# Patient Record
Sex: FEMALE | Race: BLACK OR AFRICAN AMERICAN | NOT HISPANIC OR LATINO | Employment: FULL TIME | URBAN - METROPOLITAN AREA
[De-identification: names, ages, dates, MRNs, and addresses within clinical notes are randomized per-mention and may not be internally consistent; named-entity substitution may affect disease eponyms.]

---

## 2019-05-13 ENCOUNTER — OFFICE VISIT (OUTPATIENT)
Dept: URGENT CARE | Facility: CLINIC | Age: 29
End: 2019-05-13
Payer: COMMERCIAL

## 2019-05-13 VITALS
TEMPERATURE: 98 F | OXYGEN SATURATION: 97 % | RESPIRATION RATE: 19 BRPM | HEART RATE: 96 BPM | HEIGHT: 62 IN | BODY MASS INDEX: 42.33 KG/M2 | WEIGHT: 230 LBS | SYSTOLIC BLOOD PRESSURE: 114 MMHG | DIASTOLIC BLOOD PRESSURE: 79 MMHG

## 2019-05-13 DIAGNOSIS — J03.90 EXUDATIVE TONSILLITIS: Primary | ICD-10-CM

## 2019-05-13 PROCEDURE — 99214 PR OFFICE/OUTPT VISIT, EST, LEVL IV, 30-39 MIN: ICD-10-PCS | Mod: S$GLB,,, | Performed by: INTERNAL MEDICINE

## 2019-05-13 PROCEDURE — 99214 OFFICE O/P EST MOD 30 MIN: CPT | Mod: S$GLB,,, | Performed by: INTERNAL MEDICINE

## 2019-05-13 RX ORDER — AMOXICILLIN AND CLAVULANATE POTASSIUM 875; 125 MG/1; MG/1
1 TABLET, FILM COATED ORAL EVERY 12 HOURS
Qty: 20 TABLET | Refills: 0 | Status: SHIPPED | OUTPATIENT
Start: 2019-05-13 | End: 2019-05-23

## 2019-05-13 RX ORDER — LIDOCAINE HYDROCHLORIDE 20 MG/ML
SOLUTION OROPHARYNGEAL
Qty: 100 ML | Refills: 0 | Status: SHIPPED | OUTPATIENT
Start: 2019-05-13 | End: 2019-06-14

## 2019-05-13 NOTE — PROGRESS NOTES
"Subjective:       Patient ID: Jose Hernandez is a 28 y.o. female.    Vitals:  height is 5' 2" (1.575 m) and weight is 104.3 kg (230 lb). Her oral temperature is 98.4 °F (36.9 °C). Her blood pressure is 114/79 and her pulse is 96. Her respiration is 19 and oxygen saturation is 97%.     Chief Complaint: Sore Throat    Sore Throat    This is a new problem. The current episode started in the past 7 days (a week). The problem has been gradually improving. The pain is worse on the left side. There has been no fever. The pain is at a severity of 7/10. The pain is moderate. Associated symptoms include trouble swallowing. Pertinent negatives include no abdominal pain, congestion, coughing, diarrhea, drooling, ear discharge, ear pain, headaches, hoarse voice, plugged ear sensation, neck pain, shortness of breath, stridor, swollen glands or vomiting. She has had no exposure to strep or mono. Treatments tried: steroid shot (saturday) The treatment provided mild relief.       Constitution: Negative for chills, sweating, fatigue and fever.   HENT: Positive for postnasal drip, sore throat and trouble swallowing. Negative for ear pain, ear discharge, drooling, congestion, sinus pain, sinus pressure and voice change.    Neck: Negative for neck pain and painful lymph nodes.   Eyes: Negative for eye redness.   Respiratory: Negative for chest tightness, cough, sputum production, bloody sputum, COPD, shortness of breath, stridor, wheezing and asthma.    Gastrointestinal: Negative for abdominal pain, nausea, vomiting and diarrhea.   Musculoskeletal: Negative for muscle ache.   Skin: Negative for rash.   Allergic/Immunologic: Negative for seasonal allergies and asthma.   Neurological: Negative for headaches.   Hematologic/Lymphatic: Negative for swollen lymph nodes.       Objective:      Physical Exam   Constitutional: She appears well-developed and well-nourished.   HENT:   Head: Normocephalic and atraumatic.   Mouth/Throat: " Oropharyngeal exudate, posterior oropharyngeal edema and posterior oropharyngeal erythema present. Tonsils are 0 on the right. Tonsils are 2+ on the left. Tonsillar exudate.   Eyes: Pupils are equal, round, and reactive to light. Conjunctivae and EOM are normal.   Lymphadenopathy:     She has cervical adenopathy.   Nursing note and vitals reviewed.      Assessment:       1. Exudative tonsillitis        Plan:         Exudative tonsillitis  -     amoxicillin-clavulanate 875-125mg (AUGMENTIN) 875-125 mg per tablet; Take 1 tablet by mouth every 12 (twelve) hours. for 10 days  Dispense: 20 tablet; Refill: 0  -     lidocaine HCl 2% (LIDOCAINE VISCOUS) 2 % Soln; by Mucous Membrane route every 3 (three) hours. Use 1 tsp 4qhours prn throat pain  Dispense: 100 mL; Refill: 0

## 2019-05-29 ENCOUNTER — HOSPITAL ENCOUNTER (EMERGENCY)
Facility: HOSPITAL | Age: 29
Discharge: HOME | End: 2019-05-30
Attending: EMERGENCY MEDICINE
Payer: COMMERCIAL

## 2019-05-29 DIAGNOSIS — B37.31 VULVOVAGINITIS DUE TO YEAST: ICD-10-CM

## 2019-05-29 DIAGNOSIS — R05.9 COUGH: ICD-10-CM

## 2019-05-29 DIAGNOSIS — R19.7 DIARRHEA, UNSPECIFIED TYPE: Primary | ICD-10-CM

## 2019-05-29 DIAGNOSIS — J40 BRONCHITIS: ICD-10-CM

## 2019-05-29 LAB
AMPHET+METHAMPHET UR QL: NEGATIVE
B-HCG UR QL: NEGATIVE
BARBITURATES UR QL SCN>200 NG/ML: NEGATIVE
BENZODIAZ UR QL SCN>200 NG/ML: NEGATIVE
BILIRUB UR QL STRIP: NEGATIVE
BZE UR QL SCN: NEGATIVE
CANNABINOIDS UR QL SCN: NEGATIVE
CLARITY UR: CLEAR
COLOR UR: YELLOW
CREAT UR-MCNC: 37.4 MG/DL (ref 15–325)
CTP QC/QA: YES
GLUCOSE UR QL STRIP: NEGATIVE
HGB UR QL STRIP: NEGATIVE
KETONES UR QL STRIP: NEGATIVE
LEUKOCYTE ESTERASE UR QL STRIP: NEGATIVE
METHADONE UR QL SCN>300 NG/ML: NEGATIVE
NITRITE UR QL STRIP: NEGATIVE
OPIATES UR QL SCN: NEGATIVE
PCP UR QL SCN>25 NG/ML: NORMAL
PH UR STRIP: 6 [PH] (ref 5–8)
PROT UR QL STRIP: NEGATIVE
SP GR UR STRIP: <=1.005 (ref 1–1.03)
TOXICOLOGY INFORMATION: NORMAL
URN SPEC COLLECT METH UR: ABNORMAL
UROBILINOGEN UR STRIP-ACNC: NEGATIVE EU/DL

## 2019-05-29 PROCEDURE — 99284 EMERGENCY DEPT VISIT MOD MDM: CPT | Mod: 25

## 2019-05-29 PROCEDURE — 25000242 PHARM REV CODE 250 ALT 637 W/ HCPCS: Performed by: PHYSICIAN ASSISTANT

## 2019-05-29 PROCEDURE — 96360 HYDRATION IV INFUSION INIT: CPT

## 2019-05-29 PROCEDURE — 81025 URINE PREGNANCY TEST: CPT | Performed by: EMERGENCY MEDICINE

## 2019-05-29 PROCEDURE — 80307 DRUG TEST PRSMV CHEM ANLYZR: CPT

## 2019-05-29 PROCEDURE — 25000003 PHARM REV CODE 250: Performed by: PHYSICIAN ASSISTANT

## 2019-05-29 PROCEDURE — 94640 AIRWAY INHALATION TREATMENT: CPT

## 2019-05-29 PROCEDURE — 81003 URINALYSIS AUTO W/O SCOPE: CPT | Mod: 59

## 2019-05-29 RX ORDER — IPRATROPIUM BROMIDE AND ALBUTEROL SULFATE 2.5; .5 MG/3ML; MG/3ML
3 SOLUTION RESPIRATORY (INHALATION)
Status: COMPLETED | OUTPATIENT
Start: 2019-05-29 | End: 2019-05-29

## 2019-05-29 RX ORDER — GUAIFENESIN/DEXTROMETHORPHAN 100-10MG/5
10 SYRUP ORAL
Status: COMPLETED | OUTPATIENT
Start: 2019-05-29 | End: 2019-05-29

## 2019-05-29 RX ORDER — IPRATROPIUM BROMIDE AND ALBUTEROL SULFATE 2.5; .5 MG/3ML; MG/3ML
3 SOLUTION RESPIRATORY (INHALATION)
Status: DISCONTINUED | OUTPATIENT
Start: 2019-05-29 | End: 2019-05-29

## 2019-05-29 RX ADMIN — GUAIFENESIN AND DEXTROMETHORPHAN 10 ML: 100; 10 SYRUP ORAL at 11:05

## 2019-05-29 RX ADMIN — SODIUM CHLORIDE 1000 ML: 0.9 INJECTION, SOLUTION INTRAVENOUS at 11:05

## 2019-05-29 RX ADMIN — IPRATROPIUM BROMIDE AND ALBUTEROL SULFATE 3 ML: .5; 3 SOLUTION RESPIRATORY (INHALATION) at 11:05

## 2019-05-30 VITALS
WEIGHT: 230 LBS | SYSTOLIC BLOOD PRESSURE: 147 MMHG | HEIGHT: 62 IN | RESPIRATION RATE: 18 BRPM | BODY MASS INDEX: 42.33 KG/M2 | HEART RATE: 83 BPM | DIASTOLIC BLOOD PRESSURE: 98 MMHG | TEMPERATURE: 98 F | OXYGEN SATURATION: 98 %

## 2019-05-30 PROCEDURE — 25000003 PHARM REV CODE 250: Performed by: PHYSICIAN ASSISTANT

## 2019-05-30 RX ORDER — ALBUTEROL SULFATE 2.5 MG/.5ML
2.5 SOLUTION RESPIRATORY (INHALATION) EVERY 6 HOURS PRN
Qty: 20 EACH | Refills: 0 | Status: SHIPPED | OUTPATIENT
Start: 2019-05-30 | End: 2019-06-14

## 2019-05-30 RX ORDER — BENZONATATE 100 MG/1
100 CAPSULE ORAL 3 TIMES DAILY PRN
Qty: 20 CAPSULE | Refills: 0 | Status: SHIPPED | OUTPATIENT
Start: 2019-05-30 | End: 2019-06-09

## 2019-05-30 RX ORDER — FLUCONAZOLE 200 MG/1
200 TABLET ORAL
Status: COMPLETED | OUTPATIENT
Start: 2019-05-30 | End: 2019-05-30

## 2019-05-30 RX ADMIN — FLUCONAZOLE 200 MG: 100 TABLET ORAL at 12:05

## 2019-05-30 NOTE — ED TRIAGE NOTES
"Pt presents to ED with complaints of cough x 3 weeks, nausea & vomiting x,1 week accompanied with "excessive coughing", sore throat, diarrhea x 3 days about 3-4 times a day; painful urination and "soreness and swelling in vaginal area". Pt has taken imodium with no relief. Pt states she has been on antibiotics for the past couple of moths due to UTI and for sore throat/ "swollen adenoids". Pt denies vaginal discharge. Pt has been using cough drops, but no cough medicine.   "

## 2019-05-30 NOTE — DISCHARGE INSTRUCTIONS
If tessalon perles do not control cough, take Delsym. Use nebulizer treatments twice a day or every 6 hours as needed. Hydrate well with water and pedialyte. Begin taking probiotics. Follow up with PCP as soon as possible. Return to the ER with any new or worsening symptoms including abdominal pain or fever.

## 2019-05-30 NOTE — ED PROVIDER NOTES
Encounter Date: 5/29/2019       History     Chief Complaint   Patient presents with    Vomiting     Pt. c/o cough with vomiting and diarrhea. Pt. reports cough for 2 months and has been on antibiotics on and off for an URI and for a tooth removal. Reports dysuria, denies vaginal complaints.     29 yo female with no PMH presents to the ED with complaints of cough x 3 weeks with post tussive vomiting. Glenn complains of diarrhea x 3 days and vaginal irritation and itching. Patient states that she had a cold about 1 month ago and the cough has persisted since that time. She stopped taking cough medication after the cold resolved and has only been using throat lozenges. She states she has cough spells so intense she vomits. She states that diarrhea began about 3 days ago, occurs 3-4 times daily, and is watery with some solid stool in consistency. Patient has been on and off antibiotics for about 1 month for varies reasons including pre and post dental procedure and exudative tonsillitis. She also began having vaginal itching and irritation about 3 days ago. She admits to increased water intake due to cough and increased urination. Denies fever, congestion, sputum production, wheezing, chest pain, abdominal pain, vaginal discharge.     The history is provided by the patient. No  was used.     Review of patient's allergies indicates:  No Known Allergies  History reviewed. No pertinent past medical history.  History reviewed. No pertinent surgical history.  History reviewed. No pertinent family history.  Social History     Tobacco Use    Smoking status: Current Every Day Smoker    Smokeless tobacco: Never Used   Substance Use Topics    Alcohol use: Not on file    Drug use: Not on file     Review of Systems   Constitutional: Negative for chills and fever.   HENT: Positive for sore throat. Negative for congestion and ear pain.    Respiratory: Positive for cough. Negative for wheezing.     Cardiovascular: Negative for chest pain.   Gastrointestinal: Positive for diarrhea and vomiting (post tussive). Negative for abdominal pain and nausea.   Genitourinary: Positive for dysuria, frequency (increased water intake) and vaginal pain (itching and irritation). Negative for flank pain, genital sores, hematuria and vaginal discharge.   All other systems reviewed and are negative.      Physical Exam     Initial Vitals [05/29/19 2126]   BP Pulse Resp Temp SpO2   (!) 148/106 101 18 98.5 °F (36.9 °C) 97 %      MAP       --         Physical Exam    Nursing note and vitals reviewed.  Constitutional: Vital signs are normal. She appears well-developed and well-nourished. No distress.   HENT:   Head: Normocephalic and atraumatic.   Nose: Nose normal.   Mouth/Throat: Uvula is midline, oropharynx is clear and moist and mucous membranes are normal.   Eyes: Conjunctivae and lids are normal.   Neck: Normal range of motion and phonation normal. Neck supple.   Cardiovascular: Normal rate, regular rhythm and normal heart sounds.   Pulmonary/Chest: Effort normal and breath sounds normal. She has no decreased breath sounds. She has no wheezes. She has no rales.   Dry cough noted.   Abdominal: Soft. Normal appearance and bowel sounds are normal. There is no tenderness. There is no rigidity, no rebound and no guarding.   Genitourinary: Pelvic exam was performed with patient supine. There is no rash, lesion or injury on the right labia. There is no rash, lesion or injury on the left labia. Vaginal discharge (scant white) found.   Musculoskeletal: Normal range of motion.   Neurological: She is alert and oriented to person, place, and time.   Skin: Skin is warm and dry.   Psychiatric: She has a normal mood and affect. Her speech is normal and behavior is normal. Judgment and thought content normal. Cognition and memory are normal.         ED Course   Procedures  Labs Reviewed   URINALYSIS, REFLEX TO URINE CULTURE - Abnormal;  Notable for the following components:       Result Value    Specific Gravity, UA <=1.005 (*)     All other components within normal limits    Narrative:     Preferred Collection Type->Urine, Clean Catch   DRUG SCREEN PANEL, URINE EMERGENCY    Narrative:     Preferred Collection Type->Urine, Clean Catch   POCT URINE PREGNANCY          Imaging Results          X-Ray Chest PA And Lateral (Final result)  Result time 05/29/19 23:46:13    Final result by Aditya Hernandez MD (05/29/19 23:46:13)                 Impression:      No acute or active disease.      Electronically signed by: Aditya Hernandez  Date:    05/29/2019  Time:    23:46             Narrative:    EXAMINATION:  XR CHEST PA AND LATERAL    CLINICAL HISTORY:  Cough    TECHNIQUE:  PA and lateral views of the chest were performed.    COMPARISON:  None    FINDINGS:  Frontal lateral views presented.  Heart and lungs are normal.  No pneumothorax or pleural effusion or nodule or abdominal free air or fracture.  The hilar shadows and trachea appear normal.  Visualized spine is intact.  There is transverse film artifact superiorly.                                 Medical Decision Making:   Initial Assessment:   29 yo female with cough x 3 weeks with post tussive vomiting and sore throat. 3 days of diarrhea and vaginal itching and irritation   Differential Diagnosis:   Bronchitis, vaginal yeast infection, diarrhea secondary to antibiotic use, gastroenteritis, urethritis, UTI, pneumonia  Clinical Tests:   Lab Tests: Ordered and Reviewed  Radiological Study: Ordered and Reviewed  ED Management:  X-ray negative. UA negative. IVF, Duoneb and robitussin given and patient state some relief of symptoms. Diflucan given in Ed for probable vaginal yeast infection. Patient did not have a bowel movement while in ED. No vomiting while in ED. She will be discharged with rx for nebulized albuterol and tessalon perles and instructions to also take probiotics and hydrate well with water  and Pedialyte.  Patient is to follow up with PCP in 2-3 days.  Return precautions given.  Patient states understanding and agreement with treatment plan.  Other:   I have discussed this case with another health care provider.              Attending Attestation:     Physician Attestation Statement for NP/PA:   I discussed this assessment and plan of this patient with the NP/PA, but I did not personally examine the patient. The face to face encounter was performed by the NP/PA.                     Clinical Impression:       ICD-10-CM ICD-9-CM   1. Diarrhea, unspecified type R19.7 787.91   2. Cough R05 786.2   3. Bronchitis J40 490   4. Vulvovaginitis due to yeast B37.3 112.1                                Jennifer Lott PA-C  05/30/19 0102       Farrukh Aguilar MD  05/30/19 0228

## 2019-06-14 ENCOUNTER — OFFICE VISIT (OUTPATIENT)
Dept: OBSTETRICS AND GYNECOLOGY | Facility: CLINIC | Age: 29
End: 2019-06-14
Payer: COMMERCIAL

## 2019-06-14 ENCOUNTER — LAB VISIT (OUTPATIENT)
Dept: LAB | Facility: HOSPITAL | Age: 29
End: 2019-06-14
Attending: OBSTETRICS & GYNECOLOGY
Payer: COMMERCIAL

## 2019-06-14 VITALS
DIASTOLIC BLOOD PRESSURE: 80 MMHG | WEIGHT: 227.31 LBS | HEIGHT: 62 IN | BODY MASS INDEX: 41.83 KG/M2 | SYSTOLIC BLOOD PRESSURE: 110 MMHG

## 2019-06-14 DIAGNOSIS — Z11.3 SCREENING EXAMINATION FOR STD (SEXUALLY TRANSMITTED DISEASE): ICD-10-CM

## 2019-06-14 DIAGNOSIS — Z01.419 ENCOUNTER FOR ANNUAL ROUTINE GYNECOLOGICAL EXAMINATION: Primary | ICD-10-CM

## 2019-06-14 DIAGNOSIS — Z01.419 ENCOUNTER FOR ANNUAL ROUTINE GYNECOLOGICAL EXAMINATION: ICD-10-CM

## 2019-06-14 DIAGNOSIS — Z12.4 PAP SMEAR FOR CERVICAL CANCER SCREENING: ICD-10-CM

## 2019-06-14 PROCEDURE — 99385 PR PREVENTIVE VISIT,NEW,18-39: ICD-10-PCS | Mod: S$GLB,,, | Performed by: OBSTETRICS & GYNECOLOGY

## 2019-06-14 PROCEDURE — 88141 CYTOPATH C/V INTERPRET: CPT | Mod: ,,, | Performed by: PATHOLOGY

## 2019-06-14 PROCEDURE — 87510 GARDNER VAG DNA DIR PROBE: CPT

## 2019-06-14 PROCEDURE — 86592 SYPHILIS TEST NON-TREP QUAL: CPT

## 2019-06-14 PROCEDURE — 99999 PR PBB SHADOW E&M-EST. PATIENT-LVL II: ICD-10-PCS | Mod: PBBFAC,,, | Performed by: OBSTETRICS & GYNECOLOGY

## 2019-06-14 PROCEDURE — 88141 LIQUID-BASED PAP SMEAR, SCREENING: ICD-10-PCS | Mod: ,,, | Performed by: PATHOLOGY

## 2019-06-14 PROCEDURE — 86703 HIV-1/HIV-2 1 RESULT ANTBDY: CPT

## 2019-06-14 PROCEDURE — 80074 ACUTE HEPATITIS PANEL: CPT

## 2019-06-14 PROCEDURE — 87480 CANDIDA DNA DIR PROBE: CPT

## 2019-06-14 PROCEDURE — 36415 COLL VENOUS BLD VENIPUNCTURE: CPT

## 2019-06-14 PROCEDURE — 87491 CHLMYD TRACH DNA AMP PROBE: CPT

## 2019-06-14 PROCEDURE — 87624 HPV HI-RISK TYP POOLED RSLT: CPT

## 2019-06-14 PROCEDURE — 99385 PREV VISIT NEW AGE 18-39: CPT | Mod: S$GLB,,, | Performed by: OBSTETRICS & GYNECOLOGY

## 2019-06-14 PROCEDURE — 88175 CYTOPATH C/V AUTO FLUID REDO: CPT | Performed by: PATHOLOGY

## 2019-06-14 PROCEDURE — 99999 PR PBB SHADOW E&M-EST. PATIENT-LVL II: CPT | Mod: PBBFAC,,, | Performed by: OBSTETRICS & GYNECOLOGY

## 2019-06-14 NOTE — PROGRESS NOTES
Chief Complaint   Patient presents with    Well Woman       HISTORY OF PRESENT ILLNESS:   Jose Hernandez is a 28 y.o. female  who presents for well woman exam.  Patient's last menstrual period was 06/10/2019..  She has no complaints.  Cycles are regular. Desires STD testing. Desires declines for birth control.      History reviewed. No pertinent past medical history.     History reviewed. No pertinent surgical history.      Social History     Socioeconomic History    Marital status: Single     Spouse name: Not on file    Number of children: Not on file    Years of education: Not on file    Highest education level: Not on file   Occupational History    Not on file   Social Needs    Financial resource strain: Not on file    Food insecurity:     Worry: Not on file     Inability: Not on file    Transportation needs:     Medical: Not on file     Non-medical: Not on file   Tobacco Use    Smoking status: Current Every Day Smoker    Smokeless tobacco: Never Used   Substance and Sexual Activity    Alcohol use: Yes    Drug use: Never    Sexual activity: Yes     Partners: Male   Lifestyle    Physical activity:     Days per week: Not on file     Minutes per session: Not on file    Stress: Not on file   Relationships    Social connections:     Talks on phone: Not on file     Gets together: Not on file     Attends Pentecostalism service: Not on file     Active member of club or organization: Not on file     Attends meetings of clubs or organizations: Not on file     Relationship status: Not on file   Other Topics Concern    Not on file   Social History Narrative    Not on file       History reviewed. No pertinent family history.      OB History    Para Term  AB Living   2 2 1 1   2   SAB TAB Ectopic Multiple Live Births                  # Outcome Date GA Lbr Kev/2nd Weight Sex Delivery Anes PTL Lv   2       CS-Unspec      1 Term      CS-Unspec            COMPREHENSIVE GYN HISTORY:  PAP  "History: reports has abnormal pap smear  Infection History: Denies STDs. Denies PID.  Benign History:Denies uterine fibroids. Denies ovarian cysts. Denies endometriosis Denies other conditions. Was told she has polycystic appearing ovaries on US   Cancer History: Denies cervical cancer. Denies uterine cancer or hyperplasia. Denies ovarian cancer. Denies vulvar cancer or pre-cancer. Denies vaginal cancer or pre-cancer. Denies breast cancer. Denies colon cancer.  Cycle: 11/mon/3d  No contraception    ROS:  GENERAL: Denies weight gain or weight loss. Feeling well overall.   SKIN: Denies rash or lesions.   HEAD: Denies headache.   NODES: Denies enlarged lymph nodes.   CHEST: Denies shortness of breath.   ABDOMEN: No abdominal pain, constipation, diarrhea, nausea, vomiting or rectal bleeding.   URINARY: No frequency, dysuria, hematuria, or burning on urination.  REPRODUCTIVE: See HPI.   BREASTS: The patient denies pain, lumps, or nipple discharge.       /80   Ht 5' 2" (1.575 m)   Wt 103.1 kg (227 lb 4.7 oz)   LMP 06/10/2019   BMI 41.57 kg/m²     APPEARANCE: Well nourished, well developed, in no acute distress.  NECK: Neck symmetric without  thyromegaly.  NODES: No inguinal, cervical lymph node enlargement.  CHEST: Lungs clear to auscultation.  HEART: Regular rate and rhythm, no murmurs, rubs or gallops.  ABDOMEN: Soft. No tenderness or masses. No hernias. No hepatosplenomegaly.  BREASTS: Symmetrical, no skin changes or visible lesions. No palpable masses, nipple discharge or adenopathy bilaterally.  PELVIC:   VULVA: No lesions. Normal female genitalia.  URETHRAL MEATUS: Normal size and location, no lesions, no prolapse.  URETHRA: No masses, tenderness, prolapse or scarring.  VAGINA: Moist and well rugated, no discharge, no significant cystocele or rectocele.  CERVIX: No lesions and discharge.  UTERUS: Normal size, regular shape, mobile, non-tender, bladder base nontender.  ADNEXA: No masses or " tenderness.      1. Encounter for annual routine gynecological examination    2. Pap smear for cervical cancer screening    3. Screening examination for STD (sexually transmitted disease)        Plan:  Routine gyn s/p normal breast exam. Pap With HPV cotesting ordered . STD testing: GC/CT/trich, syphilis, HBV/HCV and HIV ordered. Counseled on contraception and declines    F/u in 1 yr or PRN

## 2019-06-15 LAB
C TRACH DNA SPEC QL NAA+PROBE: NOT DETECTED
N GONORRHOEA DNA SPEC QL NAA+PROBE: NOT DETECTED
RPR SER QL: NORMAL

## 2019-06-17 ENCOUNTER — TELEPHONE (OUTPATIENT)
Dept: OBSTETRICS AND GYNECOLOGY | Facility: CLINIC | Age: 29
End: 2019-06-17

## 2019-06-17 LAB
BACTERIAL VAGINOSIS DNA: POSITIVE
CANDIDA GLABRATA DNA: NEGATIVE
CANDIDA KRUSEI DNA: NEGATIVE
CANDIDA RRNA VAG QL PROBE: NEGATIVE
HAV IGM SERPL QL IA: NEGATIVE
HBV CORE IGM SERPL QL IA: NEGATIVE
HBV SURFACE AG SERPL QL IA: NEGATIVE
HBV SURFACE AG SERPL QL IA: NEGATIVE
HCV AB SERPL QL IA: NEGATIVE
HIV 1+2 AB+HIV1 P24 AG SERPL QL IA: NEGATIVE
T VAGINALIS RRNA GENITAL QL PROBE: NEGATIVE

## 2019-06-17 RX ORDER — METRONIDAZOLE 500 MG/1
500 TABLET ORAL EVERY 12 HOURS
Qty: 14 TABLET | Refills: 0 | Status: SHIPPED | OUTPATIENT
Start: 2019-06-17 | End: 2019-06-24

## 2019-06-17 NOTE — TELEPHONE ENCOUNTER
Please let patient know that her STD screening including her HIV, syphilis, Hepatitis, GC/CT/trich was negative. But she tested + for a BV infection. This is not an STD but is a change in the normal bacterial sugar in the vagina that can cause a discharge and an odor. I sent flagyl in to the pharmacy for her to take twice a day for 7 days. Please don't drink while taking the medication. It may give you a bad taste in your mouth or nausea, this is not an allergic reaction just a side effect of the medication. If it is intolerable we can call in a vaginal gel which can treat it but it can be more expensive depending on your insurance. Just let us know.  We are still waiting on the results of her pap smear.  Thanks.

## 2019-06-17 NOTE — TELEPHONE ENCOUNTER
Patient informed the hiv, syphilis, hepatitis, gc/ct and trich was negative  Vaginal swab positive for bv  Explained bv  Patient verbalized understanding  rx sent to the pharmacy

## 2019-06-20 LAB
HPV HR 12 DNA CVX QL NAA+PROBE: NEGATIVE
HPV16 AG SPEC QL: NEGATIVE
HPV18 DNA SPEC QL NAA+PROBE: NEGATIVE

## 2019-12-06 ENCOUNTER — OFFICE VISIT (OUTPATIENT)
Dept: OBSTETRICS AND GYNECOLOGY | Facility: CLINIC | Age: 29
End: 2019-12-06
Payer: MEDICAID

## 2019-12-06 VITALS
HEIGHT: 62 IN | BODY MASS INDEX: 42.96 KG/M2 | DIASTOLIC BLOOD PRESSURE: 80 MMHG | WEIGHT: 233.44 LBS | SYSTOLIC BLOOD PRESSURE: 110 MMHG

## 2019-12-06 DIAGNOSIS — N91.5 OLIGOMENORRHEA, UNSPECIFIED TYPE: Primary | ICD-10-CM

## 2019-12-06 DIAGNOSIS — E28.2 PCOS (POLYCYSTIC OVARIAN SYNDROME): ICD-10-CM

## 2019-12-06 PROCEDURE — 99999 PR PBB SHADOW E&M-EST. PATIENT-LVL III: ICD-10-PCS | Mod: PBBFAC,,, | Performed by: OBSTETRICS & GYNECOLOGY

## 2019-12-06 PROCEDURE — 99213 OFFICE O/P EST LOW 20 MIN: CPT | Mod: S$PBB,,, | Performed by: OBSTETRICS & GYNECOLOGY

## 2019-12-06 PROCEDURE — 99213 OFFICE O/P EST LOW 20 MIN: CPT | Mod: PBBFAC,PO | Performed by: OBSTETRICS & GYNECOLOGY

## 2019-12-06 PROCEDURE — 99213 PR OFFICE/OUTPT VISIT, EST, LEVL III, 20-29 MIN: ICD-10-PCS | Mod: S$PBB,,, | Performed by: OBSTETRICS & GYNECOLOGY

## 2019-12-06 PROCEDURE — 99999 PR PBB SHADOW E&M-EST. PATIENT-LVL III: CPT | Mod: PBBFAC,,, | Performed by: OBSTETRICS & GYNECOLOGY

## 2019-12-06 RX ORDER — ERGOCALCIFEROL 1.25 MG/1
CAPSULE ORAL
COMMUNITY
Start: 2019-12-04 | End: 2022-05-30

## 2019-12-06 RX ORDER — MEDROXYPROGESTERONE ACETATE 10 MG/1
TABLET ORAL
Qty: 30 TABLET | Refills: 3 | Status: SHIPPED | OUTPATIENT
Start: 2019-12-06 | End: 2020-01-05 | Stop reason: CLARIF

## 2019-12-06 NOTE — PROGRESS NOTES
Chief Complaint   Patient presents with    Consult       HISTORY OF PRESENT ILLNESS:   Jose Hernandez is a 28 y.o. female  who presents for AUB-O.  No LMP recorded..  She reports she was having monthly cycles cycles until September and she hasn't seen one since. Her PCP ordered a bunch of random hormones level and her testosterone was mildly elevated at 98 so she was told to come here to f/u. She was diagnosed with PCOS as a teen but got pregnant and had regular cycles after that. Reports not sexually active and took a UPT that was negative. Denies galactorrhea. Has gained weight over the past year.      History reviewed. No pertinent past medical history.     History reviewed. No pertinent surgical history.      Social History     Socioeconomic History    Marital status: Single     Spouse name: Not on file    Number of children: Not on file    Years of education: Not on file    Highest education level: Not on file   Occupational History    Not on file   Social Needs    Financial resource strain: Not on file    Food insecurity:     Worry: Not on file     Inability: Not on file    Transportation needs:     Medical: Not on file     Non-medical: Not on file   Tobacco Use    Smoking status: Current Every Day Smoker    Smokeless tobacco: Never Used   Substance and Sexual Activity    Alcohol use: Yes    Drug use: Never    Sexual activity: Yes     Partners: Male   Lifestyle    Physical activity:     Days per week: Not on file     Minutes per session: Not on file    Stress: Not on file   Relationships    Social connections:     Talks on phone: Not on file     Gets together: Not on file     Attends Hinduism service: Not on file     Active member of club or organization: Not on file     Attends meetings of clubs or organizations: Not on file     Relationship status: Not on file   Other Topics Concern    Not on file   Social History Narrative    Not on file       History reviewed. No pertinent  "family history.      OB History    Para Term  AB Living   2 2 1 1   2   SAB TAB Ectopic Multiple Live Births                  # Outcome Date GA Lbr Kev/2nd Weight Sex Delivery Anes PTL Lv   2       CS-Unspec      1 Term      CS-Unspec            COMPREHENSIVE GYN HISTORY:  PAP History: reports has abnormal pap smear  Infection History: Denies STDs. Denies PID.  Benign History:Denies uterine fibroids. Denies ovarian cysts. Denies endometriosis Denies other conditions. Was told she has polycystic appearing ovaries on US   Cancer History: Denies cervical cancer. Denies uterine cancer or hyperplasia. Denies ovarian cancer. Denies vulvar cancer or pre-cancer. Denies vaginal cancer or pre-cancer. Denies breast cancer. Denies colon cancer.  Cycle:   No contraception    ROS:  GENERAL: Denies weight gain or weight loss. Feeling well overall.   SKIN: Denies rash or lesions.   HEAD: Denies headache.   NODES: Denies enlarged lymph nodes.   CHEST: Denies shortness of breath.   ABDOMEN: No abdominal pain, constipation, diarrhea, nausea, vomiting or rectal bleeding.   URINARY: No frequency, dysuria, hematuria, or burning on urination.  REPRODUCTIVE: See HPI.   BREASTS: The patient denies pain, lumps, or nipple discharge.       /80   Ht 5' 2" (1.575 m)   Wt 105.9 kg (233 lb 7.5 oz)   BMI 42.70 kg/m²     APPEARANCE: Well nourished, well developed, in no acute distress.    PELVIC:   VULVA: No lesions. Normal female genitalia.  URETHRAL MEATUS: Normal size and location, no lesions, no prolapse.  URETHRA: No masses, tenderness, prolapse or scarring.  VAGINA: Moist and well rugated, no discharge, no significant cystocele or rectocele.  CERVIX: No lesions and discharge.  UTERUS: Normal size, regular shape, mobile, non-tender, bladder base nontender.  ADNEXA: No masses or tenderness.    TSH was normal     1. Oligomenorrhea, unspecified type    2. PCOS (polycystic ovarian syndrome)        Plan:  1. " Discussed anovulation and that it is not normal to go months without a cycle if you are not on birth control. Discussed the risk of endometrial hyperplasia/cancer with long term anovulatory bleeding and oligomenorrhea and recommend provera or birth control to help with cycle regulation and to thin the lining of the uterus. Will get prolactin and US. Likely she does have PCOS. PCP tested cholesterol and BS which were elevated so she is starting diet and exercise and we discussed that weight loss will also help this where she may ovulate on her own and her cycles may start again but if not then we should do cyclic provera if she doesn't want to do birth control.       F/u in 1 yr or PRN

## 2019-12-18 ENCOUNTER — HOSPITAL ENCOUNTER (OUTPATIENT)
Dept: RADIOLOGY | Facility: HOSPITAL | Age: 29
Discharge: HOME | End: 2019-12-18
Attending: OBSTETRICS & GYNECOLOGY
Payer: MEDICAID

## 2019-12-18 DIAGNOSIS — E28.2 PCOS (POLYCYSTIC OVARIAN SYNDROME): ICD-10-CM

## 2019-12-18 DIAGNOSIS — N91.5 OLIGOMENORRHEA, UNSPECIFIED TYPE: ICD-10-CM

## 2019-12-18 PROCEDURE — 76830 TRANSVAGINAL US NON-OB: CPT | Mod: 26,,, | Performed by: RADIOLOGY

## 2019-12-18 PROCEDURE — 76856 US PELVIS COMP WITH TRANSVAG NON-OB (XPD): ICD-10-PCS | Mod: 26,,, | Performed by: RADIOLOGY

## 2019-12-18 PROCEDURE — 76856 US EXAM PELVIC COMPLETE: CPT | Mod: 26,,, | Performed by: RADIOLOGY

## 2019-12-18 PROCEDURE — 76830 US PELVIS COMP WITH TRANSVAG NON-OB (XPD): ICD-10-PCS | Mod: 26,,, | Performed by: RADIOLOGY

## 2019-12-18 PROCEDURE — 76830 TRANSVAGINAL US NON-OB: CPT | Mod: TC

## 2019-12-19 ENCOUNTER — TELEPHONE (OUTPATIENT)
Dept: OBSTETRICS AND GYNECOLOGY | Facility: HOSPITAL | Age: 29
End: 2019-12-19

## 2019-12-19 RX ORDER — MEDROXYPROGESTERONE ACETATE 10 MG/1
TABLET ORAL
Qty: 30 TABLET | Refills: 3 | Status: SHIPPED | OUTPATIENT
Start: 2019-12-19 | End: 2020-01-05 | Stop reason: CLARIF

## 2019-12-19 NOTE — TELEPHONE ENCOUNTER
Please let her know that her prolactin looks normal and her ultrasound looks normal. The ovaries are not obviously polycystic though that doesn't mean that she doesn't have PCOS. I would recommend doing provera 10mg for 10 days out the month until her cycles start to come monthly. I sent the medications to her pharmacy. The lining of the uterus is thickened so her first cycle will likely be heavy but that is ok because we want the lining to come out and not stay there.     Thanks

## 2019-12-19 NOTE — TELEPHONE ENCOUNTER
----- Message from Tammy Martinez MD sent at 12/19/2019  7:02 AM CST -----  Can you please see my message that I accidentally signed about the US and prolactin and how to take the provera and relay the messaget to her. Thanks

## 2019-12-19 NOTE — TELEPHONE ENCOUNTER
Called to inform pt of results. Pt verbalized understanding.       Please let her know that her prolactin looks normal and her ultrasound looks normal. The ovaries are not obviously polycystic though that doesn't mean that she doesn't have PCOS. I would recommend doing provera 10mg for 10 days out the month until her cycles start to come monthly. I sent the medications to her pharmacy. The lining of the uterus is thickened so her first cycle will likely be heavy but that is ok because we want the lining to come out and not stay there.

## 2019-12-19 NOTE — PROGRESS NOTES
Can you please see my message that I accidentally signed about the US and prolactin and how to take the provera and relay the messaget to her. Thanks

## 2020-01-03 ENCOUNTER — TELEPHONE (OUTPATIENT)
Dept: OBSTETRICS AND GYNECOLOGY | Facility: CLINIC | Age: 30
End: 2020-01-03

## 2020-01-05 ENCOUNTER — HOSPITAL ENCOUNTER (EMERGENCY)
Facility: HOSPITAL | Age: 30
Discharge: HOME | End: 2020-01-05
Attending: EMERGENCY MEDICINE
Payer: MEDICAID

## 2020-01-05 VITALS
WEIGHT: 293 LBS | HEIGHT: 63 IN | SYSTOLIC BLOOD PRESSURE: 127 MMHG | BODY MASS INDEX: 51.91 KG/M2 | RESPIRATION RATE: 18 BRPM | DIASTOLIC BLOOD PRESSURE: 83 MMHG | TEMPERATURE: 98 F | HEART RATE: 106 BPM | OXYGEN SATURATION: 98 %

## 2020-01-05 DIAGNOSIS — N93.9 VAGINAL BLEEDING: Primary | ICD-10-CM

## 2020-01-05 LAB
B-HCG UR QL: NEGATIVE
CTP QC/QA: YES

## 2020-01-05 PROCEDURE — 63600175 PHARM REV CODE 636 W HCPCS: Performed by: EMERGENCY MEDICINE

## 2020-01-05 PROCEDURE — 96372 THER/PROPH/DIAG INJ SC/IM: CPT

## 2020-01-05 PROCEDURE — 81025 URINE PREGNANCY TEST: CPT | Performed by: EMERGENCY MEDICINE

## 2020-01-05 PROCEDURE — 99284 EMERGENCY DEPT VISIT MOD MDM: CPT | Mod: 25

## 2020-01-05 RX ORDER — KETOROLAC TROMETHAMINE 30 MG/ML
60 INJECTION, SOLUTION INTRAMUSCULAR; INTRAVENOUS
Status: COMPLETED | OUTPATIENT
Start: 2020-01-05 | End: 2020-01-05

## 2020-01-05 RX ADMIN — KETOROLAC TROMETHAMINE 60 MG: 30 INJECTION, SOLUTION INTRAMUSCULAR at 01:01

## 2020-01-05 NOTE — ED PROVIDER NOTES
Encounter Date: 1/5/2020       History     Chief Complaint   Patient presents with    Female  Problem     Patient states she had no menstrual cycle for 3 months and started having heavy period today.  Patient states she was not pregnant.     Patient is a 29-year-old female who says she began with heavy vaginal bleeding this morning.  Patient had not had a menstrual cycle for the past 3 months, but was regular prior to that.  She also experienced pelvic cramping this morning.  No lightheadedness or weakness. Patient says she had a lot of lab work done last month which was normal. She has no dysuria.  No lower back pain. No nausea or vomiting.    The history is provided by the patient.     Review of patient's allergies indicates:  No Known Allergies  History reviewed. No pertinent past medical history.  History reviewed. No pertinent surgical history.  History reviewed. No pertinent family history.  Social History     Tobacco Use    Smoking status: Current Every Day Smoker    Smokeless tobacco: Never Used   Substance Use Topics    Alcohol use: Yes    Drug use: Never     Review of Systems   Constitutional: Negative for fever.   Gastrointestinal: Negative for nausea and vomiting.   Genitourinary: Positive for vaginal bleeding and vaginal pain. Negative for dysuria.   Neurological: Negative for dizziness, weakness and light-headedness.   All other systems reviewed and are negative.      Physical Exam     Initial Vitals [01/05/20 1302]   BP Pulse Resp Temp SpO2   128/85 104 16 98.8 °F (37.1 °C) 97 %      MAP       --         Physical Exam    Nursing note and vitals reviewed.  Constitutional: No distress.   HENT:   Head: Atraumatic.   Eyes: Conjunctivae and EOM are normal.   Neck: Neck supple.   Cardiovascular: Normal rate, regular rhythm and normal heart sounds.   Pulmonary/Chest: Breath sounds normal.   Abdominal: Soft. There is no tenderness.   Genitourinary:   Genitourinary Comments: No vulvar or vaginal  lesions.  Vaginal vault with scant blood.  No discharge.   Musculoskeletal: Normal range of motion.   Neurological: She is alert and oriented to person, place, and time.   Skin: Skin is warm and dry.         ED Course   Procedures  Labs Reviewed   POCT URINE PREGNANCY          Imaging Results    None          Medical Decision Making:   ED Management:  29-year-old female who began with vaginal bleeding this morning after going 3 months with no menses.  UPT is negative. Patient had blood work done last month Clinic a hormone panel which I have reviewed.  She was given a shot of Toradol here in the ED for pelvic cramping which totally relieved her symptoms.  I have suggested she take ibuprofen for cramping and follow up with her physician as needed.                                 Clinical Impression:       ICD-10-CM ICD-9-CM   1. Vaginal bleeding N93.9 623.8                           Miguel Ferguson MD  01/05/20 1602

## 2020-01-06 ENCOUNTER — HOSPITAL ENCOUNTER (EMERGENCY)
Facility: HOSPITAL | Age: 30
Discharge: HOME | End: 2020-01-06
Attending: EMERGENCY MEDICINE
Payer: MEDICAID

## 2020-01-06 ENCOUNTER — NURSE TRIAGE (OUTPATIENT)
Dept: ADMINISTRATIVE | Facility: CLINIC | Age: 30
End: 2020-01-06

## 2020-01-06 VITALS
OXYGEN SATURATION: 100 % | HEART RATE: 78 BPM | RESPIRATION RATE: 25 BRPM | BODY MASS INDEX: 42.33 KG/M2 | WEIGHT: 230 LBS | HEIGHT: 62 IN | DIASTOLIC BLOOD PRESSURE: 84 MMHG | SYSTOLIC BLOOD PRESSURE: 131 MMHG | TEMPERATURE: 99 F

## 2020-01-06 DIAGNOSIS — N93.9 VAGINAL BLEEDING: Primary | ICD-10-CM

## 2020-01-06 LAB
ALBUMIN SERPL BCP-MCNC: 3.7 G/DL (ref 3.5–5.2)
ALP SERPL-CCNC: 70 U/L (ref 55–135)
ALT SERPL W/O P-5'-P-CCNC: 51 U/L (ref 10–44)
ANION GAP SERPL CALC-SCNC: 9 MMOL/L (ref 8–16)
AST SERPL-CCNC: 39 U/L (ref 10–40)
BASOPHILS # BLD AUTO: 0.01 K/UL (ref 0–0.2)
BASOPHILS NFR BLD: 0.1 % (ref 0–1.9)
BILIRUB SERPL-MCNC: 0.3 MG/DL (ref 0.1–1)
BUN SERPL-MCNC: 5 MG/DL (ref 6–20)
CALCIUM SERPL-MCNC: 8.9 MG/DL (ref 8.7–10.5)
CHLORIDE SERPL-SCNC: 106 MMOL/L (ref 95–110)
CO2 SERPL-SCNC: 23 MMOL/L (ref 23–29)
CREAT SERPL-MCNC: 0.8 MG/DL (ref 0.5–1.4)
DIFFERENTIAL METHOD: ABNORMAL
EOSINOPHIL # BLD AUTO: 0.3 K/UL (ref 0–0.5)
EOSINOPHIL NFR BLD: 3.7 % (ref 0–8)
ERYTHROCYTE [DISTWIDTH] IN BLOOD BY AUTOMATED COUNT: 15.2 % (ref 11.5–14.5)
EST. GFR  (AFRICAN AMERICAN): >60 ML/MIN/1.73 M^2
EST. GFR  (NON AFRICAN AMERICAN): >60 ML/MIN/1.73 M^2
GLUCOSE SERPL-MCNC: 101 MG/DL (ref 70–110)
HCT VFR BLD AUTO: 42.2 % (ref 37–48.5)
HGB BLD-MCNC: 13.6 G/DL (ref 12–16)
INR PPP: 0.9 (ref 0.8–1.2)
LYMPHOCYTES # BLD AUTO: 3.1 K/UL (ref 1–4.8)
LYMPHOCYTES NFR BLD: 44.4 % (ref 18–48)
MCH RBC QN AUTO: 26.6 PG (ref 27–31)
MCHC RBC AUTO-ENTMCNC: 32.2 G/DL (ref 32–36)
MCV RBC AUTO: 83 FL (ref 82–98)
MONOCYTES # BLD AUTO: 0.5 K/UL (ref 0.3–1)
MONOCYTES NFR BLD: 7 % (ref 4–15)
NEUTROPHILS # BLD AUTO: 3.1 K/UL (ref 1.8–7.7)
NEUTROPHILS NFR BLD: 44.8 % (ref 38–73)
PLATELET # BLD AUTO: 317 K/UL (ref 150–350)
PMV BLD AUTO: 10.3 FL (ref 9.2–12.9)
POTASSIUM SERPL-SCNC: 4.6 MMOL/L (ref 3.5–5.1)
PROT SERPL-MCNC: 7.3 G/DL (ref 6–8.4)
PROTHROMBIN TIME: 10.2 SEC (ref 9–12.5)
RBC # BLD AUTO: 5.11 M/UL (ref 4–5.4)
SODIUM SERPL-SCNC: 138 MMOL/L (ref 136–145)
WBC # BLD AUTO: 6.98 K/UL (ref 3.9–12.7)

## 2020-01-06 PROCEDURE — 80053 COMPREHEN METABOLIC PANEL: CPT

## 2020-01-06 PROCEDURE — 85025 COMPLETE CBC W/AUTO DIFF WBC: CPT

## 2020-01-06 PROCEDURE — 85610 PROTHROMBIN TIME: CPT

## 2020-01-06 PROCEDURE — 96372 THER/PROPH/DIAG INJ SC/IM: CPT

## 2020-01-06 PROCEDURE — 25000003 PHARM REV CODE 250: Performed by: EMERGENCY MEDICINE

## 2020-01-06 PROCEDURE — 63600175 PHARM REV CODE 636 W HCPCS: Performed by: EMERGENCY MEDICINE

## 2020-01-06 PROCEDURE — 99284 EMERGENCY DEPT VISIT MOD MDM: CPT | Mod: 25

## 2020-01-06 RX ORDER — ACETAMINOPHEN 500 MG
500 TABLET ORAL EVERY 6 HOURS PRN
Qty: 28 TABLET | Refills: 0 | Status: SHIPPED | OUTPATIENT
Start: 2020-01-06 | End: 2022-05-30

## 2020-01-06 RX ORDER — KETOROLAC TROMETHAMINE 30 MG/ML
30 INJECTION, SOLUTION INTRAMUSCULAR; INTRAVENOUS
Status: COMPLETED | OUTPATIENT
Start: 2020-01-06 | End: 2020-01-06

## 2020-01-06 RX ORDER — ONDANSETRON 2 MG/ML
4 INJECTION INTRAMUSCULAR; INTRAVENOUS
Status: DISCONTINUED | OUTPATIENT
Start: 2020-01-06 | End: 2020-01-06

## 2020-01-06 RX ORDER — IBUPROFEN 600 MG/1
600 TABLET ORAL EVERY 6 HOURS PRN
Qty: 28 TABLET | Refills: 0 | OUTPATIENT
Start: 2020-01-06 | End: 2022-05-24

## 2020-01-06 RX ORDER — ONDANSETRON 4 MG/1
4 TABLET, ORALLY DISINTEGRATING ORAL
Status: COMPLETED | OUTPATIENT
Start: 2020-01-06 | End: 2020-01-06

## 2020-01-06 RX ADMIN — KETOROLAC TROMETHAMINE 30 MG: 30 INJECTION, SOLUTION INTRAMUSCULAR at 09:01

## 2020-01-06 RX ADMIN — ONDANSETRON 4 MG: 4 TABLET, ORALLY DISINTEGRATING ORAL at 09:01

## 2020-01-06 NOTE — ED TRIAGE NOTES
Pt presents to the ER with c/o excessive vaginal bleeding.  Pt states she was here yesterday morning with the same complaint but she states she feels the problem was resolved.  C/o lower pelvic pain rated 8/10.

## 2020-01-06 NOTE — ED PROVIDER NOTES
Encounter Date: 1/6/2020    SCRIBE #1 NOTE: I, Tiki Tomas, am scribing for, and in the presence of,  Luisito Shah Jr, MD. I have scribed the entire note.       History     Chief Complaint   Patient presents with    Vaginal Bleeding     Seen here earlier this morning with c/o heavy vaginal bleeding. States bleeding has continued to be heavy and now feels dizzy. Presents awake, alert, oriented. No distress.      Jose Hernandez is a 29 y.o. female who  has no past medical history on file.    The patient presents to the ED due to vaginal bleeding since yesterday and has not kept count as to how many tampons/pads she has used. Her associated symptoms include cough, chills, weakness, and intermittent cramping abdominal pain (not present at exam). The patient denies blood clots, fever, or any other concerning symptoms. She never had any surgeries and has no drug allergies. Her OB/GYN is Dr. Martinez. She has had irregular periods for prior to this and has been prescribed provera but has not taken it. She reports dizziness intermittently bu denies numbness or shortness of breath.     The history is provided by the patient.     Review of patient's allergies indicates:  No Known Allergies  History reviewed. No pertinent past medical history.  History reviewed. No pertinent surgical history.  History reviewed. No pertinent family history.  Social History     Tobacco Use    Smoking status: Current Every Day Smoker    Smokeless tobacco: Never Used   Substance Use Topics    Alcohol use: Yes    Drug use: Never     Review of Systems   Constitutional: Positive for chills. Negative for fever.   HENT: Negative for congestion and sore throat.    Eyes: Negative for visual disturbance.   Respiratory: Positive for cough. Negative for shortness of breath.    Cardiovascular: Negative for chest pain.   Gastrointestinal: Positive for abdominal pain (intermittent cramping). Negative for diarrhea, nausea and vomiting.    Genitourinary: Positive for vaginal bleeding. Negative for hematuria.   Musculoskeletal: Negative for back pain.   Skin: Negative for rash and wound.   Neurological: Positive for dizziness and weakness. Negative for numbness.   Psychiatric/Behavioral: Negative for agitation, behavioral problems and confusion.       Physical Exam     Initial Vitals [01/06/20 0707]   BP Pulse Resp Temp SpO2   (!) 147/83 98 16 98.7 °F (37.1 °C) 99 %      MAP       --         Physical Exam    Nursing note and vitals reviewed.  Constitutional: She appears well-developed and well-nourished. She is not diaphoretic. No distress.   Anxious appearing   HENT:   Head: Normocephalic and atraumatic.   Right Ear: Tympanic membrane normal.   Left Ear: Tympanic membrane normal.   Mouth/Throat: Oropharynx is clear and moist.   Eyes: Conjunctivae and EOM are normal. Pupils are equal, round, and reactive to light.   Neck: Normal range of motion. Neck supple.   Cardiovascular: Normal rate, regular rhythm and normal heart sounds. Exam reveals no gallop and no friction rub.    No murmur heard.  Pulmonary/Chest: Breath sounds normal. She has no wheezes. She has no rhonchi. She has no rales.   Abdominal: Soft. Bowel sounds are normal. There is no tenderness. There is no rebound and no guarding.   Genitourinary:   Genitourinary Comments: Pelvic: Mild bleeding noted from cervix. No apparent clots.    Musculoskeletal: Normal range of motion. She exhibits no edema or tenderness.   Lymphadenopathy:     She has no cervical adenopathy.   Neurological: She is alert and oriented to person, place, and time. She has normal strength. No cranial nerve deficit or sensory deficit.   Skin: Skin is warm and dry. Capillary refill takes less than 2 seconds. No rash noted.         ED Course   Procedures  Labs Reviewed   CBC W/ AUTO DIFFERENTIAL - Abnormal; Notable for the following components:       Result Value    Mean Corpuscular Hemoglobin 26.6 (*)     RDW 15.2 (*)      All other components within normal limits   PROTIME-INR   COMPREHENSIVE METABOLIC PANEL          Imaging Results    None          Medical Decision Making:   History:   Old Medical Records: I decided to obtain old medical records.  Initial Assessment:   Patient with heavy menses after history of irregular periods. Has associated weakness, dizziness and abdominal cramping. Abdomen is benign today. Will obtain labs and evaluate for symptomatic anemia, give IVF and reassess.   Differential Diagnosis:   Differential Diagnosis includes, but is not limited to:  Pregnancy complication (threatened AB, inevitable AB, incomplete Ab, missed AB, ectopic pregnancy, placenta previa) normal menses, STD, foreign body, trauma, uterine fibroids, uterine ca, dysfunctional uterine bleeding    Clinical Tests:   Lab Tests: Reviewed and Ordered  ED Management:  After taking into careful account the historical factors and physical exam findings of the patient's presentation today, in conjunction with the empirical and objective data obtained on ED workup, no acute emergent medical condition has been identified. The patient appears to be low risk for an emergent medical condition and I feel it is safe and appropriate at this time for the patient to be discharged to follow-up as detailed in their discharge instructions for reevaluation and possible continued outpatient workup and management. I have discussed the specifics of the workup with the patient and the patient has verbalized understanding of the details of the workup, the diagnosis, the treatment plan, and the need for outpatient follow-up.  Although the patient has no emergent etiology today this does not preclude the development of an emergent condition so in addition, I have advised the patient that they can return to the ED and/or activate EMS at any time with worsening of their symptoms, change of their symptoms, or with any other medical complaint.  The patient remained  comfortable and stable during their visit in the ED.  Discharge and follow-up instructions discussed with the patient who expressed understanding and willingness to comply with my recommendations.                     ED Course as of Jan 08 1051   Mon Jan 06, 2020   0926 Patient is refusing IVF and requesting discharge. Labs are without significant abnormalities. Doubt symptomatic anemia at this time.  Return precautions for worsening symptoms or any other concerns.      [RN]      ED Course User Index  [RN] Luisito Shah Jr., MD                Clinical Impression:       ICD-10-CM ICD-9-CM   1. Vaginal bleeding N93.9 623.8                        Scribe Attestation I, Luisito Shah,  personally performed the services described in this documentation. All medical record entries made by the scribe were at my direction and in my presence.  I have reviewed the chart and agree that the record reflects my personal performance and is accurate and complete. Luisito Shah M.D. 10:48 AM01/08/2020        Portions of this note were dictated using voice recognition software and may contain dictation related errors in spelling/grammar/syntax not found on text review         Luisito Shah Jr., MD  01/08/20 1048       Luisito Shah Jr., MD  01/08/20 1052

## 2020-01-06 NOTE — TELEPHONE ENCOUNTER
Reason for Disposition   Patient sounds very sick or weak to the triager    Additional Information   Negative: Shock suspected (e.g., cold/pale/clammy skin, too weak to stand, low BP, rapid pulse)   Negative: Difficult to awaken or acting confused (e.g., disoriented, slurred speech)   Negative: Sounds like a life-threatening emergency to the triager   Negative: Recent (within last 24 hours) medical visit for an injury   Negative: Recent surgery or surgical procedure   Negative: Recent discharge from the hospital   Negative: Asthma attack diagnosed recently   Negative: Flu (influenza) diagnosed recently   Negative: Ear infection (otitis media, middle ear infection) diagnosed recently   Negative: Ear infection (otitis externa, swimmer's ear) diagnosed recently   Negative: [1] Sinus infection AND [2] taking an antibiotic   Negative: Skin infection (cellulitis) diagnosed recently   Negative: Strep throat (strep pharyngitis) diagnosed recently   Negative: Threatened miscarriage (threatened ) recently diagnosed   Negative: Urine infection (FEMALE; cystitis, pyelonephritis, urethritis) ) diagnosed recently   Negative: Urine infection (MALE; cystitis, pyelonephritis, prostatitis, epidydimitis, orchitis, urethritis) diagnosed recently   Negative: Taking antibiotic for other infection   Negative: More than 24 hours since medical visit   Negative: [1] Drinking very little AND [2] dehydration suspected (e.g., no urine > 12 hours, very dry mouth, very lightheaded)    Protocols used: RECENT [facility]

## 2020-01-07 ENCOUNTER — TELEPHONE (OUTPATIENT)
Dept: OBSTETRICS AND GYNECOLOGY | Facility: CLINIC | Age: 30
End: 2020-01-07

## 2020-01-07 NOTE — TELEPHONE ENCOUNTER
----- Message from Estela Buck sent at 1/7/2020  3:06 PM CST -----  Contact: [phone]/self   Type:  Same Day Appointment Request    Caller is requesting a same day appointment.  Caller declined first available appointment listed below.    Name of Caller:Pt   When is the first available appointment?01-16-20  Symptoms:Heavy cycle and ED f/u   Best Call Back Number:[phone]   Additional Information:

## 2020-01-07 NOTE — TELEPHONE ENCOUNTER
Spoke with patient.  Informed patient of how Dr. Martinez wants her to take the Provera 10mg (10 days out of the month until her cycles come monthly.  Also informed patient that her 1st cycle will most likely be heavy because the lining of her uterus is thickened and we want her to shed that lining.  Patient verbalized understanding.  Also instructed patient to contact us to let us know about how she's doing incase we need to see her back sooner than an annual visit.  Verbalized understanding.

## 2020-01-08 ENCOUNTER — CLINICAL SUPPORT (OUTPATIENT)
Dept: OBSTETRICS AND GYNECOLOGY | Facility: CLINIC | Age: 30
End: 2020-01-08
Payer: MEDICAID

## 2020-01-08 ENCOUNTER — OFFICE VISIT (OUTPATIENT)
Dept: OBSTETRICS AND GYNECOLOGY | Facility: CLINIC | Age: 30
End: 2020-01-08
Payer: MEDICAID

## 2020-01-08 VITALS
HEIGHT: 62 IN | SYSTOLIC BLOOD PRESSURE: 108 MMHG | DIASTOLIC BLOOD PRESSURE: 74 MMHG | WEIGHT: 102.13 LBS | BODY MASS INDEX: 18.8 KG/M2

## 2020-01-08 DIAGNOSIS — N93.9 ABNORMAL UTERINE BLEEDING (AUB): Primary | ICD-10-CM

## 2020-01-08 DIAGNOSIS — R10.2 PELVIC PAIN: Primary | ICD-10-CM

## 2020-01-08 DIAGNOSIS — N94.6 DYSMENORRHEA: ICD-10-CM

## 2020-01-08 PROCEDURE — 99213 OFFICE O/P EST LOW 20 MIN: CPT | Mod: PBBFAC,PO | Performed by: OBSTETRICS & GYNECOLOGY

## 2020-01-08 PROCEDURE — 99213 PR OFFICE/OUTPT VISIT, EST, LEVL III, 20-29 MIN: ICD-10-PCS | Mod: S$PBB,,, | Performed by: OBSTETRICS & GYNECOLOGY

## 2020-01-08 PROCEDURE — 99999 PR PBB SHADOW E&M-EST. PATIENT-LVL III: CPT | Mod: PBBFAC,,, | Performed by: OBSTETRICS & GYNECOLOGY

## 2020-01-08 PROCEDURE — 99999 PR PBB SHADOW E&M-EST. PATIENT-LVL III: ICD-10-PCS | Mod: PBBFAC,,, | Performed by: OBSTETRICS & GYNECOLOGY

## 2020-01-08 PROCEDURE — 99213 OFFICE O/P EST LOW 20 MIN: CPT | Mod: S$PBB,,, | Performed by: OBSTETRICS & GYNECOLOGY

## 2020-01-08 RX ORDER — FLUCONAZOLE 150 MG/1
TABLET ORAL
COMMUNITY
Start: 2019-12-05 | End: 2022-05-30

## 2020-01-08 RX ORDER — TRAMADOL HYDROCHLORIDE 50 MG/1
50 TABLET ORAL EVERY 6 HOURS PRN
Qty: 20 TABLET | Refills: 0 | Status: SHIPPED | OUTPATIENT
Start: 2020-01-08 | End: 2021-01-07

## 2020-01-08 RX ORDER — DOXYCYCLINE HYCLATE 100 MG
TABLET ORAL
COMMUNITY
Start: 2019-12-05 | End: 2022-05-30

## 2020-01-08 RX ORDER — KETOROLAC TROMETHAMINE 30 MG/ML
30 INJECTION, SOLUTION INTRAMUSCULAR; INTRAVENOUS ONCE
Status: SHIPPED | OUTPATIENT
Start: 2020-01-08 | End: 2020-01-11

## 2020-01-08 NOTE — PROGRESS NOTES
Chief Complaint   Patient presents with    Abdominal Pain       HISTORY OF PRESENT ILLNESS:   Jose Hernandez is a 29 y.o. female  With PCOS who presents for AUB-O.  No LMP recorded (lmp unknown)..  She started having bleeding 2 days ago and cramps that felt like contractions. Was bleeding and changing a pad every 20 minutes but today reports very light bleeding but still cramping. She reports she is starting a new job and has to go to work and wants to know if we can do an epidural or block. She started taking the provera yesterday. She reports the toradol helped her in the ER but the ibuprofen 800 isn't helping. She was upset and wants something to make all the pain stop.      History reviewed. No pertinent past medical history.     History reviewed. No pertinent surgical history.      Social History     Socioeconomic History    Marital status: Single     Spouse name: Not on file    Number of children: Not on file    Years of education: Not on file    Highest education level: Not on file   Occupational History    Not on file   Social Needs    Financial resource strain: Not on file    Food insecurity:     Worry: Not on file     Inability: Not on file    Transportation needs:     Medical: Not on file     Non-medical: Not on file   Tobacco Use    Smoking status: Current Every Day Smoker    Smokeless tobacco: Never Used   Substance and Sexual Activity    Alcohol use: Yes    Drug use: Never    Sexual activity: Yes     Partners: Male   Lifestyle    Physical activity:     Days per week: Not on file     Minutes per session: Not on file    Stress: Not on file   Relationships    Social connections:     Talks on phone: Not on file     Gets together: Not on file     Attends Zoroastrianism service: Not on file     Active member of club or organization: Not on file     Attends meetings of clubs or organizations: Not on file     Relationship status: Not on file   Other Topics Concern    Not on file   Social  "History Narrative    Not on file       History reviewed. No pertinent family history.      OB History    Para Term  AB Living   2 2 1 1   2   SAB TAB Ectopic Multiple Live Births                  # Outcome Date GA Lbr Kev/2nd Weight Sex Delivery Anes PTL Lv   2       CS-Unspec      1 Term      CS-Unspec            COMPREHENSIVE GYN HISTORY:  PAP History: reports has abnormal pap smear  Infection History: Denies STDs. Denies PID.  Benign History:Denies uterine fibroids. Denies ovarian cysts. Denies endometriosis Denies other conditions. Was told she has polycystic appearing ovaries on US   Cancer History: Denies cervical cancer. Denies uterine cancer or hyperplasia. Denies ovarian cancer. Denies vulvar cancer or pre-cancer. Denies vaginal cancer or pre-cancer. Denies breast cancer. Denies colon cancer.  Cycle:   No contraception    ROS:  GENERAL: Denies weight gain or weight loss. Feeling well overall.   SKIN: Denies rash or lesions.   HEAD: Denies headache.   NODES: Denies enlarged lymph nodes.   CHEST: Denies shortness of breath.   ABDOMEN: No abdominal pain, constipation, diarrhea, nausea, vomiting or rectal bleeding.   URINARY: No frequency, dysuria, hematuria, or burning on urination.  REPRODUCTIVE: See HPI.   BREASTS: The patient denies pain, lumps, or nipple discharge.       /74   Ht 5' 2" (1.575 m)   Wt 46.3 kg (102 lb 1.6 oz)   LMP  (LMP Unknown)   BMI 18.67 kg/m²     APPEARANCE: Well nourished, well developed, in no acute distress.  Abdomen: diffusely tender, no rebound or guarding  PELVIC:   VULVA: No lesions. Normal female genitalia.  URETHRAL MEATUS: Normal size and location, no lesions, no prolapse.  URETHRA: No masses, tenderness, prolapse or scarring.  VAGINA: Moist and well rugated, no discharge, no significant cystocele or rectocele.  CERVIX: No lesions and discharge.  UTERUS: Normal size, regular shape, mobile, non-tender, bladder base nontender.  ADNEXA: No " masses or tenderness.    TSH was normal   12/18/19: Uterus: Size: 7.2 x 5.1 x 4.4 cm Masses: None  Endometrium: Normal in this pre menopausal patient, measuring 10 mm. \  Right ovary: Size: 4.2 x 3.4 x 2.4 cm Appearance: Normal Vascular flow: Normal.  Left ovary: Size: 3.2 x 3 x 1.6 cm Appearance: Normal Vascular Flow: Normal.  Free Fluid: None.    WBC 3.90 - 12.70 K/uL 6.98    RBC 4.00 - 5.40 M/uL 5.11    Hemoglobin 12.0 - 16.0 g/dL 13.6    Hematocrit 37.0 - 48.5 % 42.2    Mean Corpuscular Volume 82 - 98 fL 83    Mean Corpuscular Hemoglobin 27.0 - 31.0 pg 26.6Low     Mean Corpuscular Hemoglobin Conc 32.0 - 36.0 g/dL 32.2    RDW 11.5 - 14.5 % 15.2High     Platelets 150 - 350 K/uL 317          1. Abnormal uterine bleeding (AUB)    2. Dysmenorrhea        Plan:  1. Exam normal. CBC yesterday was normal. Discussed with her that since she hasn't had a cycle in 4 months that the first cycle is typically heavy and can have a lot of cramping but we need that to flush the thickened lining out. Discussed with her that we can do a toradol shot here but that there is not the option of an epidural. She wants to get the toradol 30 mg IM for the next 2 days so she can go to work. She started the provera and discussed with her that will likely make the cycle stop but once she stops the provera the bleeding will start again and she understands.  Will do ultram for break through pain. We discussed how to take the provera so she has monthly cycles so this doesn't happen where she goes monthly without it again      F/u in 1 yr or PRN

## 2020-01-08 NOTE — PROGRESS NOTES
1/8/2020 @ 11:10AM: Contraindication list reviewed with patient prior to administering injection.  No contraindications noted. Ketorolac Tromethamine 30mg/1ml administered to right gluteal area per 's instructions. Patient tolerated well and instructed to wait 15 minutes prior to leaving office for any adverse reactions.  Patient verbalized understanding.      Lot #: [id number]  Exp. Date: 5/2021

## 2020-01-09 ENCOUNTER — CLINICAL SUPPORT (OUTPATIENT)
Dept: OBSTETRICS AND GYNECOLOGY | Facility: CLINIC | Age: 30
End: 2020-01-09
Payer: MEDICAID

## 2020-01-09 DIAGNOSIS — R10.2 PELVIC PAIN: Primary | ICD-10-CM

## 2020-01-09 RX ORDER — KETOROLAC TROMETHAMINE 30 MG/ML
30 INJECTION, SOLUTION INTRAMUSCULAR; INTRAVENOUS
Status: SHIPPED | OUTPATIENT
Start: 2020-01-09 | End: 2020-01-12

## 2020-01-09 NOTE — PROGRESS NOTES
1/9/2020 @ 8:55AM: Contraindication list reviewed with patient prior to administering injection.  No contraindications noted. Ketorolac Tromethamine 30mg/1ml administered to left gluteal area per 's instructions. Patient tolerated well and instructed to wait 15 minutes prior to leaving office for any adverse reactions.  Patient verbalized understanding.      Lot #: [id number]  Exp. Date: 5/2021

## 2020-07-22 PROBLEM — Z74.09 IMPAIRED MOBILITY: Status: ACTIVE | Noted: 2020-07-22

## 2020-07-22 PROBLEM — Z74.09 IMPAIRED FUNCTIONAL MOBILITY AND ACTIVITY TOLERANCE: Status: ACTIVE | Noted: 2020-07-22

## 2020-08-19 ENCOUNTER — LAB VISIT (OUTPATIENT)
Dept: LAB | Facility: HOSPITAL | Age: 30
End: 2020-08-19
Attending: OBSTETRICS & GYNECOLOGY
Payer: MEDICAID

## 2020-08-19 ENCOUNTER — OFFICE VISIT (OUTPATIENT)
Dept: OBSTETRICS AND GYNECOLOGY | Facility: CLINIC | Age: 30
End: 2020-08-19
Payer: MEDICAID

## 2020-08-19 VITALS — WEIGHT: 234.56 LBS | SYSTOLIC BLOOD PRESSURE: 110 MMHG | BODY MASS INDEX: 42.9 KG/M2 | DIASTOLIC BLOOD PRESSURE: 70 MMHG

## 2020-08-19 DIAGNOSIS — Z11.3 SCREENING EXAMINATION FOR STD (SEXUALLY TRANSMITTED DISEASE): ICD-10-CM

## 2020-08-19 DIAGNOSIS — Z01.419 ENCOUNTER FOR ANNUAL ROUTINE GYNECOLOGICAL EXAMINATION: ICD-10-CM

## 2020-08-19 DIAGNOSIS — Z01.419 ENCOUNTER FOR ANNUAL ROUTINE GYNECOLOGICAL EXAMINATION: Primary | ICD-10-CM

## 2020-08-19 DIAGNOSIS — Z87.42 HISTORY OF ABNORMAL CERVICAL PAP SMEAR: ICD-10-CM

## 2020-08-19 DIAGNOSIS — Z12.4 PAP SMEAR FOR CERVICAL CANCER SCREENING: ICD-10-CM

## 2020-08-19 PROCEDURE — 99213 OFFICE O/P EST LOW 20 MIN: CPT | Mod: PBBFAC,PO | Performed by: OBSTETRICS & GYNECOLOGY

## 2020-08-19 PROCEDURE — 88175 CYTOPATH C/V AUTO FLUID REDO: CPT | Performed by: PATHOLOGY

## 2020-08-19 PROCEDURE — 80074 ACUTE HEPATITIS PANEL: CPT

## 2020-08-19 PROCEDURE — 99999 PR PBB SHADOW E&M-EST. PATIENT-LVL III: ICD-10-PCS | Mod: PBBFAC,,, | Performed by: OBSTETRICS & GYNECOLOGY

## 2020-08-19 PROCEDURE — 86703 HIV-1/HIV-2 1 RESULT ANTBDY: CPT

## 2020-08-19 PROCEDURE — 88141 PR  CYTOPATH CERV/VAG INTERPRET: ICD-10-PCS | Mod: ,,, | Performed by: PATHOLOGY

## 2020-08-19 PROCEDURE — 99999 PR PBB SHADOW E&M-EST. PATIENT-LVL III: CPT | Mod: PBBFAC,,, | Performed by: OBSTETRICS & GYNECOLOGY

## 2020-08-19 PROCEDURE — 87491 CHLMYD TRACH DNA AMP PROBE: CPT

## 2020-08-19 PROCEDURE — 88141 CYTOPATH C/V INTERPRET: CPT | Mod: ,,, | Performed by: PATHOLOGY

## 2020-08-19 PROCEDURE — 87624 HPV HI-RISK TYP POOLED RSLT: CPT

## 2020-08-19 PROCEDURE — 99395 PR PREVENTIVE VISIT,EST,18-39: ICD-10-PCS | Mod: S$PBB,,, | Performed by: OBSTETRICS & GYNECOLOGY

## 2020-08-19 PROCEDURE — 99395 PREV VISIT EST AGE 18-39: CPT | Mod: S$PBB,,, | Performed by: OBSTETRICS & GYNECOLOGY

## 2020-08-19 PROCEDURE — 86592 SYPHILIS TEST NON-TREP QUAL: CPT

## 2020-08-19 PROCEDURE — 36415 COLL VENOUS BLD VENIPUNCTURE: CPT

## 2020-08-19 RX ORDER — MELOXICAM 15 MG/1
15 TABLET ORAL
COMMUNITY
End: 2022-05-30

## 2020-08-19 NOTE — PROGRESS NOTES
Chief Complaint   Patient presents with    Annual Exam       HISTORY OF PRESENT ILLNESS:   Jose Hernandez is a 29 y.o. female with PCOS who presents for annual exam.  No LMP recorded..   Cycles have been normal since I saw her last without intervention. No complaints, desires STD testing.      History reviewed. No pertinent past medical history.     History reviewed. No pertinent surgical history.      Social History     Socioeconomic History    Marital status: Single     Spouse name: Not on file    Number of children: Not on file    Years of education: Not on file    Highest education level: Not on file   Occupational History    Not on file   Social Needs    Financial resource strain: Not on file    Food insecurity     Worry: Not on file     Inability: Not on file    Transportation needs     Medical: Not on file     Non-medical: Not on file   Tobacco Use    Smoking status: Current Every Day Smoker    Smokeless tobacco: Never Used   Substance and Sexual Activity    Alcohol use: Yes    Drug use: Never    Sexual activity: Yes     Partners: Male   Lifestyle    Physical activity     Days per week: Not on file     Minutes per session: Not on file    Stress: Not on file   Relationships    Social connections     Talks on phone: Not on file     Gets together: Not on file     Attends Quaker service: Not on file     Active member of club or organization: Not on file     Attends meetings of clubs or organizations: Not on file     Relationship status: Not on file   Other Topics Concern    Not on file   Social History Narrative    Not on file       History reviewed. No pertinent family history.      OB History    Para Term  AB Living   2 2 1 1   2   SAB TAB Ectopic Multiple Live Births                  # Outcome Date GA Lbr Kev/2nd Weight Sex Delivery Anes PTL Lv   2       CS-Unspec      1 Term      CS-Unspec            COMPREHENSIVE GYN HISTORY:  PAP History: reports has  abnormal pap smear, about 9534-0187 therese, 2019 NILM/HPV-  Infection History: Denies STDs. Denies PID.  Benign History:Denies uterine fibroids. Denies ovarian cysts. Denies endometriosis Denies other conditions. Was told she has polycystic appearing ovaries on US   Cancer History: Denies cervical cancer. Denies uterine cancer or hyperplasia. Denies ovarian cancer. Denies vulvar cancer or pre-cancer. Denies vaginal cancer or pre-cancer. Denies breast cancer. Denies colon cancer.  Cycle: 11/mon/3d  No contraception    ROS:  GENERAL: Denies weight gain or weight loss. Feeling well overall.   SKIN: Denies rash or lesions.   HEAD: Denies headache.   NODES: Denies enlarged lymph nodes.   CHEST: Denies shortness of breath.   ABDOMEN: No abdominal pain, constipation, diarrhea, nausea, vomiting or rectal bleeding.   URINARY: No frequency, dysuria, hematuria, or burning on urination.  REPRODUCTIVE: See HPI.   BREASTS: The patient denies pain, lumps, or nipple discharge.       /70   Wt 106.4 kg (234 lb 9.1 oz)   BMI 42.90 kg/m²     APPEARANCE: Well nourished, well developed, in no acute distress.  Breasts: normal in appearance, with well healed tract lesions in axilla, no nipple discharge, no masses or lymphadenopathy   PELVIC:   VULVA: No lesions. Normal female genitalia.  URETHRAL MEATUS: Normal size and location, no lesions, no prolapse.  URETHRA: No masses, tenderness, prolapse or scarring.  VAGINA: Moist and well rugated, no discharge, no significant cystocele or rectocele.  CERVIX: No lesions and discharge.  UTERUS: Normal size, regular shape, mobile, non-tender, bladder base nontender.  ADNEXA: No masses or tenderness.    TSH was normal     1. Encounter for annual routine gynecological examination    2. Pap smear for cervical cancer screening    3. Screening examination for STD (sexually transmitted disease)    4. History of abnormal cervical Pap smear        Plan:  1. Pap smear repeated today and if still normal  than can space to Q5 years  2. Std testing ordered   3. Diagnosed with DM so working on weight loss and diet     F/u in 1 yr or PRN

## 2020-08-20 ENCOUNTER — PATIENT MESSAGE (OUTPATIENT)
Dept: OBSTETRICS AND GYNECOLOGY | Facility: CLINIC | Age: 30
End: 2020-08-20

## 2020-08-20 LAB
HAV IGM SERPL QL IA: NEGATIVE
HBV CORE IGM SERPL QL IA: NEGATIVE
HBV SURFACE AG SERPL QL IA: NEGATIVE
HBV SURFACE AG SERPL QL IA: NEGATIVE
HCV AB SERPL QL IA: NEGATIVE
HIV 1+2 AB+HIV1 P24 AG SERPL QL IA: NEGATIVE
RPR SER QL: NORMAL

## 2020-08-25 ENCOUNTER — PATIENT MESSAGE (OUTPATIENT)
Dept: OBSTETRICS AND GYNECOLOGY | Facility: CLINIC | Age: 30
End: 2020-08-25

## 2020-08-28 ENCOUNTER — PATIENT MESSAGE (OUTPATIENT)
Dept: OBSTETRICS AND GYNECOLOGY | Facility: CLINIC | Age: 30
End: 2020-08-28

## 2020-08-28 LAB
HPV HR 12 DNA SPEC QL NAA+PROBE: POSITIVE
HPV16 AG SPEC QL: NEGATIVE
HPV18 DNA SPEC QL NAA+PROBE: NEGATIVE

## 2020-09-02 PROBLEM — Z74.09 IMPAIRED FUNCTIONAL MOBILITY AND ACTIVITY TOLERANCE: Status: RESOLVED | Noted: 2020-07-22 | Resolved: 2020-09-02

## 2020-09-02 PROBLEM — Z74.09 IMPAIRED MOBILITY: Status: RESOLVED | Noted: 2020-07-22 | Resolved: 2020-09-02

## 2020-09-02 LAB
FINAL PATHOLOGIC DIAGNOSIS: NORMAL
Lab: NORMAL

## 2020-09-09 ENCOUNTER — TELEPHONE (OUTPATIENT)
Dept: OBSTETRICS AND GYNECOLOGY | Facility: CLINIC | Age: 30
End: 2020-09-09

## 2020-09-09 NOTE — TELEPHONE ENCOUNTER
Called patient to review HPV +, NILM pap smear and recommend repeat pap smear next year. All questions answered and she expressed understanding for recommendations.

## 2020-09-12 LAB
C TRACH DNA SPEC QL NAA+PROBE: NOT DETECTED
N GONORRHOEA DNA SPEC QL NAA+PROBE: NOT DETECTED

## 2020-09-16 ENCOUNTER — TELEPHONE (OUTPATIENT)
Dept: OBSTETRICS AND GYNECOLOGY | Facility: HOSPITAL | Age: 30
End: 2020-09-16

## 2020-12-21 ENCOUNTER — CLINICAL SUPPORT (OUTPATIENT)
Dept: SMOKING CESSATION | Facility: CLINIC | Age: 30
End: 2020-12-21

## 2020-12-21 DIAGNOSIS — F17.200 NICOTINE DEPENDENCE: Primary | ICD-10-CM

## 2020-12-21 PROCEDURE — 99404 PREV MED CNSL INDIV APPRX 60: CPT | Mod: S$GLB,,,

## 2020-12-21 PROCEDURE — 99999 PR PBB SHADOW E&M-EST. PATIENT-LVL I: ICD-10-PCS | Mod: PBBFAC,,,

## 2020-12-21 PROCEDURE — 99999 PR PBB SHADOW E&M-EST. PATIENT-LVL I: CPT | Mod: PBBFAC,,,

## 2020-12-21 PROCEDURE — 99404 PR PREVENT COUNSEL,INDIV,60 MIN: ICD-10-PCS | Mod: S$GLB,,,

## 2020-12-21 RX ORDER — VARENICLINE TARTRATE 0.5 (11)-1
KIT ORAL
Qty: 53 TABLET | Refills: 0 | Status: SHIPPED | OUTPATIENT
Start: 2020-12-21 | End: 2021-01-19 | Stop reason: DRUGHIGH

## 2020-12-21 NOTE — PROGRESS NOTES
First time in program. The patient is smoking 20 cigarettes/day and quit for 6 months cold turkey. She is ready to quit for her diabetes, health and cost. Discussed the options of patches, Wellbutrin, which she declined and Chantix, and she elected to try the Chantix. She did state she has vivid dreams, but she felt she would be ok. Discussed in detail the side effects and outcome of Chantix. The patient will continue individual  and/or group sessions and medication monitoring by CTT[facility]. Prescribed medication management will be by [facility] Medical Practitioner. The patient denies any abnormal behavioral or mental changes at this time.

## 2021-01-04 ENCOUNTER — CLINICAL SUPPORT (OUTPATIENT)
Dept: SMOKING CESSATION | Facility: CLINIC | Age: 31
End: 2021-01-04

## 2021-01-04 VITALS — TEMPERATURE: 97 F

## 2021-01-04 DIAGNOSIS — F17.200 NICOTINE DEPENDENCE: Primary | ICD-10-CM

## 2021-01-04 PROCEDURE — 99999 PR PBB SHADOW E&M-EST. PATIENT-LVL I: CPT | Mod: PBBFAC,,,

## 2021-01-04 PROCEDURE — 99403 PREV MED CNSL INDIV APPRX 45: CPT | Mod: S$GLB,,,

## 2021-01-04 PROCEDURE — 99999 PR PBB SHADOW E&M-EST. PATIENT-LVL I: ICD-10-PCS | Mod: PBBFAC,,,

## 2021-01-04 PROCEDURE — 99403 PR PREVENT COUNSEL,INDIV,45 MIN: ICD-10-PCS | Mod: S$GLB,,,

## 2021-01-12 ENCOUNTER — TELEPHONE (OUTPATIENT)
Dept: SMOKING CESSATION | Facility: CLINIC | Age: 31
End: 2021-01-12

## 2021-01-13 ENCOUNTER — CLINICAL SUPPORT (OUTPATIENT)
Dept: SMOKING CESSATION | Facility: CLINIC | Age: 31
End: 2021-01-13

## 2021-01-13 DIAGNOSIS — F17.200 NICOTINE DEPENDENCE: Primary | ICD-10-CM

## 2021-01-13 PROCEDURE — 99402 PREV MED CNSL INDIV APPRX 30: CPT | Mod: S$GLB,,,

## 2021-01-13 PROCEDURE — 99402 PR PREVENT COUNSEL,INDIV,30 MIN: ICD-10-PCS | Mod: S$GLB,,,

## 2021-01-19 ENCOUNTER — CLINICAL SUPPORT (OUTPATIENT)
Dept: SMOKING CESSATION | Facility: CLINIC | Age: 31
End: 2021-01-19

## 2021-01-19 DIAGNOSIS — F17.200 NICOTINE DEPENDENCE: Primary | ICD-10-CM

## 2021-01-19 PROCEDURE — 99999 PR PBB SHADOW E&M-EST. PATIENT-LVL I: ICD-10-PCS | Mod: PBBFAC,,,

## 2021-01-19 PROCEDURE — 99402 PREV MED CNSL INDIV APPRX 30: CPT | Mod: S$GLB,,,

## 2021-01-19 PROCEDURE — 99999 PR PBB SHADOW E&M-EST. PATIENT-LVL I: CPT | Mod: PBBFAC,,,

## 2021-01-19 PROCEDURE — 99402 PR PREVENT COUNSEL,INDIV,30 MIN: ICD-10-PCS | Mod: S$GLB,,,

## 2021-01-19 RX ORDER — VARENICLINE TARTRATE 1 MG/1
1 TABLET, FILM COATED ORAL 2 TIMES DAILY
Qty: 56 TABLET | Refills: 0 | Status: SHIPPED | OUTPATIENT
Start: 2021-01-19 | End: 2021-02-02 | Stop reason: DRUGHIGH

## 2021-02-02 ENCOUNTER — CLINICAL SUPPORT (OUTPATIENT)
Dept: SMOKING CESSATION | Facility: CLINIC | Age: 31
End: 2021-02-02

## 2021-02-02 DIAGNOSIS — F17.200 NICOTINE DEPENDENCE: Primary | ICD-10-CM

## 2021-02-02 PROCEDURE — 99402 PR PREVENT COUNSEL,INDIV,30 MIN: ICD-10-PCS | Mod: S$GLB,,,

## 2021-02-02 PROCEDURE — 99402 PREV MED CNSL INDIV APPRX 30: CPT | Mod: S$GLB,,,

## 2021-02-02 RX ORDER — VARENICLINE TARTRATE 1 MG/1
1 TABLET, FILM COATED ORAL 2 TIMES DAILY
Qty: 60 TABLET | Refills: 0 | Status: SHIPPED | OUTPATIENT
Start: 2021-02-02 | End: 2022-05-30

## 2021-02-22 ENCOUNTER — OFFICE VISIT (OUTPATIENT)
Dept: SPORTS MEDICINE | Facility: CLINIC | Age: 31
End: 2021-02-22
Payer: MEDICAID

## 2021-02-22 VITALS
DIASTOLIC BLOOD PRESSURE: 76 MMHG | WEIGHT: 234 LBS | SYSTOLIC BLOOD PRESSURE: 110 MMHG | BODY MASS INDEX: 43.06 KG/M2 | HEIGHT: 62 IN

## 2021-02-22 DIAGNOSIS — M21.42 BILATERAL PES PLANUS: ICD-10-CM

## 2021-02-22 DIAGNOSIS — M22.8X2 PATELLAR TRACKING DISORDER, LEFT: ICD-10-CM

## 2021-02-22 DIAGNOSIS — M25.562 CHRONIC PAIN OF LEFT KNEE: Primary | ICD-10-CM

## 2021-02-22 DIAGNOSIS — M21.41 BILATERAL PES PLANUS: ICD-10-CM

## 2021-02-22 DIAGNOSIS — G89.29 CHRONIC PAIN OF LEFT KNEE: Primary | ICD-10-CM

## 2021-02-22 PROCEDURE — 99213 OFFICE O/P EST LOW 20 MIN: CPT | Mod: PBBFAC,PN | Performed by: ORTHOPAEDIC SURGERY

## 2021-02-22 PROCEDURE — 99204 OFFICE O/P NEW MOD 45 MIN: CPT | Mod: S$PBB,,, | Performed by: ORTHOPAEDIC SURGERY

## 2021-02-22 PROCEDURE — 99999 PR PBB SHADOW E&M-EST. PATIENT-LVL III: ICD-10-PCS | Mod: PBBFAC,,, | Performed by: ORTHOPAEDIC SURGERY

## 2021-02-22 PROCEDURE — 99204 PR OFFICE/OUTPT VISIT, NEW, LEVL IV, 45-59 MIN: ICD-10-PCS | Mod: S$PBB,,, | Performed by: ORTHOPAEDIC SURGERY

## 2021-02-22 PROCEDURE — 97110 THERAPEUTIC EXERCISES: CPT | Mod: ,,, | Performed by: ORTHOPAEDIC SURGERY

## 2021-02-22 PROCEDURE — 97110 PR THERAPEUTIC EXERCISES: ICD-10-PCS | Mod: ,,, | Performed by: ORTHOPAEDIC SURGERY

## 2021-02-22 PROCEDURE — 99999 PR PBB SHADOW E&M-EST. PATIENT-LVL III: CPT | Mod: PBBFAC,,, | Performed by: ORTHOPAEDIC SURGERY

## 2021-02-25 ENCOUNTER — TELEPHONE (OUTPATIENT)
Dept: SMOKING CESSATION | Facility: CLINIC | Age: 31
End: 2021-02-25

## 2021-03-01 ENCOUNTER — CLINICAL SUPPORT (OUTPATIENT)
Dept: SMOKING CESSATION | Facility: CLINIC | Age: 31
End: 2021-03-01

## 2021-03-01 DIAGNOSIS — F17.200 NICOTINE DEPENDENCE: Primary | ICD-10-CM

## 2021-03-01 PROCEDURE — 99999 PR PBB SHADOW E&M-EST. PATIENT-LVL I: ICD-10-PCS | Mod: PBBFAC,,,

## 2021-03-01 PROCEDURE — 99999 PR PBB SHADOW E&M-EST. PATIENT-LVL I: CPT | Mod: PBBFAC,,,

## 2021-03-01 PROCEDURE — 90853 PR GROUP PSYCHOTHERAPY: ICD-10-PCS | Mod: S$GLB,,,

## 2021-03-01 PROCEDURE — 90853 GROUP PSYCHOTHERAPY: CPT | Mod: S$GLB,,,

## 2021-03-15 ENCOUNTER — TELEPHONE (OUTPATIENT)
Dept: SMOKING CESSATION | Facility: CLINIC | Age: 31
End: 2021-03-15

## 2021-03-16 ENCOUNTER — CLINICAL SUPPORT (OUTPATIENT)
Dept: SMOKING CESSATION | Facility: CLINIC | Age: 31
End: 2021-03-16

## 2021-03-16 DIAGNOSIS — F17.200 NICOTINE DEPENDENCE: Primary | ICD-10-CM

## 2021-03-16 PROCEDURE — 99999 PR PBB SHADOW E&M-EST. PATIENT-LVL I: ICD-10-PCS | Mod: PBBFAC,,,

## 2021-03-16 PROCEDURE — 99402 PREV MED CNSL INDIV APPRX 30: CPT | Mod: S$GLB,,,

## 2021-03-16 PROCEDURE — 99999 PR PBB SHADOW E&M-EST. PATIENT-LVL I: CPT | Mod: PBBFAC,,,

## 2021-03-16 PROCEDURE — 99402 PR PREVENT COUNSEL,INDIV,30 MIN: ICD-10-PCS | Mod: S$GLB,,,

## 2021-03-29 ENCOUNTER — TELEPHONE (OUTPATIENT)
Dept: SMOKING CESSATION | Facility: CLINIC | Age: 31
End: 2021-03-29

## 2021-04-05 ENCOUNTER — TELEPHONE (OUTPATIENT)
Dept: SMOKING CESSATION | Facility: CLINIC | Age: 31
End: 2021-04-05

## 2021-04-12 ENCOUNTER — TELEPHONE (OUTPATIENT)
Dept: SMOKING CESSATION | Facility: CLINIC | Age: 31
End: 2021-04-12

## 2021-05-03 ENCOUNTER — OFFICE VISIT (OUTPATIENT)
Dept: GASTROENTEROLOGY | Facility: CLINIC | Age: 31
End: 2021-05-03
Payer: MEDICAID

## 2021-05-03 VITALS
HEART RATE: 79 BPM | RESPIRATION RATE: 16 BRPM | SYSTOLIC BLOOD PRESSURE: 118 MMHG | WEIGHT: 221.81 LBS | OXYGEN SATURATION: 98 % | DIASTOLIC BLOOD PRESSURE: 60 MMHG | BODY MASS INDEX: 40.82 KG/M2 | HEIGHT: 62 IN

## 2021-05-03 DIAGNOSIS — K62.89 RECTAL PAIN: ICD-10-CM

## 2021-05-03 DIAGNOSIS — R10.84 GENERALIZED ABDOMINAL PAIN: Primary | ICD-10-CM

## 2021-05-03 PROBLEM — Z74.09 IMPAIRED FUNCTIONAL MOBILITY, BALANCE, GAIT, AND ENDURANCE: Status: RESOLVED | Noted: 2020-07-22 | Resolved: 2021-05-03

## 2021-05-03 PROBLEM — F31.60 BIPOLAR 1 DISORDER, MIXED: Status: ACTIVE | Noted: 2018-03-23

## 2021-05-03 PROBLEM — L73.2 AXILLARY HIDRADENITIS SUPPURATIVA: Status: ACTIVE | Noted: 2019-12-10

## 2021-05-03 PROBLEM — E28.2 PCOS (POLYCYSTIC OVARIAN SYNDROME): Status: ACTIVE | Noted: 2018-05-24

## 2021-05-03 PROBLEM — E11.9 TYPE 2 DIABETES MELLITUS: Status: ACTIVE | Noted: 2020-07-21

## 2021-05-03 PROBLEM — F41.1 GENERALIZED ANXIETY DISORDER: Status: ACTIVE | Noted: 2019-12-18

## 2021-05-03 PROCEDURE — 99999 PR PBB SHADOW E&M-EST. PATIENT-LVL V: CPT | Mod: PBBFAC,,, | Performed by: NURSE PRACTITIONER

## 2021-05-03 PROCEDURE — 99203 PR OFFICE/OUTPT VISIT, NEW, LEVL III, 30-44 MIN: ICD-10-PCS | Mod: S$PBB,,, | Performed by: NURSE PRACTITIONER

## 2021-05-03 PROCEDURE — 99203 OFFICE O/P NEW LOW 30 MIN: CPT | Mod: S$PBB,,, | Performed by: NURSE PRACTITIONER

## 2021-05-03 PROCEDURE — 99999 PR PBB SHADOW E&M-EST. PATIENT-LVL V: ICD-10-PCS | Mod: PBBFAC,,, | Performed by: NURSE PRACTITIONER

## 2021-05-03 PROCEDURE — 99215 OFFICE O/P EST HI 40 MIN: CPT | Mod: PBBFAC,PO | Performed by: NURSE PRACTITIONER

## 2021-05-03 RX ORDER — ADALIMUMAB 40MG/0.4ML
40 KIT SUBCUTANEOUS
COMMUNITY
Start: 2021-04-12

## 2021-05-03 RX ORDER — DOXYCYCLINE 100 MG/1
100 TABLET ORAL DAILY
COMMUNITY
Start: 2021-04-22 | End: 2022-05-30

## 2021-05-03 RX ORDER — HYDROCORTISONE 25 MG/G
CREAM TOPICAL 2 TIMES DAILY
Qty: 20 G | Refills: 2 | Status: SHIPPED | OUTPATIENT
Start: 2021-05-03 | End: 2022-05-30

## 2021-05-03 RX ORDER — AMOXICILLIN 500 MG/1
500 CAPSULE ORAL 2 TIMES DAILY
COMMUNITY
Start: 2021-04-28 | End: 2022-05-30

## 2021-05-03 RX ORDER — METFORMIN HYDROCHLORIDE 500 MG/1
500 TABLET ORAL DAILY
COMMUNITY
Start: 2020-12-14

## 2021-05-03 RX ORDER — ATORVASTATIN CALCIUM 20 MG/1
20 TABLET, FILM COATED ORAL DAILY
COMMUNITY
Start: 2021-04-23 | End: 2023-10-04

## 2021-05-11 ENCOUNTER — TELEPHONE (OUTPATIENT)
Dept: OBSTETRICS AND GYNECOLOGY | Facility: CLINIC | Age: 31
End: 2021-05-11

## 2021-05-12 ENCOUNTER — OFFICE VISIT (OUTPATIENT)
Dept: OBSTETRICS AND GYNECOLOGY | Facility: CLINIC | Age: 31
End: 2021-05-12
Payer: MEDICAID

## 2021-05-12 ENCOUNTER — TELEPHONE (OUTPATIENT)
Dept: OBSTETRICS AND GYNECOLOGY | Facility: CLINIC | Age: 31
End: 2021-05-12

## 2021-05-12 VITALS
HEIGHT: 62 IN | WEIGHT: 224.13 LBS | SYSTOLIC BLOOD PRESSURE: 110 MMHG | BODY MASS INDEX: 41.24 KG/M2 | DIASTOLIC BLOOD PRESSURE: 72 MMHG

## 2021-05-12 DIAGNOSIS — Z11.3 SCREENING EXAMINATION FOR STD (SEXUALLY TRANSMITTED DISEASE): ICD-10-CM

## 2021-05-12 DIAGNOSIS — Z86.19 HISTORY OF HPV INFECTION: ICD-10-CM

## 2021-05-12 DIAGNOSIS — Z12.4 PAP SMEAR FOR CERVICAL CANCER SCREENING: ICD-10-CM

## 2021-05-12 DIAGNOSIS — Z01.419 ENCOUNTER FOR ANNUAL ROUTINE GYNECOLOGICAL EXAMINATION: Primary | ICD-10-CM

## 2021-05-12 DIAGNOSIS — R30.0 DYSURIA: ICD-10-CM

## 2021-05-12 PROCEDURE — 99395 PREV VISIT EST AGE 18-39: CPT | Mod: S$PBB,,, | Performed by: OBSTETRICS & GYNECOLOGY

## 2021-05-12 PROCEDURE — 88175 CYTOPATH C/V AUTO FLUID REDO: CPT | Performed by: OBSTETRICS & GYNECOLOGY

## 2021-05-12 PROCEDURE — 99999 PR PBB SHADOW E&M-EST. PATIENT-LVL III: CPT | Mod: PBBFAC,,, | Performed by: OBSTETRICS & GYNECOLOGY

## 2021-05-12 PROCEDURE — 99213 OFFICE O/P EST LOW 20 MIN: CPT | Mod: PBBFAC,PO | Performed by: OBSTETRICS & GYNECOLOGY

## 2021-05-12 PROCEDURE — 99999 PR PBB SHADOW E&M-EST. PATIENT-LVL III: ICD-10-PCS | Mod: PBBFAC,,, | Performed by: OBSTETRICS & GYNECOLOGY

## 2021-05-12 PROCEDURE — 87624 HPV HI-RISK TYP POOLED RSLT: CPT | Performed by: OBSTETRICS & GYNECOLOGY

## 2021-05-12 PROCEDURE — 87661 TRICHOMONAS VAGINALIS AMPLIF: CPT | Mod: 59 | Performed by: OBSTETRICS & GYNECOLOGY

## 2021-05-12 PROCEDURE — 99395 PR PREVENTIVE VISIT,EST,18-39: ICD-10-PCS | Mod: S$PBB,,, | Performed by: OBSTETRICS & GYNECOLOGY

## 2021-05-12 PROCEDURE — 87086 URINE CULTURE/COLONY COUNT: CPT | Performed by: OBSTETRICS & GYNECOLOGY

## 2021-05-12 PROCEDURE — 87481 CANDIDA DNA AMP PROBE: CPT | Mod: 59 | Performed by: OBSTETRICS & GYNECOLOGY

## 2021-05-13 ENCOUNTER — LAB VISIT (OUTPATIENT)
Dept: LAB | Facility: HOSPITAL | Age: 31
End: 2021-05-13
Attending: OBSTETRICS & GYNECOLOGY
Payer: MEDICAID

## 2021-05-13 ENCOUNTER — PATIENT MESSAGE (OUTPATIENT)
Dept: OBSTETRICS AND GYNECOLOGY | Facility: HOSPITAL | Age: 31
End: 2021-05-13

## 2021-05-13 ENCOUNTER — PATIENT MESSAGE (OUTPATIENT)
Dept: OBSTETRICS AND GYNECOLOGY | Facility: CLINIC | Age: 31
End: 2021-05-13

## 2021-05-13 DIAGNOSIS — Z11.3 SCREENING EXAMINATION FOR STD (SEXUALLY TRANSMITTED DISEASE): ICD-10-CM

## 2021-05-13 LAB
BACTERIAL VAGINOSIS DNA: NEGATIVE
C TRACH RRNA SPEC QL NAA+PROBE: NEGATIVE
CANDIDA GLABRATA DNA: NEGATIVE
CANDIDA KRUSEI DNA: NEGATIVE
CANDIDA RRNA VAG QL PROBE: NEGATIVE
N GONORRHOEA RRNA SPEC QL NAA+PROBE: NEGATIVE
T VAGINALIS RRNA GENITAL QL PROBE: POSITIVE

## 2021-05-13 PROCEDURE — 86592 SYPHILIS TEST NON-TREP QUAL: CPT | Performed by: OBSTETRICS & GYNECOLOGY

## 2021-05-13 PROCEDURE — 36415 COLL VENOUS BLD VENIPUNCTURE: CPT | Performed by: OBSTETRICS & GYNECOLOGY

## 2021-05-13 PROCEDURE — 86703 HIV-1/HIV-2 1 RESULT ANTBDY: CPT | Performed by: OBSTETRICS & GYNECOLOGY

## 2021-05-13 PROCEDURE — 80074 ACUTE HEPATITIS PANEL: CPT | Performed by: OBSTETRICS & GYNECOLOGY

## 2021-05-13 RX ORDER — METRONIDAZOLE 500 MG/1
2000 TABLET ORAL ONCE
Qty: 4 TABLET | Refills: 1 | Status: SHIPPED | OUTPATIENT
Start: 2021-05-13 | End: 2021-05-13

## 2021-05-14 ENCOUNTER — PATIENT MESSAGE (OUTPATIENT)
Dept: OBSTETRICS AND GYNECOLOGY | Facility: CLINIC | Age: 31
End: 2021-05-14

## 2021-05-15 LAB — BACTERIA UR CULT: NO GROWTH

## 2021-05-17 ENCOUNTER — PATIENT MESSAGE (OUTPATIENT)
Dept: OBSTETRICS AND GYNECOLOGY | Facility: CLINIC | Age: 31
End: 2021-05-17

## 2021-05-20 ENCOUNTER — PATIENT MESSAGE (OUTPATIENT)
Dept: OBSTETRICS AND GYNECOLOGY | Facility: CLINIC | Age: 31
End: 2021-05-20

## 2021-05-20 LAB
CLINICAL INFO: NORMAL
CYTO CVX: NORMAL
CYTOLOGIST CVX/VAG CYTO: NORMAL
CYTOLOGY CMNT CVX/VAG CYTO-IMP: NORMAL
CYTOLOGY PAP THIN PREP EXPLANATION: NORMAL
DATE OF PREVIOUS PAP: NORMAL
DATE PREVIOUS BX: YES
HPV I/H RISK 4 DNA CVX QL NAA+PROBE: NOT DETECTED
LMP START DATE: NORMAL
MICROORGANISM CVX/VAG CYTO: NORMAL
SPECIMEN SOURCE CVX/VAG CYTO: NORMAL
STAT OF ADQ CVX/VAG CYTO-IMP: NORMAL

## 2021-07-22 ENCOUNTER — CLINICAL SUPPORT (OUTPATIENT)
Dept: SMOKING CESSATION | Facility: CLINIC | Age: 31
End: 2021-07-22

## 2021-07-22 DIAGNOSIS — F17.200 NICOTINE DEPENDENCE: Primary | ICD-10-CM

## 2021-07-22 PROCEDURE — 99407 BEHAV CHNG SMOKING > 10 MIN: CPT | Mod: S$GLB,,,

## 2021-07-22 PROCEDURE — 99407 PR TOBACCO USE CESSATION INTENSIVE >10 MINUTES: ICD-10-PCS | Mod: S$GLB,,,

## 2021-12-28 ENCOUNTER — TELEPHONE (OUTPATIENT)
Dept: OBSTETRICS AND GYNECOLOGY | Facility: CLINIC | Age: 31
End: 2021-12-28
Payer: MEDICAID

## 2021-12-28 NOTE — TELEPHONE ENCOUNTER
----- Message from Jennifer Doyle sent at 12/28/2021 10:44 AM CST -----  Contact: [phone]/ self  Who Called: pt   Regarding: requesting orders for  STI   Would the patient rather a call back or a response via [facility]? Call back  Best Call Back Number: [phone]  Additional Information:

## 2022-01-03 ENCOUNTER — PATIENT MESSAGE (OUTPATIENT)
Dept: PHARMACY | Facility: CLINIC | Age: 32
End: 2022-01-03
Payer: MEDICAID

## 2022-01-03 ENCOUNTER — OFFICE VISIT (OUTPATIENT)
Dept: OBSTETRICS AND GYNECOLOGY | Facility: CLINIC | Age: 32
End: 2022-01-03
Payer: MEDICAID

## 2022-01-03 ENCOUNTER — PATIENT MESSAGE (OUTPATIENT)
Dept: OBSTETRICS AND GYNECOLOGY | Facility: CLINIC | Age: 32
End: 2022-01-03

## 2022-01-03 ENCOUNTER — LAB VISIT (OUTPATIENT)
Dept: LAB | Facility: HOSPITAL | Age: 32
End: 2022-01-03
Attending: NURSE PRACTITIONER
Payer: MEDICAID

## 2022-01-03 VITALS — SYSTOLIC BLOOD PRESSURE: 122 MMHG | BODY MASS INDEX: 40.6 KG/M2 | WEIGHT: 222 LBS | DIASTOLIC BLOOD PRESSURE: 62 MMHG

## 2022-01-03 DIAGNOSIS — N63.0 BREAST LUMP: ICD-10-CM

## 2022-01-03 DIAGNOSIS — Z11.3 SCREEN FOR STD (SEXUALLY TRANSMITTED DISEASE): Primary | ICD-10-CM

## 2022-01-03 DIAGNOSIS — Z23 NEED FOR HPV VACCINATION: ICD-10-CM

## 2022-01-03 DIAGNOSIS — Z11.3 SCREEN FOR STD (SEXUALLY TRANSMITTED DISEASE): ICD-10-CM

## 2022-01-03 LAB
B-HCG UR QL: NEGATIVE
CTP QC/QA: YES

## 2022-01-03 PROCEDURE — 36415 COLL VENOUS BLD VENIPUNCTURE: CPT | Performed by: NURSE PRACTITIONER

## 2022-01-03 PROCEDURE — 99213 PR OFFICE/OUTPT VISIT, EST, LEVL III, 20-29 MIN: ICD-10-PCS | Mod: S$PBB,,, | Performed by: NURSE PRACTITIONER

## 2022-01-03 PROCEDURE — 3078F PR MOST RECENT DIASTOLIC BLOOD PRESSURE < 80 MM HG: ICD-10-PCS | Mod: CPTII,,, | Performed by: NURSE PRACTITIONER

## 2022-01-03 PROCEDURE — 99999 PR PBB SHADOW E&M-EST. PATIENT-LVL III: ICD-10-PCS | Mod: PBBFAC,,, | Performed by: NURSE PRACTITIONER

## 2022-01-03 PROCEDURE — 99999 PR PBB SHADOW E&M-EST. PATIENT-LVL III: CPT | Mod: PBBFAC,,, | Performed by: NURSE PRACTITIONER

## 2022-01-03 PROCEDURE — 87491 CHLMYD TRACH DNA AMP PROBE: CPT | Performed by: NURSE PRACTITIONER

## 2022-01-03 PROCEDURE — 3008F PR BODY MASS INDEX (BMI) DOCUMENTED: ICD-10-PCS | Mod: CPTII,,, | Performed by: NURSE PRACTITIONER

## 2022-01-03 PROCEDURE — 3008F BODY MASS INDEX DOCD: CPT | Mod: CPTII,,, | Performed by: NURSE PRACTITIONER

## 2022-01-03 PROCEDURE — 87481 CANDIDA DNA AMP PROBE: CPT | Mod: 59 | Performed by: NURSE PRACTITIONER

## 2022-01-03 PROCEDURE — 80074 ACUTE HEPATITIS PANEL: CPT | Performed by: NURSE PRACTITIONER

## 2022-01-03 PROCEDURE — 1160F PR REVIEW ALL MEDS BY PRESCRIBER/CLIN PHARMACIST DOCUMENTED: ICD-10-PCS | Mod: CPTII,,, | Performed by: NURSE PRACTITIONER

## 2022-01-03 PROCEDURE — 1159F PR MEDICATION LIST DOCUMENTED IN MEDICAL RECORD: ICD-10-PCS | Mod: CPTII,,, | Performed by: NURSE PRACTITIONER

## 2022-01-03 PROCEDURE — 1159F MED LIST DOCD IN RCRD: CPT | Mod: CPTII,,, | Performed by: NURSE PRACTITIONER

## 2022-01-03 PROCEDURE — 81025 URINE PREGNANCY TEST: CPT | Mod: PBBFAC,PO | Performed by: NURSE PRACTITIONER

## 2022-01-03 PROCEDURE — 87389 HIV-1 AG W/HIV-1&-2 AB AG IA: CPT | Performed by: NURSE PRACTITIONER

## 2022-01-03 PROCEDURE — 86592 SYPHILIS TEST NON-TREP QUAL: CPT | Performed by: NURSE PRACTITIONER

## 2022-01-03 PROCEDURE — 3074F SYST BP LT 130 MM HG: CPT | Mod: CPTII,,, | Performed by: NURSE PRACTITIONER

## 2022-01-03 PROCEDURE — 99213 OFFICE O/P EST LOW 20 MIN: CPT | Mod: S$PBB,,, | Performed by: NURSE PRACTITIONER

## 2022-01-03 PROCEDURE — 3074F PR MOST RECENT SYSTOLIC BLOOD PRESSURE < 130 MM HG: ICD-10-PCS | Mod: CPTII,,, | Performed by: NURSE PRACTITIONER

## 2022-01-03 PROCEDURE — 3078F DIAST BP <80 MM HG: CPT | Mod: CPTII,,, | Performed by: NURSE PRACTITIONER

## 2022-01-03 PROCEDURE — 99213 OFFICE O/P EST LOW 20 MIN: CPT | Mod: PBBFAC,PO | Performed by: NURSE PRACTITIONER

## 2022-01-03 PROCEDURE — 87591 N.GONORRHOEAE DNA AMP PROB: CPT | Mod: 59 | Performed by: NURSE PRACTITIONER

## 2022-01-03 PROCEDURE — 1160F RVW MEDS BY RX/DR IN RCRD: CPT | Mod: CPTII,,, | Performed by: NURSE PRACTITIONER

## 2022-01-03 RX ORDER — METRONIDAZOLE 500 MG/1
500 TABLET ORAL 2 TIMES DAILY
Qty: 14 TABLET | Refills: 0 | Status: SHIPPED | OUTPATIENT
Start: 2022-01-03 | End: 2022-01-06 | Stop reason: SDUPTHER

## 2022-01-03 NOTE — PROGRESS NOTES
CC: Screening for sexually transmitted infection    Jose Hernandez is a 31 y.o. female  presents for STD screening  Patient's last menstrual period was 12/15/2021..  Reports vaginal itching and irritation.  Denies any new rashes or lesions.  Denies pelvic or abdominal pain.   Denies abnormal or foul vaginal discharge.  Pt is also c/o right breast lump. She noticed a few weeks ago after a rough sexual encounter where she had skin brusing. She desire the HPV vaccine series. UPT is negative.       ROS:  GENERAL: Denies weight gain or weight loss. Feeling well overall.   SKIN: Denies rash or lesions.   HEAD: Denies head injury or headache.   NODES: Denies enlarged lymph nodes.   CHEST: Denies chest pain or shortness of breath.   CARDIOVASCULAR: Denies palpitations or left sided chest pain.   ABDOMEN: No abdominal pain, constipation, diarrhea, nausea, vomiting or rectal bleeding.   URINARY: No frequency, dysuria, hematuria, or burning on urination.  REPRODUCTIVE: See HPI.   BREASTS: The patient performs breast self-examination and denies pain, lumps, or nipple discharge.   HEMATOLOGIC: No easy bruisability or excessive bleeding.   MUSCULOSKELETAL: Denies joint pain or swelling.   NEUROLOGIC: Denies syncope or weakness.   PSYCHIATRIC: Denies depression, anxiety or mood swings.      PHYSICAL EXAM:    APPEARANCE: Well nourished, well developed, in no acute distress.  AFFECT: Alert and oriented x 3  SKIN: Warm, dry, & intact. No acne or hirsutism.  NECK: Neck symmetric  NODES: No inguinal or femoral lymph node enlargement  CHEST:  Easy, even breaths.  BREASTS: Symmetrical, no skin changes or visible lesions. + right breast palpable mass at 3 0'clock, nipple discharge or adenopathy bilaterally.  ABDOMEN: Soft.  Nontender, nondistended.  PELVIC: Normal external genitalia without lesions.  Normal hair distribution.  Adequate perineal body, normal urethral meatus.    Vagina moist and well rugated without lesions  or discharge.  Cervix pink, without lesions, discharge or tenderness.  No significant cystocele or rectocele.    Bimanual exam shows uterus to be normal size, regular, mobile and nontender.  Adnexa without masses or tenderness.    EXTREMITIES: No edema.    Screen for STD (sexually transmitted disease)  -     C. trachomatis/N. gonorrhoeae by AMP DNA  -     HIV-1 and HIV-2 antibodies; Future; Expected date: 01/03/2022  -     RPR; Future; Expected date: 01/03/2022  -     Vaginosis Screen by DNA Probe  -     Hepatitis panel, acute; Future; Expected date: 01/03/2022    Breast lump  -     US Breast Right Complete; Future; Expected date: 01/03/2022    Need for HPV vaccination  -     POCT Urine Pregnancy  -     (In Office Administered) HPV Vaccine (9-Valent) (3 Dose) (IM)  -     metroNIDAZOLE (FLAGYL) 500 MG tablet; Take 1 tablet (500 mg total) by mouth 2 (two) times daily. for 7 days  Dispense: 14 tablet; Refill: 0       STD screening   Flagyl  Right breast US         Patient was counseled today on prevention of STDs with use of condoms.  We also reviewed A.C.S. Pap guidelines and recommendations for yearly pelvic exams, mammograms and monthly self breast exams; to see her PCP for other health maintenance.     Followup pending lab results    CLAUDIA Brown

## 2022-01-04 LAB
HAV IGM SERPL QL IA: NEGATIVE
HBV CORE IGM SERPL QL IA: NEGATIVE
HBV SURFACE AG SERPL QL IA: NEGATIVE
HCV AB SERPL QL IA: NEGATIVE
HIV 1+2 AB+HIV1 P24 AG SERPL QL IA: NEGATIVE
RPR SER QL: NORMAL

## 2022-01-05 ENCOUNTER — PATIENT MESSAGE (OUTPATIENT)
Dept: OBSTETRICS AND GYNECOLOGY | Facility: CLINIC | Age: 32
End: 2022-01-05
Payer: MEDICAID

## 2022-01-05 ENCOUNTER — HOSPITAL ENCOUNTER (OUTPATIENT)
Dept: RADIOLOGY | Facility: HOSPITAL | Age: 32
Discharge: HOME | End: 2022-01-05
Attending: NURSE PRACTITIONER
Payer: MEDICAID

## 2022-01-05 VITALS — BODY MASS INDEX: 40.6 KG/M2 | WEIGHT: 222 LBS

## 2022-01-05 DIAGNOSIS — Z23 NEED FOR HPV VACCINATION: ICD-10-CM

## 2022-01-05 DIAGNOSIS — N63.0 BREAST LUMP: ICD-10-CM

## 2022-01-05 PROCEDURE — 77062 MAMMO DIGITAL DIAGNOSTIC BILAT WITH TOMO: ICD-10-PCS | Mod: 26,,, | Performed by: RADIOLOGY

## 2022-01-05 PROCEDURE — 76642 US BREAST BILATERAL LIMITED: ICD-10-PCS | Mod: 26,50,, | Performed by: RADIOLOGY

## 2022-01-05 PROCEDURE — 76642 ULTRASOUND BREAST LIMITED: CPT | Mod: TC,50

## 2022-01-05 PROCEDURE — 77066 DX MAMMO INCL CAD BI: CPT | Mod: TC

## 2022-01-05 PROCEDURE — 77066 MAMMO DIGITAL DIAGNOSTIC BILAT WITH TOMO: ICD-10-PCS | Mod: 26,,, | Performed by: RADIOLOGY

## 2022-01-05 PROCEDURE — 77066 DX MAMMO INCL CAD BI: CPT | Mod: 26,,, | Performed by: RADIOLOGY

## 2022-01-05 PROCEDURE — 77062 BREAST TOMOSYNTHESIS BI: CPT | Mod: 26,,, | Performed by: RADIOLOGY

## 2022-01-05 PROCEDURE — 76642 ULTRASOUND BREAST LIMITED: CPT | Mod: 26,50,, | Performed by: RADIOLOGY

## 2022-01-06 ENCOUNTER — TELEPHONE (OUTPATIENT)
Dept: OBSTETRICS AND GYNECOLOGY | Facility: CLINIC | Age: 32
End: 2022-01-06
Payer: MEDICAID

## 2022-01-06 ENCOUNTER — PATIENT MESSAGE (OUTPATIENT)
Dept: OBSTETRICS AND GYNECOLOGY | Facility: CLINIC | Age: 32
End: 2022-01-06
Payer: MEDICAID

## 2022-01-06 ENCOUNTER — CLINICAL SUPPORT (OUTPATIENT)
Dept: SMOKING CESSATION | Facility: CLINIC | Age: 32
End: 2022-01-06

## 2022-01-06 DIAGNOSIS — F17.200 NICOTINE DEPENDENCE: Primary | ICD-10-CM

## 2022-01-06 PROCEDURE — 99406 PR TOBACCO USE CESSATION INTERMEDIATE 3-10 MINUTES: ICD-10-PCS | Mod: S$GLB,,,

## 2022-01-06 PROCEDURE — 99406 BEHAV CHNG SMOKING 3-10 MIN: CPT | Mod: S$GLB,,,

## 2022-01-06 RX ORDER — METRONIDAZOLE 500 MG/1
TABLET ORAL
Qty: 4 TABLET | Refills: 1 | Status: SHIPPED | OUTPATIENT
Start: 2022-01-06 | End: 2022-01-28 | Stop reason: SDUPTHER

## 2022-01-06 RX ORDER — METRONIDAZOLE 500 MG/1
500 TABLET ORAL 2 TIMES DAILY
Qty: 14 TABLET | Refills: 0 | Status: SHIPPED | OUTPATIENT
Start: 2022-01-06 | End: 2022-01-06 | Stop reason: CLARIF

## 2022-01-06 NOTE — TELEPHONE ENCOUNTER
Gave verbal to have flagyl filled here instead of [facility]. Per pt request. Our pharmacy verbalized understanding.

## 2022-01-06 NOTE — PROGRESS NOTES
Spoke with patient today in regard to smoking cessation progress for 6 month telephone follow up, she states not tobacco free. Patient states not interested in returning to the program at this time.  Informed patient of benefit period, future follow up, and contact information if any further help or support is needed. Will complete smart form for 12 month follow up and resolve Quit attempt #1.

## 2022-01-07 LAB
BACTERIAL VAGINOSIS DNA: POSITIVE
CANDIDA GLABRATA DNA: NEGATIVE
CANDIDA KRUSEI DNA: NEGATIVE
CANDIDA RRNA VAG QL PROBE: NEGATIVE
T VAGINALIS RRNA GENITAL QL PROBE: POSITIVE

## 2022-01-10 ENCOUNTER — PATIENT MESSAGE (OUTPATIENT)
Dept: OBSTETRICS AND GYNECOLOGY | Facility: CLINIC | Age: 32
End: 2022-01-10
Payer: MEDICAID

## 2022-01-11 LAB
C TRACH DNA SPEC QL NAA+PROBE: NOT DETECTED
N GONORRHOEA DNA SPEC QL NAA+PROBE: NOT DETECTED

## 2022-01-21 ENCOUNTER — TELEPHONE (OUTPATIENT)
Dept: OBSTETRICS AND GYNECOLOGY | Facility: CLINIC | Age: 32
End: 2022-01-21
Payer: MEDICAID

## 2022-01-24 ENCOUNTER — OFFICE VISIT (OUTPATIENT)
Dept: OBSTETRICS AND GYNECOLOGY | Facility: CLINIC | Age: 32
End: 2022-01-24
Payer: MEDICAID

## 2022-01-24 VITALS
SYSTOLIC BLOOD PRESSURE: 118 MMHG | BODY MASS INDEX: 40.08 KG/M2 | WEIGHT: 219.13 LBS | DIASTOLIC BLOOD PRESSURE: 68 MMHG

## 2022-01-24 DIAGNOSIS — Z11.3 SCREEN FOR STD (SEXUALLY TRANSMITTED DISEASE): Primary | ICD-10-CM

## 2022-01-24 DIAGNOSIS — L73.2 HIDRADENITIS SUPPURATIVA: ICD-10-CM

## 2022-01-24 DIAGNOSIS — Z86.19 HISTORY OF TRICHOMONIASIS: ICD-10-CM

## 2022-01-24 PROCEDURE — 3074F PR MOST RECENT SYSTOLIC BLOOD PRESSURE < 130 MM HG: ICD-10-PCS | Mod: CPTII,,, | Performed by: NURSE PRACTITIONER

## 2022-01-24 PROCEDURE — 3078F PR MOST RECENT DIASTOLIC BLOOD PRESSURE < 80 MM HG: ICD-10-PCS | Mod: CPTII,,, | Performed by: NURSE PRACTITIONER

## 2022-01-24 PROCEDURE — 99999 PR PBB SHADOW E&M-EST. PATIENT-LVL III: ICD-10-PCS | Mod: PBBFAC,,, | Performed by: NURSE PRACTITIONER

## 2022-01-24 PROCEDURE — 3008F PR BODY MASS INDEX (BMI) DOCUMENTED: ICD-10-PCS | Mod: CPTII,,, | Performed by: NURSE PRACTITIONER

## 2022-01-24 PROCEDURE — 3078F DIAST BP <80 MM HG: CPT | Mod: CPTII,,, | Performed by: NURSE PRACTITIONER

## 2022-01-24 PROCEDURE — 99213 PR OFFICE/OUTPT VISIT, EST, LEVL III, 20-29 MIN: ICD-10-PCS | Mod: S$PBB,,, | Performed by: NURSE PRACTITIONER

## 2022-01-24 PROCEDURE — 99213 OFFICE O/P EST LOW 20 MIN: CPT | Mod: PBBFAC,PO | Performed by: NURSE PRACTITIONER

## 2022-01-24 PROCEDURE — 3008F BODY MASS INDEX DOCD: CPT | Mod: CPTII,,, | Performed by: NURSE PRACTITIONER

## 2022-01-24 PROCEDURE — 1159F PR MEDICATION LIST DOCUMENTED IN MEDICAL RECORD: ICD-10-PCS | Mod: CPTII,,, | Performed by: NURSE PRACTITIONER

## 2022-01-24 PROCEDURE — 87481 CANDIDA DNA AMP PROBE: CPT | Mod: 59 | Performed by: NURSE PRACTITIONER

## 2022-01-24 PROCEDURE — 3074F SYST BP LT 130 MM HG: CPT | Mod: CPTII,,, | Performed by: NURSE PRACTITIONER

## 2022-01-24 PROCEDURE — 99999 PR PBB SHADOW E&M-EST. PATIENT-LVL III: CPT | Mod: PBBFAC,,, | Performed by: NURSE PRACTITIONER

## 2022-01-24 PROCEDURE — 1159F MED LIST DOCD IN RCRD: CPT | Mod: CPTII,,, | Performed by: NURSE PRACTITIONER

## 2022-01-24 PROCEDURE — 99213 OFFICE O/P EST LOW 20 MIN: CPT | Mod: S$PBB,,, | Performed by: NURSE PRACTITIONER

## 2022-01-24 NOTE — PROGRESS NOTES
CC: Test of cure trichomoniasis    HPI: Pt is a 31 y.o.  female who presents for test of cure for trichomoniasis.  She was + for trich on 1/3.  Reports she and partner were both treated and abstained from sex for 7 days after the treatment.  Denies any vulvovaginal itching, irritation, abdominal pain or abnormal discharge.  She with HS and currently has a painful boil  to groin. Had appt with derm scheduled for today for I&D. She is running late for appt and wants to do the I&D today.         ROS:  GENERAL: Feeling well overall. Denies fever or chills.   SKIN: Denies rash or lesions.   HEAD: Denies head injury or headache.   NODES: Denies enlarged lymph nodes.   CHEST: Denies chest pain or shortness of breath.   CARDIOVASCULAR: Denies palpitations or left sided chest pain.   ABDOMEN: No abdominal pain, constipation, diarrhea, nausea, vomiting or rectal bleeding.   URINARY: No dysuria, hematuria, or burning on urination.  REPRODUCTIVE: See HPI.   BREASTS: Denies pain, lumps, or nipple discharge.   HEMATOLOGIC: No easy bruisability or excessive bleeding.   MUSCULOSKELETAL: Denies joint pain or swelling.   NEUROLOGIC: Denies syncope or weakness.   PSYCHIATRIC: Denies depression, anxiety or mood swings.    PE:   APPEARANCE: Well nourished, well developed, Black or  female in no acute distress.  VULVA: No lesions. Normal external female genitalia. + 1.5 cm tender raised area to left labia majora  URETHRAL MEATUS: Normal size and location, no lesions, no prolapse.  URETHRA: No masses, tenderness, or prolapse.  VAGINA: Moist. No lesions or lacerations noted. No abnormal discharge present. No odor present.   CERVIX: No lesions or discharge. No cervical motion tenderness.   UTERUS: Normal size, regular shape, mobile, non-tender.  ADNEXA: No tenderness. No fullness or masses palpated in the adnexal regions.   ANUS PERINEUM: Normal.      Diagnosis:  1. Screen for STD (sexually transmitted disease)    2.  History of trichomoniasis    3. Hidradenitis suppurativa        Plan:   Vaginosis cx  Discussed hot compresses BID to boil and to follow up with derm for drainage  Discussed I am not checked off to do deep I&D in clinic   Orders Placed This Encounter    Vaginosis Screen by DNA Probe       Patient was counseled today on prevention of STDs with use of condoms.  We also reviewed A.C.S. Pap guidelines and recommendations for yearly pelvic exams, mammograms and monthly self breast exams; to see her PCP for other health maintenance.     Followup pending lab results    CLAUDIA Brown

## 2022-01-27 ENCOUNTER — PATIENT MESSAGE (OUTPATIENT)
Dept: OBSTETRICS AND GYNECOLOGY | Facility: CLINIC | Age: 32
End: 2022-01-27
Payer: MEDICAID

## 2022-01-27 DIAGNOSIS — B37.31 VAGINAL YEAST INFECTION: ICD-10-CM

## 2022-01-27 DIAGNOSIS — N76.0 BV (BACTERIAL VAGINOSIS): Primary | ICD-10-CM

## 2022-01-27 DIAGNOSIS — B96.89 BV (BACTERIAL VAGINOSIS): Primary | ICD-10-CM

## 2022-01-27 LAB
BACTERIAL VAGINOSIS DNA: POSITIVE
CANDIDA GLABRATA DNA: NEGATIVE
CANDIDA KRUSEI DNA: NEGATIVE
CANDIDA RRNA VAG QL PROBE: POSITIVE
T VAGINALIS RRNA GENITAL QL PROBE: NEGATIVE

## 2022-01-27 RX ORDER — METRONIDAZOLE 7.5 MG/G
1 GEL VAGINAL DAILY
Qty: 70 G | Refills: 0 | Status: SHIPPED | OUTPATIENT
Start: 2022-01-27 | End: 2022-02-01

## 2022-01-27 RX ORDER — FLUCONAZOLE 150 MG/1
150 TABLET ORAL ONCE
Qty: 2 TABLET | Refills: 1 | Status: SHIPPED | OUTPATIENT
Start: 2022-01-27 | End: 2022-01-27

## 2022-01-28 RX ORDER — METRONIDAZOLE 500 MG/1
500 TABLET ORAL 2 TIMES DAILY
Qty: 14 TABLET | Refills: 0 | Status: SHIPPED | OUTPATIENT
Start: 2022-01-28 | End: 2022-02-04

## 2022-02-01 ENCOUNTER — PATIENT MESSAGE (OUTPATIENT)
Dept: OBSTETRICS AND GYNECOLOGY | Facility: CLINIC | Age: 32
End: 2022-02-01
Payer: MEDICAID

## 2022-05-17 ENCOUNTER — TELEPHONE (OUTPATIENT)
Dept: OBSTETRICS AND GYNECOLOGY | Facility: CLINIC | Age: 32
End: 2022-05-17

## 2022-05-17 NOTE — TELEPHONE ENCOUNTER
----- Message from Karen Gurrola sent at 5/17/2022 12:35 PM CDT -----  Regarding: Appt Access  Type:  Sooner Apoointment Request    Caller is requesting a sooner appointment.  Caller declined first available appointment listed below.  Caller will not accept being placed on the waitlist and is requesting a message be sent to doctor.  Name of Caller: pt  When is the first available appointment? 07-29-22  Symptoms: annual pap and STD screening  Would the patient rather a call back or a response via [facility]? call  Best Call Back Number: [phone]  Additional Information: pt will be out of town for the summer

## 2022-05-30 ENCOUNTER — LAB VISIT (OUTPATIENT)
Dept: LAB | Facility: HOSPITAL | Age: 32
End: 2022-05-30
Attending: NURSE PRACTITIONER
Payer: MEDICAID

## 2022-05-30 ENCOUNTER — OFFICE VISIT (OUTPATIENT)
Dept: OBSTETRICS AND GYNECOLOGY | Facility: CLINIC | Age: 32
End: 2022-05-30
Payer: MEDICAID

## 2022-05-30 VITALS
SYSTOLIC BLOOD PRESSURE: 116 MMHG | BODY MASS INDEX: 40.56 KG/M2 | WEIGHT: 221.81 LBS | DIASTOLIC BLOOD PRESSURE: 82 MMHG

## 2022-05-30 DIAGNOSIS — Z11.3 SCREENING EXAMINATION FOR SEXUALLY TRANSMITTED DISEASE: ICD-10-CM

## 2022-05-30 DIAGNOSIS — Z12.4 ENCOUNTER FOR PAPANICOLAOU SMEAR FOR CERVICAL CANCER SCREENING: ICD-10-CM

## 2022-05-30 DIAGNOSIS — Z01.419 WELL WOMAN EXAM: Primary | ICD-10-CM

## 2022-05-30 PROCEDURE — 99213 OFFICE O/P EST LOW 20 MIN: CPT | Mod: PBBFAC,PO | Performed by: NURSE PRACTITIONER

## 2022-05-30 PROCEDURE — 1159F PR MEDICATION LIST DOCUMENTED IN MEDICAL RECORD: ICD-10-PCS | Mod: CPTII,,, | Performed by: NURSE PRACTITIONER

## 2022-05-30 PROCEDURE — 3079F DIAST BP 80-89 MM HG: CPT | Mod: CPTII,,, | Performed by: NURSE PRACTITIONER

## 2022-05-30 PROCEDURE — 99999 PR PBB SHADOW E&M-EST. PATIENT-LVL III: CPT | Mod: PBBFAC,,, | Performed by: NURSE PRACTITIONER

## 2022-05-30 PROCEDURE — 99395 PREV VISIT EST AGE 18-39: CPT | Mod: S$PBB,,, | Performed by: NURSE PRACTITIONER

## 2022-05-30 PROCEDURE — 1159F MED LIST DOCD IN RCRD: CPT | Mod: CPTII,,, | Performed by: NURSE PRACTITIONER

## 2022-05-30 PROCEDURE — 87491 CHLMYD TRACH DNA AMP PROBE: CPT | Performed by: NURSE PRACTITIONER

## 2022-05-30 PROCEDURE — 36415 COLL VENOUS BLD VENIPUNCTURE: CPT | Performed by: NURSE PRACTITIONER

## 2022-05-30 PROCEDURE — 87389 HIV-1 AG W/HIV-1&-2 AB AG IA: CPT | Performed by: NURSE PRACTITIONER

## 2022-05-30 PROCEDURE — 80074 ACUTE HEPATITIS PANEL: CPT | Performed by: NURSE PRACTITIONER

## 2022-05-30 PROCEDURE — 3008F PR BODY MASS INDEX (BMI) DOCUMENTED: ICD-10-PCS | Mod: CPTII,,, | Performed by: NURSE PRACTITIONER

## 2022-05-30 PROCEDURE — 3074F SYST BP LT 130 MM HG: CPT | Mod: CPTII,,, | Performed by: NURSE PRACTITIONER

## 2022-05-30 PROCEDURE — 88175 CYTOPATH C/V AUTO FLUID REDO: CPT | Performed by: NURSE PRACTITIONER

## 2022-05-30 PROCEDURE — 87591 N.GONORRHOEAE DNA AMP PROB: CPT | Performed by: NURSE PRACTITIONER

## 2022-05-30 PROCEDURE — 1160F PR REVIEW ALL MEDS BY PRESCRIBER/CLIN PHARMACIST DOCUMENTED: ICD-10-PCS | Mod: CPTII,,, | Performed by: NURSE PRACTITIONER

## 2022-05-30 PROCEDURE — 87624 HPV HI-RISK TYP POOLED RSLT: CPT | Performed by: NURSE PRACTITIONER

## 2022-05-30 PROCEDURE — 3074F PR MOST RECENT SYSTOLIC BLOOD PRESSURE < 130 MM HG: ICD-10-PCS | Mod: CPTII,,, | Performed by: NURSE PRACTITIONER

## 2022-05-30 PROCEDURE — 86592 SYPHILIS TEST NON-TREP QUAL: CPT | Performed by: NURSE PRACTITIONER

## 2022-05-30 PROCEDURE — 3079F PR MOST RECENT DIASTOLIC BLOOD PRESSURE 80-89 MM HG: ICD-10-PCS | Mod: CPTII,,, | Performed by: NURSE PRACTITIONER

## 2022-05-30 PROCEDURE — 99999 PR PBB SHADOW E&M-EST. PATIENT-LVL III: ICD-10-PCS | Mod: PBBFAC,,, | Performed by: NURSE PRACTITIONER

## 2022-05-30 PROCEDURE — 3008F BODY MASS INDEX DOCD: CPT | Mod: CPTII,,, | Performed by: NURSE PRACTITIONER

## 2022-05-30 PROCEDURE — 1160F RVW MEDS BY RX/DR IN RCRD: CPT | Mod: CPTII,,, | Performed by: NURSE PRACTITIONER

## 2022-05-30 PROCEDURE — 99395 PR PREVENTIVE VISIT,EST,18-39: ICD-10-PCS | Mod: S$PBB,,, | Performed by: NURSE PRACTITIONER

## 2022-05-30 NOTE — PROGRESS NOTES
CC: Annual    HPI: Pt is a 31 y.o. female who presents for routine annual exam. Denies any GYN complaints.    PAP: 2021= Negative, HPV= Negative. 2020 PAP= Negative HPV=Positive    Menstrual cycle: monthly, duration= 7 days, not heavy, no pain  Contraception: declines  STD screening- would like vaginal and blood testing   Exercise: 2x week, group classes  Smoking history: Yes  Wears seatbelts    Denies domestic violence     Past Medical History:   Diagnosis Date    Diabetes mellitus, type 2          /82   Wt 100.6 kg (221 lb 12.5 oz)   LMP 2022   BMI 40.56 kg/m²   History reviewed. No pertinent surgical history.    OB History    Para Term  AB Living   2 2 1 1   2   SAB IAB Ectopic Multiple Live Births                  # Outcome Date GA Lbr Kev/2nd Weight Sex Delivery Anes PTL Lv   2       CS-Unspec      1 Term      CS-Unspec          Family History   Problem Relation Age of Onset    Breast cancer Paternal Aunt     Colon cancer Neg Hx     Ovarian cancer Neg Hx        Social History     Tobacco Use    Smoking status: Current Every Day Smoker     Packs/day: 1.00     Years: 18.00     Pack years: 18.00     Types: Cigarettes    Smokeless tobacco: Never Used    Tobacco comment: 21 in smoking program    Substance Use Topics    Alcohol use: Yes     Comment: occasionally    Drug use: Never       /82   Wt 100.6 kg (221 lb 12.5 oz)   LMP 2022   BMI 40.56 kg/m²       ROS:  GENERAL: Feeling well overall. Denies fever or chills.   SKIN: Denies rash or lesions.   HEAD: Denies head injury or headache.   NODES: Denies enlarged lymph nodes.   CHEST: Denies chest pain or shortness of breath.   CARDIOVASCULAR: Denies palpitations or left sided chest pain.   ABDOMEN: No abdominal pain, constipation, diarrhea, nausea, vomiting or rectal bleeding.   URINARY: No dysuria, hematuria, or burning on urination.  REPRODUCTIVE: See HPI.   BREASTS: Denies pain, lumps, or nipple  discharge.   HEMATOLOGIC: No easy bruisability or excessive bleeding.   MUSCULOSKELETAL: Denies joint pain or swelling.   NEUROLOGIC: Denies syncope or weakness.   PSYCHIATRIC: Denies depression, anxiety or mood swings.    PE:   APPEARANCE: Well nourished, well developed, in no acute distress.  AFFECT: WNL, alert and oriented x 3  SKIN: No acne or hirsutism  NECK: Neck symmetric  NODES: No inguinal, cervical, axillary, or femoral lymph node enlargement  CHEST: Good respiratory effect  ABDOMEN: limited due to body habitus Soft.  No tenderness or masses. Nondistended.  BREASTS: Symmetrical, no skin changes or visible lesions.  No palpable masses, nipple discharge bilaterally.  PELVIC: Normal external genitalia without lesions.  Normal hair distribution.  Adequate perineal body, normal urethral meatus.  Vagina moist and well rugated without lesions or discharge.  Cervix pink, without lesions, discharge or tenderness.  No significant cystocele or rectocele.  Bimanual exam limited due to body habitus shows uterus to be normal size, regular, mobile and nontender.  Adnexa without masses or tenderness.    Anus Perineum:No lesions, no relaxation, no external hemorrhoids.  EXTREMITIES: No edema.      ASSESSMENT AND PLAN  1. Well woman exam  HPV High Risk Genotypes, PCR    Liquid-Based Pap Smear, Screening   2. Encounter for Papanicolaou smear for cervical cancer screening  HPV High Risk Genotypes, PCR    Liquid-Based Pap Smear, Screening   3. Screening examination for sexually transmitted disease  C. trachomatis/N. gonorrhoeae by AMP DNA    HIV 1/2 Ag/Ab (4th Gen)    RPR    Hepatitis Panel, Acute       Patient was counseled today on A.C.S. Pap guidelines and recommendations for yearly pelvic exams, mammograms and monthly self breast exams; to see her PCP for other health maintenance.     All questions answered, patient verbalizes understanding.     Follow-up with in 1 year for routine exam and PRN.

## 2022-05-31 LAB
C TRACH DNA SPEC QL NAA+PROBE: NOT DETECTED
HAV IGM SERPL QL IA: NEGATIVE
HBV CORE IGM SERPL QL IA: NEGATIVE
HBV SURFACE AG SERPL QL IA: NEGATIVE
HCV AB SERPL QL IA: NEGATIVE
HIV 1+2 AB+HIV1 P24 AG SERPL QL IA: NEGATIVE
N GONORRHOEA DNA SPEC QL NAA+PROBE: NOT DETECTED
RPR SER QL: NORMAL

## 2022-06-02 LAB
FINAL PATHOLOGIC DIAGNOSIS: NORMAL
HPV HR 12 DNA SPEC QL NAA+PROBE: POSITIVE
HPV16 AG SPEC QL: NEGATIVE
HPV18 DNA SPEC QL NAA+PROBE: NEGATIVE
Lab: NORMAL

## 2022-11-08 ENCOUNTER — OFFICE VISIT (OUTPATIENT)
Dept: GASTROENTEROLOGY | Facility: CLINIC | Age: 32
End: 2022-11-08
Payer: MEDICAID

## 2022-11-08 VITALS
BODY MASS INDEX: 41.83 KG/M2 | DIASTOLIC BLOOD PRESSURE: 92 MMHG | HEIGHT: 62 IN | HEART RATE: 88 BPM | SYSTOLIC BLOOD PRESSURE: 127 MMHG | WEIGHT: 227.31 LBS

## 2022-11-08 DIAGNOSIS — E66.01 CLASS 3 SEVERE OBESITY DUE TO EXCESS CALORIES WITH SERIOUS COMORBIDITY AND BODY MASS INDEX (BMI) OF 40.0 TO 44.9 IN ADULT: ICD-10-CM

## 2022-11-08 DIAGNOSIS — R14.0 ABDOMINAL BLOATING: Primary | ICD-10-CM

## 2022-11-08 DIAGNOSIS — R19.8 IRREGULAR BOWEL HABITS: ICD-10-CM

## 2022-11-08 PROBLEM — E78.2 MIXED HYPERLIPIDEMIA: Status: ACTIVE | Noted: 2021-04-23

## 2022-11-08 PROCEDURE — 1159F MED LIST DOCD IN RCRD: CPT | Mod: CPTII,,, | Performed by: NURSE PRACTITIONER

## 2022-11-08 PROCEDURE — 99214 OFFICE O/P EST MOD 30 MIN: CPT | Mod: PBBFAC,PO | Performed by: NURSE PRACTITIONER

## 2022-11-08 PROCEDURE — 3080F DIAST BP >= 90 MM HG: CPT | Mod: CPTII,,, | Performed by: NURSE PRACTITIONER

## 2022-11-08 PROCEDURE — 3074F SYST BP LT 130 MM HG: CPT | Mod: CPTII,,, | Performed by: NURSE PRACTITIONER

## 2022-11-08 PROCEDURE — 99999 PR PBB SHADOW E&M-EST. PATIENT-LVL IV: CPT | Mod: PBBFAC,,, | Performed by: NURSE PRACTITIONER

## 2022-11-08 PROCEDURE — 3008F BODY MASS INDEX DOCD: CPT | Mod: CPTII,,, | Performed by: NURSE PRACTITIONER

## 2022-11-08 PROCEDURE — 3074F PR MOST RECENT SYSTOLIC BLOOD PRESSURE < 130 MM HG: ICD-10-PCS | Mod: CPTII,,, | Performed by: NURSE PRACTITIONER

## 2022-11-08 PROCEDURE — 3008F PR BODY MASS INDEX (BMI) DOCUMENTED: ICD-10-PCS | Mod: CPTII,,, | Performed by: NURSE PRACTITIONER

## 2022-11-08 PROCEDURE — 1159F PR MEDICATION LIST DOCUMENTED IN MEDICAL RECORD: ICD-10-PCS | Mod: CPTII,,, | Performed by: NURSE PRACTITIONER

## 2022-11-08 PROCEDURE — 99999 PR PBB SHADOW E&M-EST. PATIENT-LVL IV: ICD-10-PCS | Mod: PBBFAC,,, | Performed by: NURSE PRACTITIONER

## 2022-11-08 PROCEDURE — 1160F PR REVIEW ALL MEDS BY PRESCRIBER/CLIN PHARMACIST DOCUMENTED: ICD-10-PCS | Mod: CPTII,,, | Performed by: NURSE PRACTITIONER

## 2022-11-08 PROCEDURE — 99214 PR OFFICE/OUTPT VISIT, EST, LEVL IV, 30-39 MIN: ICD-10-PCS | Mod: S$PBB,,, | Performed by: NURSE PRACTITIONER

## 2022-11-08 PROCEDURE — 3080F PR MOST RECENT DIASTOLIC BLOOD PRESSURE >= 90 MM HG: ICD-10-PCS | Mod: CPTII,,, | Performed by: NURSE PRACTITIONER

## 2022-11-08 PROCEDURE — 1160F RVW MEDS BY RX/DR IN RCRD: CPT | Mod: CPTII,,, | Performed by: NURSE PRACTITIONER

## 2022-11-08 PROCEDURE — 99214 OFFICE O/P EST MOD 30 MIN: CPT | Mod: S$PBB,,, | Performed by: NURSE PRACTITIONER

## 2022-11-08 RX ORDER — OMEPRAZOLE 20 MG/1
20 CAPSULE, DELAYED RELEASE ORAL DAILY
COMMUNITY
Start: 2022-10-19 | End: 2023-10-04 | Stop reason: DRUGHIGH

## 2022-11-08 NOTE — PATIENT INSTRUCTIONS
I would like for you to buy a small box of dulcolax tablets and bottle of liquid Freeman's Milk of Magnesia (do not use the pills).  When you have time to stay home near the toilet take 2 Dulcolax tablets. Then in 1 hour drink 60 mL of milk of magnesia. Drink another 60 mL 2-3 hours later.     After that is complete, start an over the counter fiber supplement (such as Fiber gummy or Metamucil capsules once daily).

## 2022-11-08 NOTE — PROGRESS NOTES
"Subjective:       Patient ID: Jose Hernandez is a 31 y.o. female.    Chief Complaint: Abdominal Pain    30 y/o female with type 2 diabetes referred by PCP for dysphagia. Patient reports one episode of dysphagia a few weeks ago. Resolved with self-inducing vomiting. No recurrent dysphagia. Has irregular bowel pattern with bloating. No obvious food triggers. Feels symptoms are related to stress and grief. Has tried OTC antacids and probiotics with some symptom relief. Denies heartburn, hematemesis, weight loss, blood in stool.      Past Medical History:   Diagnosis Date    Diabetes mellitus, type 2        History reviewed. No pertinent surgical history.    Family History   Problem Relation Age of Onset    Breast cancer Paternal Aunt     Colon cancer Neg Hx     Ovarian cancer Neg Hx        Social History     Socioeconomic History    Marital status: Single   Tobacco Use    Smoking status: Every Day     Packs/day: 1.00     Years: 18.00     Pack years: 18.00     Types: Cigarettes    Smokeless tobacco: Never    Tobacco comments:     12/21/21 in smoking program    Substance and Sexual Activity    Alcohol use: Yes     Comment: occasionally    Drug use: Never    Sexual activity: Yes     Partners: Male     Comment: sometimes       Review of Systems   Constitutional:  Negative for appetite change and unexpected weight change.   HENT:  Negative for trouble swallowing.    Respiratory:  Negative for shortness of breath.    Cardiovascular:  Negative for chest pain.   Gastrointestinal:  Positive for abdominal pain, constipation and diarrhea.   Genitourinary:  Negative for dysuria.   Hematological:  Negative for adenopathy.   Psychiatric/Behavioral:  Negative for dysphoric mood.        Objective:     Vitals:    11/08/22 0917   BP: (!) 127/92   Pulse: 88   Weight: 103.1 kg (227 lb 4.8 oz)   Height: 5' 2" (1.575 m)          Physical Exam  Constitutional:       General: She is not in acute distress.     Appearance: Normal " appearance. She is not ill-appearing.   HENT:      Head: Normocephalic.   Eyes:      Conjunctiva/sclera: Conjunctivae normal.      Pupils: Pupils are equal, round, and reactive to light.   Pulmonary:      Effort: Pulmonary effort is normal. No respiratory distress.   Musculoskeletal:         General: Normal range of motion.      Cervical back: Normal range of motion.   Skin:     General: Skin is warm and dry.   Neurological:      Mental Status: She is alert and oriented to person, place, and time.   Psychiatric:         Mood and Affect: Mood normal.         Behavior: Behavior normal.             Assessment:         ICD-10-CM ICD-9-CM   1. Abdominal bloating  R14.0 787.3   2. Irregular bowel habits  R19.8 569.89   3. Class 3 severe obesity due to excess calories with serious comorbidity and body mass index (BMI) of 40.0 to 44.9 in adult  E66.01 278.01    Z68.41 V85.41       Plan:       Abdominal bloating  -     Ambulatory referral/consult to Gastroenterology    Irregular bowel habits    Class 3 severe obesity due to excess calories with serious comorbidity and body mass index (BMI) of 40.0 to 44.9 in adult    - MOM colon cleanse then start daily fiber supplement with probiotics. Weight loss advised. Dietary and exercise counseling done.    Follow up if symptoms worsen or fail to improve.     Patient's Medications   New Prescriptions    No medications on file   Previous Medications    ATORVASTATIN (LIPITOR) 20 MG TABLET    Take 20 mg by mouth once daily.    HUMIRA,CF, PEN 40 MG/0.4 ML PNKT    Inject 40 mg as directed every 7 days.    METFORMIN (GLUCOPHAGE) 500 MG TABLET    Take 500 mg by mouth once daily.    OMEPRAZOLE (PRILOSEC) 20 MG CAPSULE    Take 20 mg by mouth once daily.   Modified Medications    No medications on file   Discontinued Medications    No medications on file

## 2023-02-20 NOTE — PROGRESS NOTES
Please send patient pap smear letter or call. Her pap was negative and negative for the HPV virus. We will see her next year for her annual unless she has any issues. thanks.    oriented to person, place and time , normal sensation , short and long term memory intact

## 2023-05-23 ENCOUNTER — OFFICE VISIT (OUTPATIENT)
Dept: OBSTETRICS AND GYNECOLOGY | Facility: CLINIC | Age: 33
End: 2023-05-23
Payer: MEDICAID

## 2023-05-23 ENCOUNTER — LAB VISIT (OUTPATIENT)
Dept: LAB | Facility: HOSPITAL | Age: 33
End: 2023-05-23
Attending: OBSTETRICS & GYNECOLOGY
Payer: MEDICAID

## 2023-05-23 VITALS
BODY MASS INDEX: 42.74 KG/M2 | SYSTOLIC BLOOD PRESSURE: 124 MMHG | WEIGHT: 233.69 LBS | DIASTOLIC BLOOD PRESSURE: 80 MMHG

## 2023-05-23 DIAGNOSIS — Z11.3 SCREEN FOR STD (SEXUALLY TRANSMITTED DISEASE): Primary | ICD-10-CM

## 2023-05-23 DIAGNOSIS — E66.01 MORBID OBESITY: ICD-10-CM

## 2023-05-23 DIAGNOSIS — E28.2 PCOS (POLYCYSTIC OVARIAN SYNDROME): ICD-10-CM

## 2023-05-23 DIAGNOSIS — N93.9 ABNORMAL UTERINE BLEEDING (AUB): ICD-10-CM

## 2023-05-23 DIAGNOSIS — Z11.3 SCREEN FOR STD (SEXUALLY TRANSMITTED DISEASE): ICD-10-CM

## 2023-05-23 PROCEDURE — 1159F MED LIST DOCD IN RCRD: CPT | Mod: CPTII,,, | Performed by: OBSTETRICS & GYNECOLOGY

## 2023-05-23 PROCEDURE — 3008F PR BODY MASS INDEX (BMI) DOCUMENTED: ICD-10-PCS | Mod: CPTII,,, | Performed by: OBSTETRICS & GYNECOLOGY

## 2023-05-23 PROCEDURE — 80074 ACUTE HEPATITIS PANEL: CPT | Performed by: OBSTETRICS & GYNECOLOGY

## 2023-05-23 PROCEDURE — 88142 CYTOPATH C/V THIN LAYER: CPT | Performed by: PATHOLOGY

## 2023-05-23 PROCEDURE — 3008F BODY MASS INDEX DOCD: CPT | Mod: CPTII,,, | Performed by: OBSTETRICS & GYNECOLOGY

## 2023-05-23 PROCEDURE — 81514 NFCT DS BV&VAGINITIS DNA ALG: CPT | Performed by: OBSTETRICS & GYNECOLOGY

## 2023-05-23 PROCEDURE — 88141 CYTOPATH C/V INTERPRET: CPT | Mod: ,,, | Performed by: PATHOLOGY

## 2023-05-23 PROCEDURE — 99213 OFFICE O/P EST LOW 20 MIN: CPT | Mod: PBBFAC,PO | Performed by: OBSTETRICS & GYNECOLOGY

## 2023-05-23 PROCEDURE — 88141 PR  CYTOPATH CERV/VAG INTERPRET: ICD-10-PCS | Mod: ,,, | Performed by: PATHOLOGY

## 2023-05-23 PROCEDURE — 99213 PR OFFICE/OUTPT VISIT, EST, LEVL III, 20-29 MIN: ICD-10-PCS | Mod: S$PBB,,, | Performed by: OBSTETRICS & GYNECOLOGY

## 2023-05-23 PROCEDURE — 36415 COLL VENOUS BLD VENIPUNCTURE: CPT | Performed by: OBSTETRICS & GYNECOLOGY

## 2023-05-23 PROCEDURE — 99999 PR PBB SHADOW E&M-EST. PATIENT-LVL III: CPT | Mod: PBBFAC,,, | Performed by: OBSTETRICS & GYNECOLOGY

## 2023-05-23 PROCEDURE — 87389 HIV-1 AG W/HIV-1&-2 AB AG IA: CPT | Performed by: OBSTETRICS & GYNECOLOGY

## 2023-05-23 PROCEDURE — 99213 OFFICE O/P EST LOW 20 MIN: CPT | Mod: S$PBB,,, | Performed by: OBSTETRICS & GYNECOLOGY

## 2023-05-23 PROCEDURE — 3074F SYST BP LT 130 MM HG: CPT | Mod: CPTII,,, | Performed by: OBSTETRICS & GYNECOLOGY

## 2023-05-23 PROCEDURE — 87591 N.GONORRHOEAE DNA AMP PROB: CPT | Performed by: OBSTETRICS & GYNECOLOGY

## 2023-05-23 PROCEDURE — 3079F DIAST BP 80-89 MM HG: CPT | Mod: CPTII,,, | Performed by: OBSTETRICS & GYNECOLOGY

## 2023-05-23 PROCEDURE — 99999 PR PBB SHADOW E&M-EST. PATIENT-LVL III: ICD-10-PCS | Mod: PBBFAC,,, | Performed by: OBSTETRICS & GYNECOLOGY

## 2023-05-23 PROCEDURE — 3079F PR MOST RECENT DIASTOLIC BLOOD PRESSURE 80-89 MM HG: ICD-10-PCS | Mod: CPTII,,, | Performed by: OBSTETRICS & GYNECOLOGY

## 2023-05-23 PROCEDURE — 3074F PR MOST RECENT SYSTOLIC BLOOD PRESSURE < 130 MM HG: ICD-10-PCS | Mod: CPTII,,, | Performed by: OBSTETRICS & GYNECOLOGY

## 2023-05-23 PROCEDURE — 1159F PR MEDICATION LIST DOCUMENTED IN MEDICAL RECORD: ICD-10-PCS | Mod: CPTII,,, | Performed by: OBSTETRICS & GYNECOLOGY

## 2023-05-23 PROCEDURE — 86592 SYPHILIS TEST NON-TREP QUAL: CPT | Performed by: OBSTETRICS & GYNECOLOGY

## 2023-05-23 RX ORDER — MEDROXYPROGESTERONE ACETATE 10 MG/1
10 TABLET ORAL DAILY
Qty: 10 TABLET | Refills: 0 | Status: SHIPPED | OUTPATIENT
Start: 2023-05-23 | End: 2023-10-04

## 2023-05-23 NOTE — PROGRESS NOTES
GYNECOLOGY  :  Jose Hernandez is a 32 y.o.    Here today for  gyn evaluation    Desires  STD screening     No major complaints today , menstrual cycles are mostly regular but missed  last month   HX pf morbid obesity, non controlled diabetes Type 2 , and PCOS  Does not take the metformin as recommended  by PCP   Desires a Rx for Progesterone x 10 days, that was given a few year ago to make her bleed   Does not want to miss periods   Sexually active   No Birth control , declines OCP's recommended before     Last visit in  for STD and trichomonads     Normal menstrual cycles   No Hx Abnormal PAP smears  No Hx Abnormal Mammogram   Last gyn visit on:    Past Medical History:   Diagnosis Date    Diabetes mellitus, type 2      History reviewed. No pertinent surgical history.  Family History   Problem Relation Age of Onset    Breast cancer Paternal Aunt     Colon cancer Neg Hx     Ovarian cancer Neg Hx      Social History     Tobacco Use    Smoking status: Every Day     Packs/day: 1.00     Years: 18.00     Pack years: 18.00     Types: Cigarettes    Smokeless tobacco: Never    Tobacco comments:     19 in smoking program    Substance Use Topics    Alcohol use: Yes     Comment: occasionally    Drug use: Never     OB History    Para Term  AB Living   2 2 1 1   2   SAB IAB Ectopic Multiple Live Births                  # Outcome Date GA Lbr Kev/2nd Weight Sex Delivery Anes PTL Lv   2       CS-Unspec      1 Term      CS-Unspec          /80   Wt 106 kg (233 lb 11 oz)   LMP 2023   BMI 42.74 kg/m²     Last PAP=   LMP = 23  Last Mammogram = -  Last Colonoscopy  = -      COMPREHENSIVE GYN HISTORY:  G 2 P 2     PAP History: Denies abnormal Paps.  Infection History: Denies STDs. Denies PID.  Benign History: Denies uterine fibroids. Denies ovarian cysts. Denies endometriosis.   Cancer History: Denies cervical cancer. Denies uterine cancer or hyperplasia. Denies  ovarian cancer. Denies vulvar cancer or pre-cancer. Denies vaginal cancer or pre-cancer. Denies breast cancer. Denies colon cancer.  Sexual Activity History: ( x ) Yes   ( - )   no   Menstrual History: Age of menarche: (  12 )  years. Every (  30 )       days, flows for (  5 )   days. moderate  flow.  Dysmenorrhea History:  absent  Contraception:  -    ROS  GENERAL: Denies significant weight gain or weight loss. Feeling well overall.  SKIN: Denies rash or lesions.  Normal skin turgor  HEAD: Denies head injury or headache.   NODES: Denies enlarged lymph nodes.   CHEST: Denies chest pain or shortness of breath.   CARDIOVASCULAR: Denies palpitations or left sided chest pain.   ABDOMEN: No abdominal pain,no  diarrhea,constipation  nausea, vomiting or rectal bleeding.   URINARY: normal  Frequency,no  Dysuria or burning on urination.   REPRODUCTIVE: Per HPI   BREASTS: The patient sometimes performs breast self-examination and denies pain, lumps, or nipple discharge.   HEMATOLOGIC: No easy bruisability or excessive bleeding.   MUSCULOSKELETAL: Denies joint pain or swelling.   NEUROLOGIC: Denies syncope or weakness.   PSYCHIATRIC: Denies depression, anxiety or mood swings.    Physical Exam     Constitutional: She is oriented to person, place, and time. She appears well-developed and well-nourished. No distress.   HENT:   Head: Normocephalic.   NECK: Neck symmetric without masses or thyromegaly.  Cardiovascular: Normal rate and regular rhythm.   Pulmonary/Chest: Effort normal and breath sounds normal. No respiratory distress. She has no wheezes.   Breasts: Symmetrical, no skin changes or visible lesions. No palpable masses, nipple discharge or adenopathy bilaterally.  Abdominal: Soft not distended. Bowel sounds are normal. She exhibits   no masses . No tenderness to palpation.   Bimanual exam : difficult to evaluate pelvic organs due to body habitus. Urethra and bladder normal  Extremities normal no cyanosis ,edema.          A/P Jose Hernandez 32 y.o.     Dx=  1- Morbid obesity   2-DM type II   3-PCOS   4-STD screening   5-Skin infection       Procedures/Orders:  Patiens is counseled in detail about  PCOS , diabetes and morbid Obesity    .   General information given about contributing risk factors, natural history of the condition and possible consequences and complications  .  Treatment  /management options also discussed.   PAtient was given yesterday  recommendations asha Diabetes control  with  Diet and Metformin ( which is not taking)   Declines OCP's   Only wants progesterone tablets  for 10 days a month , to regulate menstrual cycle       Will follow  STD screening and PAP       Patient was counseled today on A.C.S. Pap guidelines and recommendations for yearly pelvic exams, mammograms and monthly self breast exams; to see her PCP for other health maintenance, nutrition and weight gain counseling, discussed lab values.  Discussed colonoscopy recommendations every 10 years starting at age 50   Calcium and vit D daily intake     F/u in 1 yr or PRN      I spent a total of 30 minutes on the day of the visit.This includes face to face time and non-face to face time preparing to see the patient (eg, review of tests), Obtaining and/or reviewing separately obtained history, Documenting clinical information in the electronic or other health record, Independently interpreting resultsand communicating results to the patient/family/caregiver, or Care coordination.      Gaurav Olvera M.D.   OB/GYN

## 2023-05-24 LAB
HAV IGM SERPL QL IA: NORMAL
HBV CORE IGM SERPL QL IA: NORMAL
HBV SURFACE AG SERPL QL IA: NORMAL
HCV AB SERPL QL IA: NORMAL
HIV 1+2 AB+HIV1 P24 AG SERPL QL IA: NORMAL
RPR SER QL: NORMAL

## 2023-05-25 LAB
BACTERIAL VAGINOSIS DNA: POSITIVE
C TRACH DNA SPEC QL NAA+PROBE: NOT DETECTED
CANDIDA GLABRATA DNA: NEGATIVE
CANDIDA KRUSEI DNA: NEGATIVE
CANDIDA RRNA VAG QL PROBE: NEGATIVE
N GONORRHOEA DNA SPEC QL NAA+PROBE: NOT DETECTED
T VAGINALIS RRNA GENITAL QL PROBE: NEGATIVE

## 2023-05-26 ENCOUNTER — TELEPHONE (OUTPATIENT)
Dept: OBSTETRICS AND GYNECOLOGY | Facility: CLINIC | Age: 33
End: 2023-05-26
Payer: MEDICAID

## 2023-05-26 RX ORDER — METRONIDAZOLE 500 MG/1
500 TABLET ORAL EVERY 12 HOURS
Qty: 14 TABLET | Refills: 0 | Status: SHIPPED | OUTPATIENT
Start: 2023-05-26 | End: 2023-06-02

## 2023-05-26 NOTE — TELEPHONE ENCOUNTER
GC/CT negative     Affirm resulted  Positive for BV  Rx for flagyl sent to pharmacy on file  Please inform patient    Gaurav Olvera M.D.   OB/GYN

## 2023-05-30 ENCOUNTER — TELEPHONE (OUTPATIENT)
Dept: OBSTETRICS AND GYNECOLOGY | Facility: CLINIC | Age: 33
End: 2023-05-30
Payer: MEDICAID

## 2023-05-30 LAB
FINAL PATHOLOGIC DIAGNOSIS: ABNORMAL
Lab: ABNORMAL

## 2023-06-23 ENCOUNTER — PROCEDURE VISIT (OUTPATIENT)
Dept: OBSTETRICS AND GYNECOLOGY | Facility: CLINIC | Age: 33
End: 2023-06-23
Payer: MEDICAID

## 2023-06-23 VITALS — SYSTOLIC BLOOD PRESSURE: 124 MMHG | WEIGHT: 226.5 LBS | BODY MASS INDEX: 41.43 KG/M2 | DIASTOLIC BLOOD PRESSURE: 78 MMHG

## 2023-06-23 DIAGNOSIS — R87.612 LGSIL ON PAP SMEAR OF CERVIX: Primary | ICD-10-CM

## 2023-06-23 PROCEDURE — 88342 CHG IMMUNOCYTOCHEMISTRY: ICD-10-PCS | Mod: 26,,, | Performed by: PATHOLOGY

## 2023-06-23 PROCEDURE — 88342 IMHCHEM/IMCYTCHM 1ST ANTB: CPT | Performed by: PATHOLOGY

## 2023-06-23 PROCEDURE — 88342 IMHCHEM/IMCYTCHM 1ST ANTB: CPT | Mod: 26,,, | Performed by: PATHOLOGY

## 2023-06-23 PROCEDURE — 88305 TISSUE EXAM BY PATHOLOGIST: CPT | Mod: 26,,, | Performed by: PATHOLOGY

## 2023-06-23 PROCEDURE — 88305 TISSUE EXAM BY PATHOLOGIST: ICD-10-PCS | Mod: 26,,, | Performed by: PATHOLOGY

## 2023-06-23 PROCEDURE — 88305 TISSUE EXAM BY PATHOLOGIST: CPT | Mod: 59 | Performed by: PATHOLOGY

## 2023-06-23 PROCEDURE — 87624 HPV HI-RISK TYP POOLED RSLT: CPT | Performed by: OBSTETRICS & GYNECOLOGY

## 2023-06-23 NOTE — PROGRESS NOTES
Chief Complaint   Patient presents with    Colposcopy       HPI:   Jose Kleintiford 32 y.o.  is here for discuss abnormal pap smear and colposcopy. She would like the HPV test done since it wasn't done with her last pap smear. She took the medications for BV but wants to know if cleared. She didn't have any symptoms and didn't notice a difference after treatment.       Patient's last menstrual period was 2023 (exact date).     Past Medical History:   Diagnosis Date    Diabetes mellitus, type 2        History reviewed. No pertinent surgical history.    Family History   Problem Relation Age of Onset    Breast cancer Paternal Aunt     Colon cancer Neg Hx     Ovarian cancer Neg Hx        Social History     Socioeconomic History    Marital status: Single   Tobacco Use    Smoking status: Every Day     Packs/day: 1.00     Years: 18.00     Pack years: 18.00     Types: Cigarettes    Smokeless tobacco: Never    Tobacco comments:     19 in smoking program    Substance and Sexual Activity    Alcohol use: Yes     Comment: occasionally    Drug use: Never    Sexual activity: Yes     Partners: Male     Comment: sometimes       OB History          2    Para   2    Term   1       1    AB        Living   2         SAB        IAB        Ectopic        Multiple        Live Births                      COMPREHENSIVE GYN HISTORY:  PAP History: reports has abnormal pap smear, about 8632-3235 therese, 2019 N ILM/HPV-; ; HPV non 16/18 +/ NILM ;  NILM/hPV+;  LSIL  Infection History: Denies STDs. Denies PID.  Benign History:Denies uterine fibroids. Denies ovarian cysts. Denies endometriosis Denies other conditions. Was told she has polycystic appearing ovaries on US   Cancer History: Denies cervical cancer. Denies uterine cancer or hyperplasia. Denies ovarian cancer. Denies vulvar cancer or pre-cancer. Denies vaginal cancer or pre-cancer. Denies breast cancer. Denies colon cancer.  Cycle:  11/mon/3d  No contraception      ROS:    All other ROS negative     PE:   /78   Wt 102.7 kg (226 lb 8 oz)   LMP 06/01/2023 (Exact Date)   BMI 41.43 kg/m²     APPEARANCE: Well nourished, well developed, in no acute distress.  See colpo note         No diagnosis found.    Plan:  Discussed if no signs of infection from BV then don't have to treat or retest. She understands and is ok not retesting  Discussed abnormal pap smears and HPV in detail. Discussed progression of HPV to cervical cancer and why we do screening. Discussed that whether she is HPV + won't change the recommendation for colposcopy today but she would like to do it so will do it today. See colposcopy note. Discussed smoking cessation as it will make her body less likely to clear the HPV is she is smoking. Got 2 of the HPV vaccine shots, discussed finishing the series if she would like.                 details… Alert & oriented; no sensory, motor or coordination deficits, normal reflexes

## 2023-06-27 ENCOUNTER — PATIENT MESSAGE (OUTPATIENT)
Dept: OBSTETRICS AND GYNECOLOGY | Facility: CLINIC | Age: 33
End: 2023-06-27
Payer: MEDICAID

## 2023-06-29 LAB
COMMENT: NORMAL
FINAL PATHOLOGIC DIAGNOSIS: NORMAL
GROSS: NORMAL
Lab: NORMAL

## 2023-07-06 ENCOUNTER — PATIENT MESSAGE (OUTPATIENT)
Dept: OBSTETRICS AND GYNECOLOGY | Facility: CLINIC | Age: 33
End: 2023-07-06
Payer: MEDICAID

## 2023-07-06 ENCOUNTER — TELEPHONE (OUTPATIENT)
Dept: OBSTETRICS AND GYNECOLOGY | Facility: HOSPITAL | Age: 33
End: 2023-07-06
Payer: MEDICAID

## 2023-07-07 ENCOUNTER — TELEPHONE (OUTPATIENT)
Dept: OBSTETRICS AND GYNECOLOGY | Facility: HOSPITAL | Age: 33
End: 2023-07-07
Payer: MEDICAID

## 2023-07-07 DIAGNOSIS — D06.9 SEVERE CERVICAL DYSPLASIA: Primary | ICD-10-CM

## 2023-07-07 NOTE — TELEPHONE ENCOUNTER
Called patient and was on car speaker so hard to hear and a lot of echos but discussed results and recommendation to proceed with conization. Discussed recovery time and surgery. She would like surgery done on Monday. Discussed with her that we do not have or time on Monday so would be Wednesday. All other questions answered.     Message sent to surgery scheduler.

## 2023-07-31 ENCOUNTER — PATIENT MESSAGE (OUTPATIENT)
Dept: OBSTETRICS AND GYNECOLOGY | Facility: CLINIC | Age: 33
End: 2023-07-31
Payer: MEDICAID

## 2023-08-02 ENCOUNTER — TELEPHONE (OUTPATIENT)
Dept: OBSTETRICS AND GYNECOLOGY | Facility: CLINIC | Age: 33
End: 2023-08-02
Payer: MEDICAID

## 2023-08-02 NOTE — TELEPHONE ENCOUNTER
----- Message from Mattie Valerio MA sent at 7/25/2023 10:49 AM CDT -----  Regarding: FW: surgery  Contact: [phone]    ----- Message -----  From: Hannah Pineda  Sent: 7/25/2023   8:04 AM CDT  To: [facility] Staff  Subject: surgery                                          Patient is requesting a call back regarding scheduling surgery.   Would the patient rather a call back or a response via [facility]?  Call   Best Call Back Number:  [phone]   Additional Information:

## 2023-08-03 ENCOUNTER — TELEPHONE (OUTPATIENT)
Dept: PREADMISSION TESTING | Facility: HOSPITAL | Age: 33
End: 2023-08-03
Payer: MEDICAID

## 2023-08-03 DIAGNOSIS — E11.9 TYPE 2 DIABETES MELLITUS WITHOUT COMPLICATION, WITHOUT LONG-TERM CURRENT USE OF INSULIN: Primary | ICD-10-CM

## 2023-08-03 DIAGNOSIS — Z01.818 PREOP EXAMINATION: ICD-10-CM

## 2023-08-29 ENCOUNTER — OFFICE VISIT (OUTPATIENT)
Dept: OBSTETRICS AND GYNECOLOGY | Facility: CLINIC | Age: 33
End: 2023-08-29
Payer: MEDICAID

## 2023-08-29 VITALS — BODY MASS INDEX: 41.88 KG/M2 | WEIGHT: 228.94 LBS

## 2023-08-29 DIAGNOSIS — Z01.818 PRE-OPERATIVE EXAM: Primary | ICD-10-CM

## 2023-08-29 DIAGNOSIS — D06.9 SEVERE DYSPLASIA OF CERVIX: ICD-10-CM

## 2023-08-29 PROCEDURE — 99999 PR PBB SHADOW E&M-EST. PATIENT-LVL III: ICD-10-PCS | Mod: PBBFAC,,, | Performed by: OBSTETRICS & GYNECOLOGY

## 2023-08-29 PROCEDURE — 99213 OFFICE O/P EST LOW 20 MIN: CPT | Mod: PBBFAC,PO | Performed by: OBSTETRICS & GYNECOLOGY

## 2023-08-29 PROCEDURE — 99499 NO LOS: ICD-10-PCS | Mod: S$PBB,,, | Performed by: OBSTETRICS & GYNECOLOGY

## 2023-08-29 PROCEDURE — 99999 PR PBB SHADOW E&M-EST. PATIENT-LVL III: CPT | Mod: PBBFAC,,, | Performed by: OBSTETRICS & GYNECOLOGY

## 2023-08-29 PROCEDURE — 99499 UNLISTED E&M SERVICE: CPT | Mod: S$PBB,,, | Performed by: OBSTETRICS & GYNECOLOGY

## 2023-08-29 RX ORDER — MUPIROCIN 20 MG/G
OINTMENT TOPICAL
Status: CANCELLED | OUTPATIENT
Start: 2023-08-29

## 2023-08-29 RX ORDER — SODIUM CHLORIDE 9 MG/ML
INJECTION, SOLUTION INTRAVENOUS CONTINUOUS
Status: CANCELLED | OUTPATIENT
Start: 2023-08-29

## 2023-08-29 RX ORDER — FAMOTIDINE 20 MG/1
20 TABLET, FILM COATED ORAL
Status: CANCELLED | OUTPATIENT
Start: 2023-08-29

## 2023-08-29 RX ORDER — DOXYCYCLINE 100 MG/1
100 CAPSULE ORAL 2 TIMES DAILY
COMMUNITY
End: 2023-10-04

## 2023-08-29 NOTE — H&P (VIEW-ONLY)
Diagnosis: severe dysplasia   Planned Procedure: conization   Date of Planned Procedure: 23     Cc: I am here for preop for my surgery    HPI: Jose Hernandez is a 32 y.o. female with history of severe dysplasia on cervix on colposcopy who is here for pre-op/     ROS:  Negative .     PMHx:   Past Medical History:   Diagnosis Date    Diabetes mellitus, type 2        Surgical hx: History reviewed. No pertinent surgical history.    GYNhx: Patient's last menstrual period was 2023 (exact date).    Obhx:     Review of patient's allergies indicates:  No Known Allergies    MEDS: Reviewed, reconciled      Current Outpatient Medications:     doxycycline (VIBRAMYCIN) 100 MG Cap, Take 100 mg by mouth 2 (two) times daily., Disp: , Rfl:     HUMIRA,CF, PEN 40 mg/0.4 mL PnKt, Inject 40 mg as directed every 7 days., Disp: , Rfl:     atorvastatin (LIPITOR) 20 MG tablet, Take 20 mg by mouth once daily., Disp: , Rfl:     hpv vaccine,9-wendy (GARDASIL 9, PF,) 0.5 mL Syrg, To be given by [facility] (Patient not taking: Reported on 2023), Disp: 0.5 mL, Rfl: 1    medroxyPROGESTERone (PROVERA) 10 MG tablet, Take 1 tablet (10 mg total) by mouth once daily. for 10 days, Disp: 10 tablet, Rfl: 0    metFORMIN (GLUCOPHAGE) 500 MG tablet, Take 500 mg by mouth once daily., Disp: , Rfl:     omeprazole (PRILOSEC) 20 MG capsule, Take 20 mg by mouth once daily., Disp: , Rfl:     Social hx:    Social History     Socioeconomic History    Marital status: Single   Tobacco Use    Smoking status: Every Day     Current packs/day: 1.00     Average packs/day: 1 pack/day for 18.0 years (18.0 ttl pk-yrs)     Types: Cigarettes    Smokeless tobacco: Never    Tobacco comments:     19 in smoking program    Substance and Sexual Activity    Alcohol use: Yes     Comment: occasionally    Drug use: Never    Sexual activity: Yes     Partners: Male     Comment: sometimes       Family hx:    Family History   Problem Relation Age of Onset    Breast  cancer Paternal Aunt     Colon cancer Neg Hx     Ovarian cancer Neg Hx        PE:   Vitals: Wt 103.8 kg (228 lb 15.2 oz)   LMP 08/28/2023 (Exact Date)   BMI 41.88 kg/m²   APPEARANCE: Well nourished, well developed, in no acute distress.  SKIN: Normal skin turgor, no lesions.    Pathology:   Final Diagnostic Interpretation   Low-grade squamous intraepithelial lesion    8/29/23 pathology: 1. ENDOCERVIX, CURETTAGE:   - Benign endocervical tissue with squamous metaplasia.   - No dysplasia.   - A p16 immunohistochemical stain supports the diagnosis.     2. CERVIX, 05:00, BIOPSY:   - High-grade squamous intraepithelial lesion/cervical intraepithelial neoplasia 2-3 (SYD 2-3).   - A p16 immunohistochemical stain supports the diagnosis.     3. CERVIX, 01:00, BIOPSY:   - Benign squamous mucosa.   - No transformation zone present for evaluation.   - No dysplasia.       A/P: Jose Hernandez is a 32 y.o. female who presents for preop evaluation.      1) Surgery:   -Risks and benefits of surgery discussed with the patient.  All questions were answered.  Consents for surgery and blood were signed by the patient today. The risks, benefits and conization surgery in detail were discussed.  The potential for injury to rectum, vagina, and bladder was discussed in addition to the risk of bleeding and infection. The possible need for a blood transfusion discussed.  The potential of residual disease, recurrent disease and need for more surgery was discussed.  After the pros, cons risks and benefits were discussed the patient decided to have the surgery and she was consented for the procedures in usual fashion. All questions were answered and written informed consent has been obtained.     -Patient has been instructed to be NPO on night prior to procedure, knows to hold metformin but not taking it anyway.   -Preop labs ordered: will do am of surgery     -postop visit scheduled 2 weeks.     Patient to proceed now immediately to  preop

## 2023-08-29 NOTE — PROGRESS NOTES
Diagnosis: severe dysplasia   Planned Procedure: conization   Date of Planned Procedure: 23     Cc: I am here for preop for my surgery    HPI: Jose Hernandez is a 32 y.o. female with history of severe dysplasia on cervix on colposcopy who is here for pre-op/     ROS:  Negative .     PMHx:   Past Medical History:   Diagnosis Date    Diabetes mellitus, type 2        Surgical hx: History reviewed. No pertinent surgical history.    GYNhx: Patient's last menstrual period was 2023 (exact date).    Obhx:     Review of patient's allergies indicates:  No Known Allergies    MEDS: Reviewed, reconciled      Current Outpatient Medications:     doxycycline (VIBRAMYCIN) 100 MG Cap, Take 100 mg by mouth 2 (two) times daily., Disp: , Rfl:     HUMIRA,CF, PEN 40 mg/0.4 mL PnKt, Inject 40 mg as directed every 7 days., Disp: , Rfl:     atorvastatin (LIPITOR) 20 MG tablet, Take 20 mg by mouth once daily., Disp: , Rfl:     hpv vaccine,9-wendy (GARDASIL 9, PF,) 0.5 mL Syrg, To be given by [facility] (Patient not taking: Reported on 2023), Disp: 0.5 mL, Rfl: 1    medroxyPROGESTERone (PROVERA) 10 MG tablet, Take 1 tablet (10 mg total) by mouth once daily. for 10 days, Disp: 10 tablet, Rfl: 0    metFORMIN (GLUCOPHAGE) 500 MG tablet, Take 500 mg by mouth once daily., Disp: , Rfl:     omeprazole (PRILOSEC) 20 MG capsule, Take 20 mg by mouth once daily., Disp: , Rfl:     Social hx:    Social History     Socioeconomic History    Marital status: Single   Tobacco Use    Smoking status: Every Day     Current packs/day: 1.00     Average packs/day: 1 pack/day for 18.0 years (18.0 ttl pk-yrs)     Types: Cigarettes    Smokeless tobacco: Never    Tobacco comments:     19 in smoking program    Substance and Sexual Activity    Alcohol use: Yes     Comment: occasionally    Drug use: Never    Sexual activity: Yes     Partners: Male     Comment: sometimes       Family hx:    Family History   Problem Relation Age of Onset    Breast  cancer Paternal Aunt     Colon cancer Neg Hx     Ovarian cancer Neg Hx        PE:   Vitals: Wt 103.8 kg (228 lb 15.2 oz)   LMP 08/28/2023 (Exact Date)   BMI 41.88 kg/m²   APPEARANCE: Well nourished, well developed, in no acute distress.  SKIN: Normal skin turgor, no lesions.    Pathology:   Final Diagnostic Interpretation   Low-grade squamous intraepithelial lesion    8/29/23 pathology: 1. ENDOCERVIX, CURETTAGE:   - Benign endocervical tissue with squamous metaplasia.   - No dysplasia.   - A p16 immunohistochemical stain supports the diagnosis.     2. CERVIX, 05:00, BIOPSY:   - High-grade squamous intraepithelial lesion/cervical intraepithelial neoplasia 2-3 (SYD 2-3).   - A p16 immunohistochemical stain supports the diagnosis.     3. CERVIX, 01:00, BIOPSY:   - Benign squamous mucosa.   - No transformation zone present for evaluation.   - No dysplasia.       A/P: Jose Hernandez is a 32 y.o. female who presents for preop evaluation.      1) Surgery:   -Risks and benefits of surgery discussed with the patient.  All questions were answered.  Consents for surgery and blood were signed by the patient today. The risks, benefits and conization surgery in detail were discussed.  The potential for injury to rectum, vagina, and bladder was discussed in addition to the risk of bleeding and infection. The possible need for a blood transfusion discussed.  The potential of residual disease, recurrent disease and need for more surgery was discussed.  After the pros, cons risks and benefits were discussed the patient decided to have the surgery and she was consented for the procedures in usual fashion. All questions were answered and written informed consent has been obtained.     -Patient has been instructed to be NPO on night prior to procedure, knows to hold metformin but not taking it anyway.   -Preop labs ordered: will do am of surgery     -postop visit scheduled 2 weeks.     Patient to proceed now immediately to  preop

## 2023-08-31 ENCOUNTER — HOSPITAL ENCOUNTER (OUTPATIENT)
Dept: PREADMISSION TESTING | Facility: HOSPITAL | Age: 33
Discharge: HOME | End: 2023-08-31
Attending: NURSE PRACTITIONER
Payer: MEDICAID

## 2023-08-31 ENCOUNTER — ANESTHESIA EVENT (OUTPATIENT)
Dept: SURGERY | Facility: HOSPITAL | Age: 33
End: 2023-08-31
Payer: MEDICAID

## 2023-08-31 RX ORDER — MELOXICAM 15 MG/1
15 TABLET ORAL
COMMUNITY
Start: 2023-08-28 | End: 2023-10-04

## 2023-08-31 NOTE — ANESTHESIA PREPROCEDURE EVALUATION
08/31/2023     Jose Hernandez is a 32 y.o., female here for [facility]    Past Medical History:   Diagnosis Date    Diabetes mellitus, type 2      History reviewed. No pertinent surgical history.        Pre-op Assessment    I have reviewed the Patient Summary Reports.     I have reviewed the Nursing Notes. I have reviewed the NPO Status.      Review of Systems  Anesthesia Hx:   Denies Personal Hx of Anesthesia complications.   Cardiovascular:   Exercise tolerance: good    Endocrine:   Diabetes  Morbid Obesity / BMI > 40  Psych:   Psychiatric History          Physical Exam  General: Well nourished    Airway:  Mallampati: II   Mouth Opening: Normal  Neck ROM: Normal ROM        Anesthesia Plan  Type of Anesthesia, risks & benefits discussed:    Anesthesia Type: Gen Natural Airway, Gen Supraglottic Airway, Gen ETT  Informed Consent: Informed consent signed with the Patient and all parties understand the risks and agree with anesthesia plan.  All questions answered.   ASA Score: 3    Ready For Surgery From Anesthesia Perspective.     .

## 2023-08-31 NOTE — DISCHARGE INSTRUCTIONS
Your surgery is scheduled for 9/6/23.    Please report to [facility] on the 1st Floor at 0930 a.m.    THIS TIME IS SUBJECT TO CHANGE.  YOU WILL RECEIVE A PHONE CALL THE DAY BEFORE SURGERY BY 3:30 PM TO CONFIRM YOUR TIME OF ARRIVAL.  IF YOU HAVE NOT RECEIVED A PHONE CALL BY 3:30 PM THE DAY BEFORE YOUR SURGERY PLEASE CALL [phone]     INSTRUCTIONS IMPORTANT!!!  ¨ Do not eat or drink after 12 midnight-including water, candy, gum, & mints. OK to brush teeth.      ____  Proceed to [facility] on *** for additional testing.        ____  Do not wear makeup, including mascara.  ____  No powder, lotions or creams to surgical area.  ____  Please remove all jewelry, including piercings and leave at home.  ____  No money or valuables needed. Please leave at home.  ____  Please bring any documents given by your doctor.  ____  If going home the same day, arrange for a ride home. You will not be able to             drive if Anesthesia was used.  ____  Children under 18 years require a parent / guardian present the entire time             they are in surgery / recovery.  ____  Wear loose fitting clothing. Allow for dressings, bandages.  ____  Stop Aspirin, Ibuprofen, Motrin, Aleve, Goody's/BC powders, Excedrine and Naproxen (NSAIDS) at least 3-5 days before surgery, unless otherwise instructed by your doctor, or the nurse.   You MAY use Tylenol/acetaminophen until day of surgery.  ____  Wash the surgical area with Hibiclens or Dial Antibacterial bar soap the night before surgery, and again the             morning of surgery.  Be sure to rinse hibiclens or Dial Antibacterial bar soap off completely (if instructed by   nurse).  ____  If you take diabetic medication including Metformin, Glimepiride, Glipizide, Glyburide, Byetta, Januvia, Actos, do not take am of surgery unless instructed by Doctor.  ____ Hold Invokana, Farxiga, and Jardiance for 3 days prior to surgery.   ____  Call MD for temperature  above 101 degrees or any other signs of infection such as Urinary (bladder) infection, Upper respiratory infection, skin boils, etc.  ____ Stop taking any Fish Oil supplement or any Vitamins that contain Vitamin E at least 5 days prior to surgery.  ____ Do Not wear your contact lenses the day of your procedure.  You may wear your glasses.      ____Do not shave surgical site for 3 days prior to surgery.  ____ Practice Good hand washing before, during, and after procedure.      I have read or had read and explained to me, and understand the above information.  Additional comments or instructions:  For additional questions call [phone]      ANESTHESIA SIDE EFFECTS  -For the first 24 hours after surgery:  Do not drive, use heavy equipment, make important decisions, or drink alcohol  -It is normal to feel sleepy for several hours.  Rest until you are more awake.  -Have someone stay with you, if needed.  They can watch for problems and help keep you safe.  -Some possible post anesthesia side effects include: nausea and vomiting, sore throat and hoarseness, sleepiness, and dizziness.        Pre-Op Bathing Instructions    Before surgery, you can play an important role in your own health.    Because skin is not sterile, we need to be sure that your skin is as free of germs as possible. By following the instructions below, you can reduce the number of germs on your skin before surgery.    IMPORTANT: You will need to shower with a special soap called Hibiclens*, available at any pharmacy.  If you are allergic to Chlorhexidine (the antiseptic in Hibiclens), use an antibacterial soap such as Dial Soap for your preoperative shower.  You will shower with Hibiclens both the night before your surgery and the morning of your surgery.  Do not use Hibiclens on the head, face or genitals to avoid injury to those areas.    STEP #1: THE NIGHT BEFORE YOUR SURGERY     Do not shave the area of your body where your surgery will be  performed.  Shower and wash your hair and body as usual with your normal soap and shampoo.  Rinse your hair and body thoroughly after you shower to remove all soap residue.  With your hand, apply one packet of Hibiclens soap to the surgical site.   Wash the site gently for five (5) minutes. Do not scrub your skin too hard.   Do not wash with your regular soap after Hibiclens is used.  Rinse your body thoroughly.  Pat yourself dry with a clean, soft towel.  Do not use lotion, cream, or powder.  Wear clean clothes.    STEP #2: THE MORNING OF YOUR SURGERY     Repeat Step #1.    * Not to be used by people allergic to Chlorhexidine.

## 2023-09-06 ENCOUNTER — ANESTHESIA (OUTPATIENT)
Dept: SURGERY | Facility: HOSPITAL | Age: 33
End: 2023-09-06
Payer: MEDICAID

## 2023-09-06 ENCOUNTER — HOSPITAL ENCOUNTER (OUTPATIENT)
Facility: HOSPITAL | Age: 33
Discharge: HOME | End: 2023-09-06
Attending: OBSTETRICS & GYNECOLOGY | Admitting: OBSTETRICS & GYNECOLOGY
Payer: MEDICAID

## 2023-09-06 VITALS
SYSTOLIC BLOOD PRESSURE: 129 MMHG | OXYGEN SATURATION: 100 % | WEIGHT: 225 LBS | HEIGHT: 62 IN | BODY MASS INDEX: 41.41 KG/M2 | HEART RATE: 86 BPM | RESPIRATION RATE: 18 BRPM | DIASTOLIC BLOOD PRESSURE: 75 MMHG | TEMPERATURE: 98 F

## 2023-09-06 DIAGNOSIS — D06.9 SEVERE DYSPLASIA OF CERVIX: Primary | ICD-10-CM

## 2023-09-06 DIAGNOSIS — Z01.818 PRE-OPERATIVE EXAM: ICD-10-CM

## 2023-09-06 LAB
ABO GROUP BLD: NORMAL
B-HCG UR QL: NEGATIVE
BASOPHILS # BLD AUTO: 0.04 K/UL (ref 0–0.2)
BASOPHILS NFR BLD: 0.5 % (ref 0–1.9)
BLD GP AB SCN CELLS X3 SERPL QL: NORMAL
CTP QC/QA: YES
DIFFERENTIAL METHOD: ABNORMAL
EOSINOPHIL # BLD AUTO: 0.3 K/UL (ref 0–0.5)
EOSINOPHIL NFR BLD: 3.5 % (ref 0–8)
ERYTHROCYTE [DISTWIDTH] IN BLOOD BY AUTOMATED COUNT: 15.8 % (ref 11.5–14.5)
HCT VFR BLD AUTO: 43.2 % (ref 37–48.5)
HGB BLD-MCNC: 13.8 G/DL (ref 12–16)
IMM GRANULOCYTES # BLD AUTO: 0.13 K/UL (ref 0–0.04)
IMM GRANULOCYTES NFR BLD AUTO: 1.5 % (ref 0–0.5)
LYMPHOCYTES # BLD AUTO: 3 K/UL (ref 1–4.8)
LYMPHOCYTES NFR BLD: 36 % (ref 18–48)
MCH RBC QN AUTO: 25.5 PG (ref 27–31)
MCHC RBC AUTO-ENTMCNC: 31.9 G/DL (ref 32–36)
MCV RBC AUTO: 80 FL (ref 82–98)
MONOCYTES # BLD AUTO: 0.5 K/UL (ref 0.3–1)
MONOCYTES NFR BLD: 5.4 % (ref 4–15)
NEUTROPHILS # BLD AUTO: 4.5 K/UL (ref 1.8–7.7)
NEUTROPHILS NFR BLD: 53.1 % (ref 38–73)
NRBC BLD-RTO: 0 /100 WBC
PLATELET # BLD AUTO: 340 K/UL (ref 150–450)
PMV BLD AUTO: 10.1 FL (ref 9.2–12.9)
POCT GLUCOSE: 125 MG/DL (ref 70–110)
RBC # BLD AUTO: 5.41 M/UL (ref 4–5.4)
RH BLD: NORMAL
SPECIMEN OUTDATE: NORMAL
WBC # BLD AUTO: 8.4 K/UL (ref 3.9–12.7)

## 2023-09-06 PROCEDURE — 57520 CONIZATION OF CERVIX: CPT | Mod: ,,, | Performed by: OBSTETRICS & GYNECOLOGY

## 2023-09-06 PROCEDURE — 63600175 PHARM REV CODE 636 W HCPCS: Performed by: ANESTHESIOLOGY

## 2023-09-06 PROCEDURE — D9220A PRA ANESTHESIA: ICD-10-PCS | Mod: CRNA,,, | Performed by: NURSE ANESTHETIST, CERTIFIED REGISTERED

## 2023-09-06 PROCEDURE — 88307 PR  SURG PATH,LEVEL V: ICD-10-PCS | Mod: 26,,, | Performed by: PATHOLOGY

## 2023-09-06 PROCEDURE — 88305 TISSUE EXAM BY PATHOLOGIST: ICD-10-PCS | Mod: 26,,, | Performed by: PATHOLOGY

## 2023-09-06 PROCEDURE — 25000003 PHARM REV CODE 250: Performed by: NURSE ANESTHETIST, CERTIFIED REGISTERED

## 2023-09-06 PROCEDURE — 56405 PR I&D OF VULVA/PERINEUM ABSCESS: ICD-10-PCS | Mod: 51,,, | Performed by: OBSTETRICS & GYNECOLOGY

## 2023-09-06 PROCEDURE — D9220A PRA ANESTHESIA: Mod: ANES,,, | Performed by: ANESTHESIOLOGY

## 2023-09-06 PROCEDURE — 86901 BLOOD TYPING SEROLOGIC RH(D): CPT | Performed by: OBSTETRICS & GYNECOLOGY

## 2023-09-06 PROCEDURE — D9220A PRA ANESTHESIA: Mod: CRNA,,, | Performed by: NURSE ANESTHETIST, CERTIFIED REGISTERED

## 2023-09-06 PROCEDURE — 71000015 HC POSTOP RECOV 1ST HR: Performed by: OBSTETRICS & GYNECOLOGY

## 2023-09-06 PROCEDURE — 71000039 HC RECOVERY, EACH ADD'L HOUR: Performed by: OBSTETRICS & GYNECOLOGY

## 2023-09-06 PROCEDURE — 56405 I&D VULVA/PERINEAL ABSCESS: CPT | Mod: 51,,, | Performed by: OBSTETRICS & GYNECOLOGY

## 2023-09-06 PROCEDURE — 25000003 PHARM REV CODE 250: Performed by: ANESTHESIOLOGY

## 2023-09-06 PROCEDURE — 81025 URINE PREGNANCY TEST: CPT | Performed by: OBSTETRICS & GYNECOLOGY

## 2023-09-06 PROCEDURE — 71000033 HC RECOVERY, INTIAL HOUR: Performed by: OBSTETRICS & GYNECOLOGY

## 2023-09-06 PROCEDURE — 88307 TISSUE EXAM BY PATHOLOGIST: CPT | Mod: 26,,, | Performed by: PATHOLOGY

## 2023-09-06 PROCEDURE — 57520 PR CONIZATION CERVIX,KNIFE/LASER: ICD-10-PCS | Mod: ,,, | Performed by: OBSTETRICS & GYNECOLOGY

## 2023-09-06 PROCEDURE — 86900 BLOOD TYPING SEROLOGIC ABO: CPT | Performed by: OBSTETRICS & GYNECOLOGY

## 2023-09-06 PROCEDURE — 36000706: Performed by: OBSTETRICS & GYNECOLOGY

## 2023-09-06 PROCEDURE — 36415 COLL VENOUS BLD VENIPUNCTURE: CPT | Performed by: OBSTETRICS & GYNECOLOGY

## 2023-09-06 PROCEDURE — 37000009 HC ANESTHESIA EA ADD 15 MINS: Performed by: OBSTETRICS & GYNECOLOGY

## 2023-09-06 PROCEDURE — 88305 TISSUE EXAM BY PATHOLOGIST: CPT | Performed by: PATHOLOGY

## 2023-09-06 PROCEDURE — 86850 RBC ANTIBODY SCREEN: CPT | Performed by: OBSTETRICS & GYNECOLOGY

## 2023-09-06 PROCEDURE — 88307 TISSUE EXAM BY PATHOLOGIST: CPT | Performed by: PATHOLOGY

## 2023-09-06 PROCEDURE — 63600175 PHARM REV CODE 636 W HCPCS: Performed by: NURSE ANESTHETIST, CERTIFIED REGISTERED

## 2023-09-06 PROCEDURE — 25000003 PHARM REV CODE 250: Performed by: OBSTETRICS & GYNECOLOGY

## 2023-09-06 PROCEDURE — 88305 TISSUE EXAM BY PATHOLOGIST: CPT | Mod: 26,,, | Performed by: PATHOLOGY

## 2023-09-06 PROCEDURE — 36000707: Performed by: OBSTETRICS & GYNECOLOGY

## 2023-09-06 PROCEDURE — D9220A PRA ANESTHESIA: ICD-10-PCS | Mod: ANES,,, | Performed by: ANESTHESIOLOGY

## 2023-09-06 PROCEDURE — 37000008 HC ANESTHESIA 1ST 15 MINUTES: Performed by: OBSTETRICS & GYNECOLOGY

## 2023-09-06 PROCEDURE — 85025 COMPLETE CBC W/AUTO DIFF WBC: CPT | Performed by: OBSTETRICS & GYNECOLOGY

## 2023-09-06 RX ORDER — ONDANSETRON 8 MG/1
8 TABLET, ORALLY DISINTEGRATING ORAL EVERY 8 HOURS PRN
Status: DISCONTINUED | OUTPATIENT
Start: 2023-09-06 | End: 2023-09-06 | Stop reason: HOSPADM

## 2023-09-06 RX ORDER — MIDAZOLAM HYDROCHLORIDE 1 MG/ML
INJECTION INTRAMUSCULAR; INTRAVENOUS
Status: DISCONTINUED | OUTPATIENT
Start: 2023-09-06 | End: 2023-09-06

## 2023-09-06 RX ORDER — LIDOCAINE HYDROCHLORIDE AND EPINEPHRINE 10; 10 MG/ML; UG/ML
INJECTION, SOLUTION INFILTRATION; PERINEURAL
Status: DISCONTINUED | OUTPATIENT
Start: 2023-09-06 | End: 2023-09-06 | Stop reason: HOSPADM

## 2023-09-06 RX ORDER — FENTANYL CITRATE 50 UG/ML
INJECTION, SOLUTION INTRAMUSCULAR; INTRAVENOUS
Status: DISCONTINUED | OUTPATIENT
Start: 2023-09-06 | End: 2023-09-06

## 2023-09-06 RX ORDER — ACETAMINOPHEN 10 MG/ML
INJECTION, SOLUTION INTRAVENOUS
Status: DISCONTINUED | OUTPATIENT
Start: 2023-09-06 | End: 2023-09-06

## 2023-09-06 RX ORDER — DIPHENHYDRAMINE HCL 25 MG
25 CAPSULE ORAL EVERY 4 HOURS PRN
Status: DISCONTINUED | OUTPATIENT
Start: 2023-09-06 | End: 2023-09-06 | Stop reason: HOSPADM

## 2023-09-06 RX ORDER — FAMOTIDINE 20 MG/1
20 TABLET, FILM COATED ORAL
Status: COMPLETED | OUTPATIENT
Start: 2023-09-06 | End: 2023-09-06

## 2023-09-06 RX ORDER — SODIUM CHLORIDE, SODIUM LACTATE, POTASSIUM CHLORIDE, CALCIUM CHLORIDE 600; 310; 30; 20 MG/100ML; MG/100ML; MG/100ML; MG/100ML
INJECTION, SOLUTION INTRAVENOUS CONTINUOUS PRN
Status: DISCONTINUED | OUTPATIENT
Start: 2023-09-06 | End: 2023-09-06

## 2023-09-06 RX ORDER — HYDROMORPHONE HYDROCHLORIDE 2 MG/ML
0.5 INJECTION, SOLUTION INTRAMUSCULAR; INTRAVENOUS; SUBCUTANEOUS EVERY 5 MIN PRN
Status: DISCONTINUED | OUTPATIENT
Start: 2023-09-06 | End: 2023-09-06 | Stop reason: HOSPADM

## 2023-09-06 RX ORDER — MUPIROCIN 20 MG/G
OINTMENT TOPICAL
Status: DISCONTINUED | OUTPATIENT
Start: 2023-09-06 | End: 2023-09-06 | Stop reason: HOSPADM

## 2023-09-06 RX ORDER — PROPOFOL 10 MG/ML
INJECTION, EMULSION INTRAVENOUS
Status: DISCONTINUED | OUTPATIENT
Start: 2023-09-06 | End: 2023-09-06

## 2023-09-06 RX ORDER — IBUPROFEN 800 MG/1
800 TABLET ORAL EVERY 8 HOURS PRN
Qty: 30 TABLET | Refills: 2 | Status: SHIPPED | OUTPATIENT
Start: 2023-09-06 | End: 2023-10-04

## 2023-09-06 RX ORDER — OXYCODONE AND ACETAMINOPHEN 5; 325 MG/1; MG/1
1 TABLET ORAL EVERY 4 HOURS PRN
Qty: 5 TABLET | Refills: 0 | Status: SHIPPED | OUTPATIENT
Start: 2023-09-06 | End: 2023-10-04

## 2023-09-06 RX ORDER — HYDROCODONE BITARTRATE AND ACETAMINOPHEN 5; 325 MG/1; MG/1
1 TABLET ORAL EVERY 4 HOURS PRN
Status: DISCONTINUED | OUTPATIENT
Start: 2023-09-06 | End: 2023-09-06 | Stop reason: HOSPADM

## 2023-09-06 RX ORDER — ONDANSETRON 2 MG/ML
4 INJECTION INTRAMUSCULAR; INTRAVENOUS DAILY PRN
Status: DISCONTINUED | OUTPATIENT
Start: 2023-09-06 | End: 2023-09-06 | Stop reason: HOSPADM

## 2023-09-06 RX ORDER — LIDOCAINE HYDROCHLORIDE 20 MG/ML
INJECTION INTRAVENOUS
Status: DISCONTINUED | OUTPATIENT
Start: 2023-09-06 | End: 2023-09-06

## 2023-09-06 RX ORDER — SODIUM CHLORIDE 9 MG/ML
INJECTION, SOLUTION INTRAVENOUS CONTINUOUS
Status: DISCONTINUED | OUTPATIENT
Start: 2023-09-06 | End: 2023-09-06 | Stop reason: HOSPADM

## 2023-09-06 RX ORDER — KETOROLAC TROMETHAMINE 30 MG/ML
INJECTION, SOLUTION INTRAMUSCULAR; INTRAVENOUS
Status: DISCONTINUED | OUTPATIENT
Start: 2023-09-06 | End: 2023-09-06

## 2023-09-06 RX ORDER — PROCHLORPERAZINE EDISYLATE 5 MG/ML
5 INJECTION INTRAMUSCULAR; INTRAVENOUS EVERY 6 HOURS PRN
Status: DISCONTINUED | OUTPATIENT
Start: 2023-09-06 | End: 2023-09-06 | Stop reason: HOSPADM

## 2023-09-06 RX ORDER — OXYCODONE HYDROCHLORIDE 5 MG/1
5 TABLET ORAL
Status: DISCONTINUED | OUTPATIENT
Start: 2023-09-06 | End: 2023-09-06 | Stop reason: HOSPADM

## 2023-09-06 RX ORDER — ONDANSETRON 2 MG/ML
INJECTION INTRAMUSCULAR; INTRAVENOUS
Status: DISCONTINUED | OUTPATIENT
Start: 2023-09-06 | End: 2023-09-06

## 2023-09-06 RX ORDER — DIPHENHYDRAMINE HYDROCHLORIDE 50 MG/ML
25 INJECTION INTRAMUSCULAR; INTRAVENOUS EVERY 4 HOURS PRN
Status: DISCONTINUED | OUTPATIENT
Start: 2023-09-06 | End: 2023-09-06 | Stop reason: HOSPADM

## 2023-09-06 RX ADMIN — ACETAMINOPHEN 1000 MG: 10 INJECTION, SOLUTION INTRAVENOUS at 12:09

## 2023-09-06 RX ADMIN — PROPOFOL 200 MG: 10 INJECTION, EMULSION INTRAVENOUS at 12:09

## 2023-09-06 RX ADMIN — FAMOTIDINE 20 MG: 20 TABLET, FILM COATED ORAL at 10:09

## 2023-09-06 RX ADMIN — MUPIROCIN: 20 OINTMENT TOPICAL at 10:09

## 2023-09-06 RX ADMIN — OXYCODONE HYDROCHLORIDE 5 MG: 5 TABLET ORAL at 02:09

## 2023-09-06 RX ADMIN — FENTANYL CITRATE 50 MCG: 50 INJECTION, SOLUTION INTRAMUSCULAR; INTRAVENOUS at 12:09

## 2023-09-06 RX ADMIN — KETOROLAC TROMETHAMINE 30 MG: 30 INJECTION, SOLUTION INTRAMUSCULAR; INTRAVENOUS at 01:09

## 2023-09-06 RX ADMIN — HYDROMORPHONE HYDROCHLORIDE 0.5 MG: 2 INJECTION INTRAMUSCULAR; INTRAVENOUS; SUBCUTANEOUS at 02:09

## 2023-09-06 RX ADMIN — ONDANSETRON 8 MG: 2 INJECTION, SOLUTION INTRAMUSCULAR; INTRAVENOUS at 12:09

## 2023-09-06 RX ADMIN — LIDOCAINE HYDROCHLORIDE 100 MG: 20 INJECTION, SOLUTION INTRAVENOUS at 12:09

## 2023-09-06 RX ADMIN — SODIUM CHLORIDE, SODIUM LACTATE, POTASSIUM CHLORIDE, AND CALCIUM CHLORIDE: .6; .31; .03; .02 INJECTION, SOLUTION INTRAVENOUS at 12:09

## 2023-09-06 RX ADMIN — MIDAZOLAM HYDROCHLORIDE 2 MG: 1 INJECTION, SOLUTION INTRAMUSCULAR; INTRAVENOUS at 12:09

## 2023-09-06 NOTE — PLAN OF CARE
Pacu discharge criteria met. Report called to Marley Rn/ops. Transported to ops via stretcher,sr upx2.

## 2023-09-06 NOTE — PLAN OF CARE
OOB to bathroom with minimal assistance required.  Able to void moderate amount of clear yellow urine without difficulty.  Back to bed with no assistance required.  States readiness to be discharged.    yes

## 2023-09-06 NOTE — TRANSFER OF CARE
"Anesthesia Transfer of Care Note    Patient: Jose Henrandez    Procedure(s) Performed: Procedure(s) (LRB):  CONE BIOPSY, CERVIX, USING COLD KNIFE (N/A)    Patient location: PACU    Anesthesia Type: general    Transport from OR: Transported from OR on 6-10 L/min O2 by face mask with adequate spontaneous ventilation    Post pain: adequate analgesia    Post assessment: no apparent anesthetic complications    Post vital signs: stable    Nausea/Vomiting: no nausea/vomiting    Complications: none    Transfer of care protocol was followed      Last vitals:   Visit Vitals  /63 (BP Location: Right arm, Patient Position: Lying)   Pulse 84   Temp 36.9 °C (98.4 °F) (Skin)   Resp 17   Ht 5' 2" (1.575 m)   Wt 102.1 kg (225 lb)   SpO2 99%   Breastfeeding No   BMI 41.15 kg/m²     "

## 2023-09-06 NOTE — OP NOTE
OPERATIVE REPORT     09/06/2023  Procedure: CKC and I&D of labia   Pre-operative Diagnosis: severe cervical dyplasia and labia abscess  Post-operative Diagnosis: same  Surgeon: Tammy Martinez MD  Assistant: Charito Magana   Anesthesia: MAC  Findings: large non-staining area over entire posterior cervix, 1 cm abscess in left labia   EBL: 5  Specimen: cone bed marked at 12 and ECC   Complications: none       TECHNIQUE:     The patient was taken to the Operating Room where her general anesthesia was found to be adequate. Legs were placed in Tevin stirrups. She was then prepared and draped in normal sterile fashion. An open valved speculum was placed into the vagina.     Cervix was visualized and grasped with a single-tooth tenaculum at the anterior portion.   Stay sutures were placed at the 3 o'clock and the 9 o'clock area using #1 chromic suture. Lugol solution was applied to the cervix where I see the Lugol's changes were noted as above.     Scalpel was then used to make a circumferential incision around the cervix and it was tagged at the 12 o'clock position. Endocervical curetting were obtained. Bovie was then used to obtain hemostasis.  At the end the procedure, Monsel's solution was applied to the cervix and hemostasis was noted. All instruments were then removed from the vagina.    Attention was turned to the labia and a stab incision was made with a 11 blade and purulent material was expressed      The patient tolerated the procedure well. Sponge, lap, needle counts were   correct x3.  She was extubated successfully in the Operating Room and taken to the PACU in stable condition.

## 2023-09-06 NOTE — DISCHARGE INSTRUCTIONS
BATHING:                   You may shower after your dressing is removed, but no tub baths, hot tubs, saunas or swimming until you see the doctor.    DRESSING:  Remove your bandage in 24 hours. If there are skin tapes over the incision, leave them in place. They will start to come off in 5-7 days.  ACTIVITY LEVEL: If you have received sedation or an anesthetic, you may feel sleepy for   several hours. Rest until you are more awake. Gradually resume your normal activities  No heavy lifting or straining, nothing over 10 lbs., like a gallon of milk.  Pelvic rest- no sex, tampons or douching until follow up or instructed by doctor.  DIET:  You may resume your home diet. If nausea is present, increase your diet gradually with fluids and bland foods.    Medications:  Pain medication should be taken only if needed and as directed. If antibiotics are prescribed, the medication should be taken until completed. You will be given an updated list of you medications.  No driving, alcoholic beverages or signing legal documents for next 24 hours or while taking pain medication    CALL THE DOCTOR:   For any obvious bleeding (some dried blood over the incision is normal).     Redness, swelling, foul smell around incision or fever over 101.  Shortness of breath, Coughing up Bloody Sputum or Pains or Swelling in your calves.  Persistent pain or nausea not relieved by medication.  If vaginal bleeding is in excess of a normal period.  Problems urinating    If any unusual problems or difficulties occur contact your doctor. If you cannot contact your doctor but feel your signs and symptoms warrant a physicians attention return to the emergency room.       ANESTHESIA  -For the first 24 hours after surgery:  Do not drive, use heavy equipment, make important decisions, or drink alcohol  -It is normal to feel sleepy for several hours.  Rest until you are more awake.  -Have someone stay with you, if needed.  They can watch for problems and  help keep you safe.  -Some possible post anesthesia side effects include: nausea and vomiting, sore throat and hoarseness, sleepiness, and dizziness.    PAIN  -If you have pain after surgery, pain medicine will help you feel better.  Take it as directed, before pain becomes severe.  Most pain relievers taken by mouth need at least 20-30 minutes to start working.  -Do not drive or drink alcohol while taking pain medicine.  -Pain medication can upset your stomach.  Taking them with a little food may help.  -Other ways to help control pain: elevation, ice, and relaxation  -Call your surgeon if still having unmanageable pain an hour after taking pain medicine.  -Pain medicine can cause constipation.  Taking an over-the counter stool softener while on prescription pain medicine and drinking plenty of fluids can prevent this side effect.  -Call your surgeon if you have severe side effects like: breathing problems, trouble waking up, dizziness, confusion, or severe constipation.    NAUSEA  -Some people have nausea after surgery.  This is often because of anesthesia, pain, pain medicine, or the stress of surgery.  -Do not push yourself to eat.  Start off with clear liquids and soup.  Slowly move to solid foods.  Don't eat fatty, rich, spicy foods at first.  Eat smaller amounts.  -If you develop persistent nausea and vomiting please notify your surgeon immediately.    BLEEDING  -Different types of surgery require different types of care and dressing changes.  It is important to follow all instructions and advice from your surgeon.  Change dressing as directed.  Call your surgeon for any concerns regarding postop bleeding.    SIGNS OF INFECTION  -Signs of infection include: fever, swelling, drainage, and redness  -Notify your surgeon if you have a fever of 100.4 F (38.0 C) or higher.  -Notify your surgeon if you notice redness, swelling, increased pain, pus, or a foul smell at the incision site.

## 2023-09-06 NOTE — DISCHARGE SUMMARY
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  Her recovery was uncomplicated and by post-op day 0 she was tolerating PO without N/V, ambulating without difficulty, her pain was well controlled with PO pain medications and she had passed flatus. She was stable and ready for discharge.   Medications: OTC ibuprofen, Percocet  Follow up: in 2 week in clinic to inspect incision   Instructions: Keep incision clean and dry,  continue ambulation, take medications as needed and take stool softener as needed, pelvic rest until instructed otherwise by physician  Call or return to ED for fever >100.4, foul smelling vaginal discharge, heavy vaginal bleeding, abdominal pain not responsive to medications or other concerns.

## 2023-09-06 NOTE — INTERVAL H&P NOTE
The patient has been examined and the H&P has been reviewed:    I concur with the findings and no changes have occurred since H&P was written.    Surgery risks, benefits and alternative options discussed and understood by patient/family. She has noticed a boil on the right labia, inspected and is a 1cm area of tenderness and fluctuations. Discussed risks, including bleeding, pain, infection, scarring and  benefits and will proceed with I&D when under anesthesia.           There are no hospital problems to display for this patient.

## 2023-09-07 NOTE — ANESTHESIA POSTPROCEDURE EVALUATION
Anesthesia Post Evaluation    Patient: Jose Hernandez    Procedure(s) Performed: Procedure(s) (LRB):  CONE BIOPSY, CERVIX, USING COLD KNIFE (N/A)  INCISION AND DRAINAGE, VULVAR ABSCESS (Left)    Final Anesthesia Type: general      Patient location during evaluation: PACU  Patient participation: Yes- Able to Participate  Level of consciousness: awake and alert  Post-procedure vital signs: reviewed and stable  Pain management: adequate  Airway patency: patent    PONV status at discharge: No PONV  Anesthetic complications: no      Cardiovascular status: blood pressure returned to baseline  Respiratory status: unassisted  Hydration status: euvolemic            Vitals Value Taken Time   /75 09/06/23 1551   Temp 36.5 °C (97.7 °F) 09/06/23 1551   Pulse 86 09/06/23 1551   Resp 18 09/06/23 1551   SpO2 100 % 09/06/23 1551         Event Time   Out of Recovery 14:50:00         Pain/Stacy Score: Pain Rating Prior to Med Admin: 6 (9/6/2023  2:31 PM)  Stacy Score: 10 (9/6/2023  3:52 PM)

## 2023-09-11 LAB
FINAL PATHOLOGIC DIAGNOSIS: NORMAL
GROSS: NORMAL
Lab: NORMAL

## 2023-09-25 ENCOUNTER — OFFICE VISIT (OUTPATIENT)
Dept: OBSTETRICS AND GYNECOLOGY | Facility: CLINIC | Age: 33
End: 2023-09-25
Payer: MEDICAID

## 2023-09-25 VITALS
HEIGHT: 62 IN | WEIGHT: 228.75 LBS | DIASTOLIC BLOOD PRESSURE: 76 MMHG | BODY MASS INDEX: 42.1 KG/M2 | SYSTOLIC BLOOD PRESSURE: 123 MMHG

## 2023-09-25 DIAGNOSIS — N89.8 VAGINAL DISCHARGE: ICD-10-CM

## 2023-09-25 DIAGNOSIS — Z98.890 H/O LEEP: Primary | ICD-10-CM

## 2023-09-25 PROCEDURE — 99024 PR POST-OP FOLLOW-UP VISIT: ICD-10-PCS | Mod: ,,, | Performed by: OBSTETRICS & GYNECOLOGY

## 2023-09-25 PROCEDURE — 3008F BODY MASS INDEX DOCD: CPT | Mod: CPTII,,, | Performed by: OBSTETRICS & GYNECOLOGY

## 2023-09-25 PROCEDURE — 3074F PR MOST RECENT SYSTOLIC BLOOD PRESSURE < 130 MM HG: ICD-10-PCS | Mod: CPTII,,, | Performed by: OBSTETRICS & GYNECOLOGY

## 2023-09-25 PROCEDURE — 3078F PR MOST RECENT DIASTOLIC BLOOD PRESSURE < 80 MM HG: ICD-10-PCS | Mod: CPTII,,, | Performed by: OBSTETRICS & GYNECOLOGY

## 2023-09-25 PROCEDURE — 3008F PR BODY MASS INDEX (BMI) DOCUMENTED: ICD-10-PCS | Mod: CPTII,,, | Performed by: OBSTETRICS & GYNECOLOGY

## 2023-09-25 PROCEDURE — 99999 PR PBB SHADOW E&M-EST. PATIENT-LVL III: CPT | Mod: PBBFAC,,, | Performed by: OBSTETRICS & GYNECOLOGY

## 2023-09-25 PROCEDURE — 1159F PR MEDICATION LIST DOCUMENTED IN MEDICAL RECORD: ICD-10-PCS | Mod: CPTII,,, | Performed by: OBSTETRICS & GYNECOLOGY

## 2023-09-25 PROCEDURE — 1159F MED LIST DOCD IN RCRD: CPT | Mod: CPTII,,, | Performed by: OBSTETRICS & GYNECOLOGY

## 2023-09-25 PROCEDURE — 3051F PR MOST RECENT HEMOGLOBIN A1C LEVEL 7.0 - < 8.0%: ICD-10-PCS | Mod: CPTII,,, | Performed by: OBSTETRICS & GYNECOLOGY

## 2023-09-25 PROCEDURE — 3074F SYST BP LT 130 MM HG: CPT | Mod: CPTII,,, | Performed by: OBSTETRICS & GYNECOLOGY

## 2023-09-25 PROCEDURE — 81514 NFCT DS BV&VAGINITIS DNA ALG: CPT | Performed by: OBSTETRICS & GYNECOLOGY

## 2023-09-25 PROCEDURE — 99024 POSTOP FOLLOW-UP VISIT: CPT | Mod: ,,, | Performed by: OBSTETRICS & GYNECOLOGY

## 2023-09-25 PROCEDURE — 99213 OFFICE O/P EST LOW 20 MIN: CPT | Mod: PBBFAC,PO | Performed by: OBSTETRICS & GYNECOLOGY

## 2023-09-25 PROCEDURE — 99999 PR PBB SHADOW E&M-EST. PATIENT-LVL III: ICD-10-PCS | Mod: PBBFAC,,, | Performed by: OBSTETRICS & GYNECOLOGY

## 2023-09-25 PROCEDURE — 3051F HG A1C>EQUAL 7.0%<8.0%: CPT | Mod: CPTII,,, | Performed by: OBSTETRICS & GYNECOLOGY

## 2023-09-25 PROCEDURE — 3078F DIAST BP <80 MM HG: CPT | Mod: CPTII,,, | Performed by: OBSTETRICS & GYNECOLOGY

## 2023-09-25 NOTE — PROGRESS NOTES
CC: s/p Leep     HPI:   Jose Hernandez is a 32 y.o. here for f/u  leep  on 2 weeks ago. She reports normal bowel movements and urination. Pain is controlled with OTC medications.      Vitals:    09/25/23 0931   BP: 123/76       PHYSICAL EXAM:   APPEARANCE: Well nourished, well developed, in no acute distress.    PELVIC:   VULVA: No lesions. Normal female genitalia.  URETHRAL MEATUS: Normal size and location, no lesions, no prolapse.  URETHRA: No masses, tenderness, prolapse or scarring.  VAGINA: Moist and well rugated, scant white discharge discharge, no significant cystocele or rectocele.  CERVIX: No lesions and discharge. Well healing cone bed  UTERUS: Normal size, regular shape, mobile, non-tender, bladder base nontender.  ADNEXA: No masses or tenderness.    Pathology:   Pathologic Diagnosis 1. Cervical LEEP cone biopsy shows areas of moderate to severe squamous dysplasia (SYD-2-3) with endocervical gland extension.  Dysplasia does not involve the inked edges of this LEEP cone biopsy.     2. Endocervical curettage shows fragments of benign endocervical glandular epithelium admixed with blood clot and mucus, no dysplasia identified.        1. H/O LEEP    2. Vaginal discharge          PLAN:   1. Ok to go back to normal activities in 2 weeks, well healing, RTC 6 months for repeat pap smear.   2. Getting HPV vaccine

## 2023-09-27 ENCOUNTER — PATIENT MESSAGE (OUTPATIENT)
Dept: OBSTETRICS AND GYNECOLOGY | Facility: HOSPITAL | Age: 33
End: 2023-09-27
Payer: MEDICAID

## 2023-09-27 ENCOUNTER — PATIENT MESSAGE (OUTPATIENT)
Dept: OBSTETRICS AND GYNECOLOGY | Facility: CLINIC | Age: 33
End: 2023-09-27
Payer: MEDICAID

## 2023-09-27 LAB
BACTERIAL VAGINOSIS DNA: POSITIVE
CANDIDA GLABRATA DNA: NEGATIVE
CANDIDA KRUSEI DNA: NEGATIVE
CANDIDA RRNA VAG QL PROBE: NEGATIVE
T VAGINALIS RRNA GENITAL QL PROBE: NEGATIVE

## 2023-09-27 RX ORDER — METRONIDAZOLE 500 MG/1
500 TABLET ORAL EVERY 12 HOURS
Qty: 14 TABLET | Refills: 0 | Status: SHIPPED | OUTPATIENT
Start: 2023-09-27 | End: 2023-10-04

## 2023-10-04 ENCOUNTER — OFFICE VISIT (OUTPATIENT)
Dept: GASTROENTEROLOGY | Facility: CLINIC | Age: 33
End: 2023-10-04
Payer: MEDICAID

## 2023-10-04 ENCOUNTER — TELEPHONE (OUTPATIENT)
Dept: GASTROENTEROLOGY | Facility: CLINIC | Age: 33
End: 2023-10-04

## 2023-10-04 DIAGNOSIS — K21.9 GASTROESOPHAGEAL REFLUX DISEASE, UNSPECIFIED WHETHER ESOPHAGITIS PRESENT: ICD-10-CM

## 2023-10-04 DIAGNOSIS — R68.81 EARLY SATIETY: ICD-10-CM

## 2023-10-04 DIAGNOSIS — E66.01 CLASS 3 SEVERE OBESITY DUE TO EXCESS CALORIES WITH BODY MASS INDEX (BMI) OF 40.0 TO 44.9 IN ADULT, UNSPECIFIED WHETHER SERIOUS COMORBIDITY PRESENT: ICD-10-CM

## 2023-10-04 DIAGNOSIS — R19.4 CHANGE IN BOWEL HABIT: ICD-10-CM

## 2023-10-04 DIAGNOSIS — R19.8 IRREGULAR BOWEL HABITS: ICD-10-CM

## 2023-10-04 DIAGNOSIS — R10.13 EPIGASTRIC PAIN: Primary | ICD-10-CM

## 2023-10-04 PROCEDURE — 1159F MED LIST DOCD IN RCRD: CPT | Mod: CPTII,95,, | Performed by: NURSE PRACTITIONER

## 2023-10-04 PROCEDURE — 99214 OFFICE O/P EST MOD 30 MIN: CPT | Mod: 95,,, | Performed by: NURSE PRACTITIONER

## 2023-10-04 PROCEDURE — 1160F PR REVIEW ALL MEDS BY PRESCRIBER/CLIN PHARMACIST DOCUMENTED: ICD-10-PCS | Mod: CPTII,95,, | Performed by: NURSE PRACTITIONER

## 2023-10-04 PROCEDURE — 1159F PR MEDICATION LIST DOCUMENTED IN MEDICAL RECORD: ICD-10-PCS | Mod: CPTII,95,, | Performed by: NURSE PRACTITIONER

## 2023-10-04 PROCEDURE — 3051F HG A1C>EQUAL 7.0%<8.0%: CPT | Mod: CPTII,95,, | Performed by: NURSE PRACTITIONER

## 2023-10-04 PROCEDURE — 1160F RVW MEDS BY RX/DR IN RCRD: CPT | Mod: CPTII,95,, | Performed by: NURSE PRACTITIONER

## 2023-10-04 PROCEDURE — 99214 PR OFFICE/OUTPT VISIT, EST, LEVL IV, 30-39 MIN: ICD-10-PCS | Mod: 95,,, | Performed by: NURSE PRACTITIONER

## 2023-10-04 PROCEDURE — 3051F PR MOST RECENT HEMOGLOBIN A1C LEVEL 7.0 - < 8.0%: ICD-10-PCS | Mod: CPTII,95,, | Performed by: NURSE PRACTITIONER

## 2023-10-04 RX ORDER — OMEPRAZOLE 40 MG/1
40 CAPSULE, DELAYED RELEASE ORAL DAILY
Qty: 30 CAPSULE | Refills: 2 | Status: SHIPPED | OUTPATIENT
Start: 2023-10-04 | End: 2024-10-03

## 2023-10-04 NOTE — PROGRESS NOTES
Subjective:       Patient ID: Jose Hernandez is a 32 y.o. female.    Chief Complaint: Abdominal Pain    The patient location is: [place], [place]  The chief complaint leading to consultation is: abdominal pain    Visit type: audiovisual    Face to Face time with patient: 20 minutes  45 minutes of total time spent on the encounter, which includes face to face time and non-face to face time preparing to see the patient (eg, review of tests), Obtaining and/or reviewing separately obtained history, Documenting clinical information in the electronic or other health record, Independently interpreting results (not separately reported) and communicating results to the patient/family/caregiver, or Care coordination (not separately reported).         Each patient to whom he or she provides medical services by telemedicine is:  (1) informed of the relationship between the physician and patient and the respective role of any other health care provider with respect to management of the patient; and (2) notified that he or she may decline to receive medical services by telemedicine and may withdraw from such care at any time.    Notes:     33 y/o female with T2DM presents for virtual follow up with c/o abdominal pain. Has intermittent upper abdominal pain, nausea, and bloating especially after eating. Feels full after eating small amounts. No unintentional weight loss. Frequent heartburn and reflux not relieved by current dose omeprazole 20 mg. Has alternating constipation and diarrhea. Has tried probiotics without relief. Denies chronic NSAID use. No FH IBD, stomach or colon cancer.             Past Medical History:   Diagnosis Date    Diabetes mellitus, type 2        Past Surgical History:   Procedure Laterality Date    COLD KNIFE CONIZATION OF CERVIX N/A 9/6/2023    Procedure: CONE BIOPSY, CERVIX, USING COLD KNIFE;  Surgeon: Tammy Martinez MD;  Location: [facility];  Service: OB/GYN;  Laterality: N/A;    INCISION AND  DRAINAGE OF GENITAL LABIA Left 9/6/2023    Procedure: INCISION AND DRAINAGE, VULVAR ABSCESS;  Surgeon: Tammy Martinez MD;  Location: [facility] OR;  Service: OB/GYN;  Laterality: Left;       Family History   Problem Relation Age of Onset    Breast cancer Paternal Aunt     Colon cancer Neg Hx     Ovarian cancer Neg Hx        Social History     Socioeconomic History    Marital status: Single   Tobacco Use    Smoking status: Every Day     Current packs/day: 1.00     Average packs/day: 1 pack/day for 18.0 years (18.0 ttl pk-yrs)     Types: Cigarettes    Smokeless tobacco: Never    Tobacco comments:     12/21/21 in smoking program    Substance and Sexual Activity    Alcohol use: Yes     Comment: occasionally    Drug use: Never    Sexual activity: Not Currently     Partners: Male     Comment: sometimes       Review of Systems   Constitutional:  Negative for appetite change and unexpected weight change.   HENT:  Negative for trouble swallowing.    Respiratory:  Negative for shortness of breath.    Cardiovascular:  Negative for chest pain.   Gastrointestinal:  Positive for abdominal pain, constipation, diarrhea and nausea.   Genitourinary:  Negative for dysuria.   Hematological:  Negative for adenopathy.   Psychiatric/Behavioral:  Negative for dysphoric mood.          Objective:     There were no vitals filed for this visit.       Physical Exam  Constitutional:       General: She is not in acute distress.     Appearance: She is obese.   HENT:      Head: Normocephalic.   Eyes:      Conjunctiva/sclera: Conjunctivae normal.   Pulmonary:      Effort: Pulmonary effort is normal. No respiratory distress.   Musculoskeletal:      Cervical back: Normal range of motion.   Skin:     Coloration: Skin is not jaundiced or pale.   Neurological:      Mental Status: She is alert and oriented to person, place, and time.   Psychiatric:         Mood and Affect: Mood normal.         Behavior: Behavior normal.               Assessment:          ICD-10-CM ICD-9-CM   1. Epigastric pain  R10.13 789.06   2. Early satiety  R68.81 780.94   3. Gastroesophageal reflux disease, unspecified whether esophagitis present  K21.9 530.81   4. Irregular bowel habits  R19.8 569.89   5. Class 3 severe obesity due to excess calories with body mass index (BMI) of 40.0 to 44.9 in adult, unspecified whether serious comorbidity present  E66.01 278.01    Z68.41 V85.41       Plan:       Epigastric pain  -     Case Request Endoscopy: EGD (ESOPHAGOGASTRODUODENOSCOPY)  -     omeprazole (PRILOSEC) 40 MG capsule; Take 1 capsule (40 mg total) by mouth once daily.  Dispense: 30 capsule; Refill: 2    Early satiety  -     Case Request Endoscopy: EGD (ESOPHAGOGASTRODUODENOSCOPY)  -     NM Gastric Emptying; Future; Expected date: 10/04/2023    Gastroesophageal reflux disease, unspecified whether esophagitis present  -     Case Request Endoscopy: EGD (ESOPHAGOGASTRODUODENOSCOPY)  -     omeprazole (PRILOSEC) 40 MG capsule; Take 1 capsule (40 mg total) by mouth once daily.  Dispense: 30 capsule; Refill: 2    Irregular bowel habits         -     Fiber supplement daily    Class 3 severe obesity due to excess calories with body mass index (BMI) of 40.0 to 44.9 in adult, unspecified whether serious comorbidity present          -    Weight loss advised. Dietary and exercise           counseling done.      Follow up if symptoms worsen or fail to improve.     Patient's Medications   New Prescriptions    OMEPRAZOLE (PRILOSEC) 40 MG CAPSULE    Take 1 capsule (40 mg total) by mouth once daily.   Previous Medications    HUMIRA,CF, PEN 40 MG/0.4 ML PNKT    Inject 40 mg as directed every 7 days.    METFORMIN (GLUCOPHAGE) 500 MG TABLET    Take 500 mg by mouth once daily.   Modified Medications    No medications on file   Discontinued Medications    ATORVASTATIN (LIPITOR) 20 MG TABLET    Take 20 mg by mouth once daily.    DOXYCYCLINE (VIBRAMYCIN) 100 MG CAP    Take 100 mg by mouth 2 (two) times daily.    HPV  VACCINE,9-YASMIN (GARDASIL 9, PF,) 0.5 ML SYRG    To be given by [facility]    IBUPROFEN (ADVIL,MOTRIN) 800 MG TABLET    Take 1 tablet (800 mg total) by mouth every 8 (eight) hours as needed for Pain.    MEDROXYPROGESTERONE (PROVERA) 10 MG TABLET    Take 1 tablet (10 mg total) by mouth once daily. for 10 days    MELOXICAM (MOBIC) 15 MG TABLET    Take 15 mg by mouth.    METRONIDAZOLE (FLAGYL) 500 MG TABLET    Take 1 tablet (500 mg total) by mouth every 12 (twelve) hours. for 7 days    OMEPRAZOLE (PRILOSEC) 20 MG CAPSULE    Take 20 mg by mouth once daily.    OXYCODONE-ACETAMINOPHEN (PERCOCET) 5-325 MG PER TABLET    Take 1 tablet by mouth every 4 (four) hours as needed for Pain.

## 2023-10-04 NOTE — PATIENT INSTRUCTIONS
- Take omeprazole 40 mg every morning on an empty stomach 30-45 minutes before food or any other medications (if not covered by insurance, may use [employer] for discount)  - OTC fiber supplement such as Citrucel or Metamucil capsules daily      Transportation: Call you insurance for transportation benefits or [facility] [employer]: [phone]      EGD Prep Instructions    [facility] [address] [place]  [address]    You are scheduled for an EGD with Dr. Gandara on 10/24/2023 at [facility].  You will enter through the [facility] entrance and check in at Same Day Surgery.    Start a CLEAR LIQUID DIET at 5 pm ONE DAY BEFORE YOUR PROCEDURE.  This means no solid food after 5 pm.   CLEAR LIQUID DIET:   - Avoid Red, Orange, Purple, and/or Blue food coloring   - NO DAIRY   - You can have:  Coffee with sugar (no creamer), tea, water, soda, apple or white grape juice, chicken or beef broth/bouillon (no meat, noodles, or veggies), green/yellow popsicles, green/yellow Jell-O, lemonade.     Nothing to eat or drink after midnight before the procedure.  You MAY brush your teeth.    You MAY take your blood pressure, heart, and seizure medication on the morning of the procedure, with a SIP of water.  Hold ALL other medications until after the procedure.    You must have someone with you to DRIVE YOU HOME since you will be receiving IV sedation for the procedure.    If you are on blood thinners THAT YOU HAVE BEEN INSTRUCTED TO HOLD BY YOUR DOCTOR FOR THIS PROCEDURE, then do NOT take this the morning of your EGD.  Do NOT stop these medications on your own, they must be approved to be held by your doctor.  Your EGD can NOT be done if you are on these medications.  Examples of blood thinners include: Coumadin, Aggrenox, Plavix, Pradaxa, Reapro, Pletal, Xarelto, Ticagrelor, Brilinta, Eliquis, and high dose aspirin (325 mg).  You do not have to stop baby aspirin 81 mg.    You  will receive a call the afternoon before your EGD to tell you the time to arrive.  If you have not received a call by the day before your procedure, call the Pre-op Coordinator at [phone].

## 2023-10-04 NOTE — TELEPHONE ENCOUNTER
Patient GES scheduled for 10/11/2023.    ----- Message from RENETTA Wright sent at 10/4/2023  1:23 PM CDT -----  Schedule GES.

## 2023-10-11 ENCOUNTER — TELEPHONE (OUTPATIENT)
Dept: GASTROENTEROLOGY | Facility: CLINIC | Age: 33
End: 2023-10-11
Payer: MEDICAID

## 2023-10-11 ENCOUNTER — HOSPITAL ENCOUNTER (OUTPATIENT)
Dept: RADIOLOGY | Facility: HOSPITAL | Age: 33
Discharge: HOME | End: 2023-10-11
Attending: NURSE PRACTITIONER
Payer: MEDICAID

## 2023-10-11 DIAGNOSIS — R68.81 EARLY SATIETY: ICD-10-CM

## 2023-10-11 PROCEDURE — 78264 NM GASTRIC EMPTYING: ICD-10-PCS | Mod: 26,,, | Performed by: RADIOLOGY

## 2023-10-11 PROCEDURE — 78264 GASTRIC EMPTYING IMG STUDY: CPT | Mod: 26,,, | Performed by: RADIOLOGY

## 2023-10-11 PROCEDURE — A9541 TC99M SULFUR COLLOID: HCPCS

## 2023-10-11 NOTE — TELEPHONE ENCOUNTER
Patient informed of test results. Updated EGD instructions sent via portal.    ----- Message from RENETTA Wright sent at 10/11/2023  2:01 PM CDT -----  Please inform study did show delayed gastric emptying consistent with gastroparesis. Advised patient to: eat foods low in fat and fiber, eat five or six small, nutritious meals a day instead of two or three large meals, chew your food thoroughly, eat soft, well-cooked foods, avoid carbonated, or fizzy, beverages, and avoid alcohol.    Send new instructions for EGD. She will clear liquids the entire day before EGD then nothing by mouth after midnight.     Will follow up in clinic after EGD.

## 2023-10-19 ENCOUNTER — TELEPHONE (OUTPATIENT)
Dept: GASTROENTEROLOGY | Facility: CLINIC | Age: 33
End: 2023-10-19
Payer: MEDICAID

## 2023-10-23 ENCOUNTER — TELEPHONE (OUTPATIENT)
Dept: GASTROENTEROLOGY | Facility: CLINIC | Age: 33
End: 2023-10-23
Payer: MEDICAID

## 2023-10-23 NOTE — TELEPHONE ENCOUNTER
Instructed pt on arrival time of 0900am for procedure scheduled Tuesday. instructed pt to be on clear liquids the entire day Monday. Confirmed pt on clear liquids today. Pt verbalized understanding.

## 2024-04-15 ENCOUNTER — PATIENT MESSAGE (OUTPATIENT)
Dept: GASTROENTEROLOGY | Facility: CLINIC | Age: 34
End: 2024-04-15
Payer: MEDICAID

## 2024-04-24 ENCOUNTER — HOSPITAL ENCOUNTER (EMERGENCY)
Facility: HOSPITAL | Age: 34
Discharge: HOME | End: 2024-04-24
Attending: EMERGENCY MEDICINE
Payer: MEDICAID

## 2024-04-24 VITALS
DIASTOLIC BLOOD PRESSURE: 89 MMHG | HEART RATE: 91 BPM | TEMPERATURE: 98 F | RESPIRATION RATE: 17 BRPM | SYSTOLIC BLOOD PRESSURE: 168 MMHG | OXYGEN SATURATION: 100 %

## 2024-04-24 DIAGNOSIS — T14.8XXA ABRASION: ICD-10-CM

## 2024-04-24 DIAGNOSIS — G89.11 ACUTE TRAUMATIC PAIN: ICD-10-CM

## 2024-04-24 DIAGNOSIS — S46.912A STRAIN OF LEFT SHOULDER, INITIAL ENCOUNTER: ICD-10-CM

## 2024-04-24 DIAGNOSIS — S50.02XA CONTUSION OF LEFT ELBOW, INITIAL ENCOUNTER: ICD-10-CM
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
## 2024-04-24 DIAGNOSIS — S60.222A CONTUSION OF LEFT HAND, INITIAL ENCOUNTER: ICD-10-CM

## 2024-04-24 DIAGNOSIS — M25.562 ACUTE PAIN OF LEFT KNEE: ICD-10-CM

## 2024-04-24 LAB
B-HCG UR QL: NEGATIVE
CTP QC/QA: YES

## 2024-04-24 PROCEDURE — 99284 EMERGENCY DEPT VISIT MOD MDM: CPT | Mod: 25

## 2024-04-24 PROCEDURE — 81025 URINE PREGNANCY TEST: CPT | Performed by: PHYSICIAN ASSISTANT

## 2024-04-24 RX ORDER — NAPROXEN 500 MG/1
500 TABLET ORAL 2 TIMES DAILY WITH MEALS
Qty: 20 TABLET | Refills: 0 | Status: SHIPPED | OUTPATIENT
Start: 2024-04-24

## 2024-04-25 NOTE — ED NOTES
Review of patient's allergies indicates:  No Known Allergies     Patient has verified the spelling of their name and  on armband.   APPEARANCE: Patient is alert, calm, oriented x 4, and does not appear distressed.  SKIN: Skin is normal for race, warm, and dry. Normal skin turgor and mucous membranes moist.  CARDIAC: Normal rate and rhythm, no murmur heard.   RESPIRATORY:Normal rate and effort. Breath sounds clear bilaterally throughout chest. Respirations are equal and unlabored.    GASTRO: Bowel sounds normal, abdomen is soft, no tenderness, and no abdominal distention.  MUSCLE: Full ROM. No bony tenderness or soft tissue tenderness. No obvious deformity. +pain felt to left hand, arm, elbow, leg  NEURO: 5/5 strength major flexors/extensors bilaterally. Sensory intact to light touch bilaterally. [place] coma scale: eyes open spontaneously-4, oriented & converses-5, obeys commands-6. No neurological abnormalities.   MENTAL STATUS: awake, alert and aware of environment.  : Voids without complication
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
## 2024-07-01 ENCOUNTER — TELEPHONE (OUTPATIENT)
Dept: OBSTETRICS AND GYNECOLOGY | Facility: CLINIC | Age: 34
End: 2024-07-01
Payer: MEDICAID

## 2024-07-01 NOTE — TELEPHONE ENCOUNTER
Called patient and discussed an appointment time that worked best for her.       ----- Message from Shy Davis sent at 7/1/2024  9:08 AM CDT -----  Regarding: Appt  Type:  Sooner Apoointment Request    Caller is requesting a sooner appointment.  Caller declined first available appointment listed below.  Caller will not accept being placed on the waitlist and is requesting a message be sent to doctor.  Name of Caller:Jose  When is the first available appointment? N/a  Symptoms:  Would the patient rather a call back or a response via [facility]? Call  Best Call Back Number:[phone]  Additional Information: Patient would like to be seen before Tuesday. Patient is going out of town and she needs a repeat pap

## 2024-07-28 ENCOUNTER — PATIENT MESSAGE (OUTPATIENT)
Dept: OBSTETRICS AND GYNECOLOGY | Facility: CLINIC | Age: 34
End: 2024-07-28
Payer: MEDICAID

## 2024-08-19 ENCOUNTER — PATIENT MESSAGE (OUTPATIENT)
Dept: OBSTETRICS AND GYNECOLOGY | Facility: CLINIC | Age: 34
End: 2024-08-19

## 2024-08-19 ENCOUNTER — OFFICE VISIT (OUTPATIENT)
Dept: OBSTETRICS AND GYNECOLOGY | Facility: CLINIC | Age: 34
End: 2024-08-19
Payer: MEDICAID

## 2024-08-19 VITALS
SYSTOLIC BLOOD PRESSURE: 112 MMHG | BODY MASS INDEX: 42.8 KG/M2 | HEART RATE: 87 BPM | DIASTOLIC BLOOD PRESSURE: 77 MMHG | WEIGHT: 234 LBS

## 2024-08-19 DIAGNOSIS — Z3A.01 7 WEEKS GESTATION OF PREGNANCY: Primary | ICD-10-CM

## 2024-08-19 DIAGNOSIS — Z12.4 PAP SMEAR FOR CERVICAL CANCER SCREENING: ICD-10-CM

## 2024-08-19 DIAGNOSIS — F17.200 SMOKER: ICD-10-CM

## 2024-08-19 DIAGNOSIS — E11.9 TYPE 2 DIABETES MELLITUS WITHOUT COMPLICATION, WITHOUT LONG-TERM CURRENT USE OF INSULIN: ICD-10-CM

## 2024-08-19 DIAGNOSIS — Z32.00 POSSIBLE PREGNANCY, NOT YET CONFIRMED: ICD-10-CM

## 2024-08-19 PROCEDURE — 3008F BODY MASS INDEX DOCD: CPT | Mod: CPTII,,, | Performed by: OBSTETRICS & GYNECOLOGY

## 2024-08-19 PROCEDURE — 99204 OFFICE O/P NEW MOD 45 MIN: CPT | Mod: S$PBB,TH,, | Performed by: OBSTETRICS & GYNECOLOGY

## 2024-08-19 PROCEDURE — 3078F DIAST BP <80 MM HG: CPT | Mod: CPTII,,, | Performed by: OBSTETRICS & GYNECOLOGY

## 2024-08-19 PROCEDURE — 3074F SYST BP LT 130 MM HG: CPT | Mod: CPTII,,, | Performed by: OBSTETRICS & GYNECOLOGY

## 2024-08-19 PROCEDURE — 87086 URINE CULTURE/COLONY COUNT: CPT | Performed by: OBSTETRICS & GYNECOLOGY

## 2024-08-19 PROCEDURE — 1159F MED LIST DOCD IN RCRD: CPT | Mod: CPTII,,, | Performed by: OBSTETRICS & GYNECOLOGY

## 2024-08-19 PROCEDURE — 81514 NFCT DS BV&VAGINITIS DNA ALG: CPT | Performed by: OBSTETRICS & GYNECOLOGY

## 2024-08-19 PROCEDURE — 99999 PR PBB SHADOW E&M-EST. PATIENT-LVL III: CPT | Mod: PBBFAC,,, | Performed by: OBSTETRICS & GYNECOLOGY

## 2024-08-19 PROCEDURE — 99213 OFFICE O/P EST LOW 20 MIN: CPT | Mod: PBBFAC,PO | Performed by: OBSTETRICS & GYNECOLOGY

## 2024-08-19 PROCEDURE — 87591 N.GONORRHOEAE DNA AMP PROB: CPT | Performed by: OBSTETRICS & GYNECOLOGY

## 2024-08-19 PROCEDURE — 87491 CHLMYD TRACH DNA AMP PROBE: CPT | Performed by: OBSTETRICS & GYNECOLOGY

## 2024-08-19 RX ORDER — DEXTROSE 4 G
1 TABLET,CHEWABLE ORAL ONCE
Qty: 1 EACH | Refills: 0 | Status: SHIPPED | OUTPATIENT
Start: 2024-08-19 | End: 2024-08-19

## 2024-08-19 NOTE — TELEPHONE ENCOUNTER
Refill Routing Note   Medication(s) are not appropriate for processing by [facility] for the following reason(s):        Med affordability  Clarification of medication (Rx) detailsPharmacy comment: Alternative Requested:DX CODE REQUIRED PER MEDICAID.     [facility] action(s):  Route               Appointments  past 12m or future 3m with PCP    Date Provider   Last Visit   8/19/2024 Tammy Martinez MD   Next Visit   Visit date not found Tammy Martinez MD   ED visits in past 90 days: 0        Note composed:1:52 PM 08/19/2024

## 2024-08-19 NOTE — PROGRESS NOTES
CC: positive pregnancy test     HPI:   Jose Garcia Mary 33 y.o.  at 7 1/7 wga by US yesterday is here for + UPT. She hasn't seen anyone yet for this pregnancy. She has no complaints.     OB history: 2 c-sections, 1st at term for FTP; 2nd reports she was told early her son had an umbilical cord issue and at 32 weeks was found to have low fluid so did repeat      Patient's last menstrual period was 2024 (exact date).     Past Medical History:   Diagnosis Date    Diabetes mellitus, type 2     Hidradenitis suppurativa        Past Surgical History:   Procedure Laterality Date    COLD KNIFE CONIZATION OF CERVIX N/A 2023    Procedure: CONE BIOPSY, CERVIX, USING COLD KNIFE;  Surgeon: Tammy Martinez MD;  Location: [facility] OR;  Service: OB/GYN;  Laterality: N/A;    INCISION AND DRAINAGE OF GENITAL LABIA Left 2023    Procedure: INCISION AND DRAINAGE, VULVAR ABSCESS;  Surgeon: Tammy Martinez MD;  Location: [facility] OR;  Service: OB/GYN;  Laterality: Left;       Family History   Problem Relation Name Age of Onset    Breast cancer Paternal Aunt      Colon cancer Neg Hx      Ovarian cancer Neg Hx         Social History     Socioeconomic History    Marital status: Single   Tobacco Use    Smoking status: Every Day     Current packs/day: 1.00     Average packs/day: 1 pack/day for 18.0 years (18.0 ttl pk-yrs)     Types: Cigarettes    Smokeless tobacco: Never    Tobacco comments:     21 in smoking program    Substance and Sexual Activity    Alcohol use: Not Currently     Comment: occasionally    Drug use: Never    Sexual activity: Not Currently     Partners: Male     Comment: sometimes       OB History          2    Para   2    Term   1       1    AB        Living   2         SAB        IAB        Ectopic        Multiple        Live Births                      COMPREHENSIVE GYN HISTORY:  PAP History: has abn; had leep for SYD 2-3 2023  Infection History: Denies STDs. Denies PID.  Benign  History: Denies uterine fibroids. Denies ovarian cysts. Denies endometriosis.   Cancer History: Denies cervical cancer. Denies uterine cancer or hyperplasia. Denies ovarian cancer. Denies vulvar cancer or pre-cancer. Denies vaginal cancer or pre-cancer. Denies breast cancer. Denies colon cancer.  Sexual Activity History:   reports that she is not currently sexually active and has had partner(s) who are male.         ROS:  Negative     PE:   /77   Pulse 87   Wt 106.1 kg (234 lb)   LMP 06/26/2024 (Exact Date)   BMI 42.80 kg/m²     APPEARANCE: Well nourished, well developed, in no acute distress.    PELVIC:   VULVA: No lesions. Normal female genitalia.  URETHRAL MEATUS: Normal size and location, no lesions, no prolapse.  URETHRA: No masses, tenderness, prolapse or scarring.  VAGINA: Moist and well rugated, no discharge, no significant cystocele or rectocele.  CERVIX: No lesions and discharge.  UTERUS: 7 wk size, regular shape, mobile, non-tender, bladder base nontender.  ADNEXA: No masses or tenderness.        1. 7 weeks gestation of pregnancy    2. Possible pregnancy, not yet confirmed    3. Type 2 diabetes mellitus without complication, without long-term current use of insulin    4. Pap smear for cervical cancer screening        Plan:    1. IOB labs and dating US ordered, PNV continue.   2 see back in 4 weeks.   3. Discussed with patient the way the office works. That we are 1/2 men and women. I will do my best to be there at the delivery but if I can't make it that there would be another physician there who could care for them.   4. Type 2 DM: Patient reports never formally dx with type 2 dm, does say DM in her chart, no A1C to review, will get A1c today and start doing log   -has had eye exam recently   5. AMA: - Counseled on the risk between maternal age and genetic aneuploidy, including down syndrome.   -We discussed the risks and benefits of screening tests versus definitive genetic testing ([facility] and  amniocentesis).  We discussed the limitations of ultrasound in the definitive diagnosis of Down Syndrome and other conditions.  I also discussed with the patient the option of non-invasive prenatal  testing, such as Nt/first trimester screening, Quad screen, Materni T21. She desires MT21. - Will get AFP when >15wga.   - Will get f/u growth scan at 32-34 wga    6. Hidradenitis suppurativa: on humara, discussed limited data, not likely harmful but does cross placenta and is some studies that show higher rate though unclear if related to the disease themselves. Her dermatologist paused it but this is the only thing that seems to control the HS. Discussed if decide to continue it then would defer baby's Immunizations    7. Leep: pap smear done today, will do CL at 16 weeks   8. H/o  x2   9. Smoking: is cutting down and down form 1 pack to 1/3 pack per day. discussed risk associated to pregnancy. Discussed limited data on nicotine substitute like gums ect and medications. Referred to smoking cessation program        Face to Face time with patient: 45 minutes of total time spent on the encounter, which includes face to face time and non-face to face time preparing to see the patient (eg, review of tests), Obtaining and/or reviewing separately obtained history, Documenting clinical information in the electronic or other health record, Independently interpreting results (not separately reported) and communicating results to the patient/family/caregiver, or Care coordination (not separately reported).

## 2024-08-20 ENCOUNTER — PATIENT MESSAGE (OUTPATIENT)
Dept: OBSTETRICS AND GYNECOLOGY | Facility: CLINIC | Age: 34
End: 2024-08-20
Payer: MEDICAID

## 2024-08-20 ENCOUNTER — HOSPITAL ENCOUNTER (OUTPATIENT)
Dept: RADIOLOGY | Facility: HOSPITAL | Age: 34
Discharge: HOME | End: 2024-08-20
Attending: OBSTETRICS & GYNECOLOGY
Payer: MEDICAID

## 2024-08-20 DIAGNOSIS — Z3A.01 7 WEEKS GESTATION OF PREGNANCY: ICD-10-CM

## 2024-08-20 PROCEDURE — 76817 TRANSVAGINAL US OBSTETRIC: CPT | Mod: TC

## 2024-08-20 PROCEDURE — 76801 OB US < 14 WKS SINGLE FETUS: CPT | Mod: 26,,, | Performed by: INTERNAL MEDICINE

## 2024-08-20 PROCEDURE — 76817 TRANSVAGINAL US OBSTETRIC: CPT | Mod: 26,,, | Performed by: INTERNAL MEDICINE

## 2024-08-20 PROCEDURE — 76801 OB US < 14 WKS SINGLE FETUS: CPT | Mod: TC

## 2024-08-20 RX ORDER — LANCETS 33 GAUGE
EACH MISCELLANEOUS
Qty: 200 EACH | Refills: 3 | Status: SHIPPED | OUTPATIENT
Start: 2024-08-20

## 2024-08-20 RX ORDER — CALCIUM CITRATE/VITAMIN D3 200MG-6.25
TABLET ORAL
Qty: 200 STRIP | Refills: 3 | Status: SHIPPED | OUTPATIENT
Start: 2024-08-20 | End: 2024-08-21

## 2024-08-20 NOTE — TELEPHONE ENCOUNTER
Refill Routing Note   Medication(s) are not appropriate for processing by [facility] for the following reason(s):        Clarification of medication (Rx) details: pharmacy requesting for ICD10 code    [facility] action(s):  Defer               Appointments  past 12m or future 3m with PCP    Date Provider   Last Visit   8/19/2024 Tammy Martinez MD   Next Visit   Visit date not found Tammy Martinez MD   ED visits in past 90 days: 0        Note composed:6:59 PM 08/20/2024

## 2024-08-21 ENCOUNTER — PATIENT MESSAGE (OUTPATIENT)
Dept: OBSTETRICS AND GYNECOLOGY | Facility: CLINIC | Age: 34
End: 2024-08-21
Payer: MEDICAID

## 2024-08-21 DIAGNOSIS — Z3A.01 7 WEEKS GESTATION OF PREGNANCY: ICD-10-CM

## 2024-08-21 DIAGNOSIS — E11.9 TYPE 2 DIABETES MELLITUS WITHOUT COMPLICATION, WITHOUT LONG-TERM CURRENT USE OF INSULIN: Primary | ICD-10-CM

## 2024-08-21 LAB
BACTERIA UR CULT: NORMAL
C TRACH DNA SPEC QL NAA+PROBE: NOT DETECTED
N GONORRHOEA DNA SPEC QL NAA+PROBE: NOT DETECTED

## 2024-08-21 RX ORDER — LANCETS 33 GAUGE
1 EACH MISCELLANEOUS 4 TIMES DAILY
Qty: 200 EACH | Refills: 3 | Status: SHIPPED | OUTPATIENT
Start: 2024-08-21

## 2024-08-21 RX ORDER — CALCIUM CITRATE/VITAMIN D3 200MG-6.25
TABLET ORAL
Qty: 200 STRIP | Refills: 3 | Status: SHIPPED | OUTPATIENT
Start: 2024-08-21

## 2024-08-21 RX ORDER — DEXTROSE 4 G
1 TABLET,CHEWABLE ORAL ONCE
Qty: 1 EACH | Refills: 0 | Status: SHIPPED | OUTPATIENT
Start: 2024-08-21 | End: 2024-08-23

## 2024-08-21 NOTE — TELEPHONE ENCOUNTER
Pls set up appt in 2 weeks for ob f/u. I know she was going out of town so if that is the case then please set one up for when she comes back.

## 2024-08-23 ENCOUNTER — PATIENT MESSAGE (OUTPATIENT)
Dept: OBSTETRICS AND GYNECOLOGY | Facility: CLINIC | Age: 34
End: 2024-08-23
Payer: MEDICAID

## 2024-09-03 ENCOUNTER — TELEPHONE (OUTPATIENT)
Dept: OBSTETRICS AND GYNECOLOGY | Facility: CLINIC | Age: 34
End: 2024-09-03
Payer: MEDICAID

## 2024-09-04 ENCOUNTER — TELEPHONE (OUTPATIENT)
Dept: OBSTETRICS AND GYNECOLOGY | Facility: CLINIC | Age: 34
End: 2024-09-04
Payer: MEDICAID

## 2024-09-04 NOTE — TELEPHONE ENCOUNTER
Please advise     ----- Message from Valery Salter sent at 9/3/2024  4:06 PM CDT -----  Regarding: self [phone]  Type:  Patient Returning Call    Who Called:  self     Who Left Message for Patient:  Kayla    Does the patient know what this is regarding?: schedule u/s     Would the patient rather a call back or a response via [facility]? Call back     Best Call Back Number: [phone]

## 2024-09-05 ENCOUNTER — TELEPHONE (OUTPATIENT)
Dept: OBSTETRICS AND GYNECOLOGY | Facility: CLINIC | Age: 34
End: 2024-09-05
Payer: MEDICAID

## 2024-09-05 ENCOUNTER — PATIENT MESSAGE (OUTPATIENT)
Dept: OBSTETRICS AND GYNECOLOGY | Facility: CLINIC | Age: 34
End: 2024-09-05
Payer: MEDICAID

## 2024-09-05 NOTE — TELEPHONE ENCOUNTER
Ok to discuss it at her next appointment. We typically recommend at least looking. We typically don't need to do biopsies when pregnant. No she doesn't need another ultrasound until her anatomy US.

## 2024-09-05 NOTE — TELEPHONE ENCOUNTER
Spoke with pt to get her setup for colpo appt. She wants to know if she can wait until after giving birth to have this done, explained procedure to the pt but she still is very nervous and would rather wait if possible. Also she wants to know is she due for a US around her next visit?

## 2024-09-05 NOTE — TELEPHONE ENCOUNTER
----- Message from Selena Mcmahon sent at 9/5/2024  2:03 PM CDT -----  Type:  Patient Returning Call    Who Called: Pt   Who Left Message for Patient: Mary  Does the patient know what this is regarding?: returning missed call  Would the patient rather a call back or a response via [facility]? Call   Best Call Back Number: [phone]   Additional Information:

## 2024-09-10 ENCOUNTER — TELEPHONE (OUTPATIENT)
Dept: CARDIOLOGY | Facility: CLINIC | Age: 34
End: 2024-09-10
Payer: MEDICAID

## 2024-09-10 NOTE — TELEPHONE ENCOUNTER
Spoke with patient.   Assisted patient rescheduled appointment for 11/13/24.   Patient verbalized understanding.     Cristiana LACEY       ----- Message from Pedro Ogden sent at 9/10/2024  3:01 PM CDT -----  Type:  Needs Medical Advice    Who Called: pt    Would the patient rather a call back or a response via [facility]? call  Best Call Back Number:  [phone]  Additional Information: pt would like a call regarding her schedule appointment on 10/23/2024 was unable to change appointment to a later date pt would like to reschedule to be seen on 11/06 or 11/07 please call regarding

## 2024-09-12 ENCOUNTER — PATIENT MESSAGE (OUTPATIENT)
Dept: OBSTETRICS AND GYNECOLOGY | Facility: CLINIC | Age: 34
End: 2024-09-12
Payer: MEDICAID

## 2024-09-13 ENCOUNTER — TELEPHONE (OUTPATIENT)
Dept: OBSTETRICS AND GYNECOLOGY | Facility: CLINIC | Age: 34
End: 2024-09-13
Payer: MEDICAID

## 2024-09-13 NOTE — TELEPHONE ENCOUNTER
Pt was to be scheduled for cervical length u/s at 16 weeks, which would be on 10/16/24. Pt was not able to come in that week and is scheduled for 10/23/24 at her convenience.

## 2024-09-13 NOTE — TELEPHONE ENCOUNTER
She needs all the appointments. The colposcopy is to make sure there is not any signs of precancerous cells on the cervix. The ultrasounds will make sure the length of the cervix is normal. She needs to do the measurements of the cervix with an ultrasound at 16 weeks and we can do the colposcopy whenever she would like but sooner would be better. If she wants to wait and we can talk more about it at the next appointment that is fine.

## 2024-09-13 NOTE — TELEPHONE ENCOUNTER
Please advise       ----- Message from Noeren Norman sent at 9/10/2024  3:52 PM CDT -----  Type:  Call Back     Who Called:pt    Does the patient know what this is regarding?:schedule  US  Would the patient rather a call back or a response via [facility]? Call   Best Call Back Number: [phone]  Additional Information:

## 2024-09-13 NOTE — TELEPHONE ENCOUNTER
Called patient and informed her that Dr. Martinez wanted to see her for both 16weeks and 20 weeks ultrasound.    Patient would also like to scheduled her colpo for her 16 weeks instead of her 20 weeks please advise.     ----- Message from Tresa Soler sent at 9/13/2024  8:07 AM CDT -----  Type: Patient Call Back    Who called:pt     What is the request in detail:pt calling to discuss her appts but she is confused on what kind of appts she needs and would like to speak to a nurse. She is 11 weeks pregnant. Would like to speak to nurse.     Can the clinic reply by [facility]?    Would the patient rather a call back or a response via [facility]? call    Best call back number:[phone] (home)       Additional Information:

## 2024-10-23 ENCOUNTER — PROCEDURE VISIT (OUTPATIENT)
Dept: MATERNAL FETAL MEDICINE | Facility: CLINIC | Age: 34
End: 2024-10-23
Payer: MEDICAID

## 2024-10-23 ENCOUNTER — PATIENT MESSAGE (OUTPATIENT)
Dept: OBSTETRICS AND GYNECOLOGY | Facility: HOSPITAL | Age: 34
End: 2024-10-23
Payer: MEDICAID

## 2024-10-23 ENCOUNTER — PROCEDURE VISIT (OUTPATIENT)
Dept: OBSTETRICS AND GYNECOLOGY | Facility: CLINIC | Age: 34
End: 2024-10-23
Payer: MEDICAID

## 2024-10-23 ENCOUNTER — PATIENT MESSAGE (OUTPATIENT)
Dept: MATERNAL FETAL MEDICINE | Facility: CLINIC | Age: 34
End: 2024-10-23
Payer: MEDICAID

## 2024-10-23 VITALS
SYSTOLIC BLOOD PRESSURE: 126 MMHG | WEIGHT: 235.25 LBS | DIASTOLIC BLOOD PRESSURE: 82 MMHG | BODY MASS INDEX: 43.02 KG/M2 | HEART RATE: 120 BPM

## 2024-10-23 DIAGNOSIS — Z3A.17 17 WEEKS GESTATION OF PREGNANCY: ICD-10-CM

## 2024-10-23 DIAGNOSIS — E11.9 TYPE 2 DIABETES MELLITUS WITHOUT COMPLICATION, WITHOUT LONG-TERM CURRENT USE OF INSULIN: ICD-10-CM

## 2024-10-23 DIAGNOSIS — R87.810 CERVICAL HIGH RISK HPV (HUMAN PAPILLOMAVIRUS) TEST POSITIVE: ICD-10-CM

## 2024-10-23 DIAGNOSIS — O36.8390 FETAL ARRHYTHMIA AFFECTING PREGNANCY, ANTEPARTUM: ICD-10-CM

## 2024-10-23 DIAGNOSIS — Z3A.01 7 WEEKS GESTATION OF PREGNANCY: ICD-10-CM

## 2024-10-23 DIAGNOSIS — L05.91 PILONIDAL CYST: ICD-10-CM

## 2024-10-23 DIAGNOSIS — N88.3 SHORT CERVIX: Primary | ICD-10-CM

## 2024-10-23 DIAGNOSIS — Z3A.17 17 WEEKS GESTATION OF PREGNANCY: Primary | ICD-10-CM

## 2024-10-23 DIAGNOSIS — N89.8 VAGINAL DISCHARGE: ICD-10-CM

## 2024-10-23 LAB
BILIRUB SERPL-MCNC: ABNORMAL MG/DL
BLOOD URINE, POC: ABNORMAL
CLARITY, POC UA: CLEAR
COLOR, POC UA: YELLOW
GLUCOSE UR QL STRIP: ABNORMAL
KETONES UR QL STRIP: ABNORMAL
LEUKOCYTE ESTERASE URINE, POC: ABNORMAL
NITRITE, POC UA: ABNORMAL
PH, POC UA: 6
PROTEIN, POC: ABNORMAL
SPECIFIC GRAVITY, POC UA: 1.03
UROBILINOGEN, POC UA: 1

## 2024-10-23 PROCEDURE — 76816 OB US FOLLOW-UP PER FETUS: CPT | Mod: 26,S$PBB,, | Performed by: OBSTETRICS & GYNECOLOGY

## 2024-10-23 PROCEDURE — 99999PBSHW POCT URINE DIPSTICK WITHOUT MICROSCOPE: Mod: PBBFAC,,,

## 2024-10-23 PROCEDURE — 81002 URINALYSIS NONAUTO W/O SCOPE: CPT | Mod: PBBFAC,PO | Performed by: OBSTETRICS & GYNECOLOGY

## 2024-10-23 PROCEDURE — 76817 TRANSVAGINAL US OBSTETRIC: CPT | Mod: PBBFAC,PO | Performed by: OBSTETRICS & GYNECOLOGY

## 2024-10-23 PROCEDURE — 0352U VAGINOSIS SCREEN BY DNA PROBE: CPT | Performed by: OBSTETRICS & GYNECOLOGY

## 2024-10-23 PROCEDURE — 57452 EXAM OF CERVIX W/SCOPE: CPT | Mod: PBBFAC,PO | Performed by: OBSTETRICS & GYNECOLOGY

## 2024-10-23 RX ORDER — METFORMIN HYDROCHLORIDE 500 MG/1
500 TABLET ORAL
COMMUNITY

## 2024-10-23 NOTE — TELEPHONE ENCOUNTER
Called patient and reviewed US, having lunch then coming back. Will send to [facility]. Discussed progesterone and monitoring CL and PAC. Will discuss more appt.

## 2024-10-24 ENCOUNTER — TELEPHONE (OUTPATIENT)
Dept: OBSTETRICS AND GYNECOLOGY | Facility: CLINIC | Age: 34
End: 2024-10-24
Payer: MEDICAID

## 2024-10-24 ENCOUNTER — PATIENT MESSAGE (OUTPATIENT)
Dept: OBSTETRICS AND GYNECOLOGY | Facility: CLINIC | Age: 34
End: 2024-10-24
Payer: MEDICAID

## 2024-10-24 DIAGNOSIS — N88.3 SHORT CERVIX: Primary | ICD-10-CM

## 2024-10-24 RX ORDER — PROGESTERONE 200 MG/1
200 CAPSULE ORAL DAILY
Qty: 30 CAPSULE | Refills: 11 | Status: SHIPPED | OUTPATIENT
Start: 2024-10-24 | End: 2025-10-24

## 2024-10-24 RX ORDER — ASPIRIN 81 MG/1
81 TABLET ORAL DAILY
COMMUNITY
Start: 2024-10-24 | End: 2025-07-31

## 2024-10-24 RX ORDER — CEPHALEXIN 500 MG/1
500 CAPSULE ORAL EVERY 8 HOURS
Qty: 21 CAPSULE | Refills: 0 | Status: SHIPPED | OUTPATIENT
Start: 2024-10-24 | End: 2024-10-31

## 2024-10-24 NOTE — TELEPHONE ENCOUNTER
I sent the keflex in yesterday and the progesterone just a second ago. Is there another medications that she is missing?

## 2024-10-24 NOTE — TELEPHONE ENCOUNTER
Insert vaginally short cervix please send medication that was discussed in visit on yesterday.       ----- Message from Noreen sent at 10/24/2024  2:43 PM CDT -----  Type:  Call Back     Who Called:pt     Does the patient know what this is regarding?:new Prescription   Would the patient rather a call back or a response via [facility]? Call   Best Call Back Number: [phone]  Additional Information:

## 2024-10-24 NOTE — TELEPHONE ENCOUNTER
Spoke to patient about concerns.       ----- Message from Pedro sent at 10/24/2024 12:57 PM CDT -----  Type:  Needs Medical Advice    Who Called: Pt  Would the patient rather a call back or a response via [facility]? call  Best Call Back Number: [phone]  Additional Information: Pt would like a call from nurse in office regarding a medication that has not been called in to pharmacy please call Urgent

## 2024-10-24 NOTE — PROCEDURES
Jose Hernandez is a 33 y.o. female patient.    Pulse: (!) 120 (10/23/24 1401)  BP: 126/82 (10/23/24 1401)  Weight: 106.7 kg (235 lb 3.7 oz) (10/23/24 1401)       Colposcopy    Date/Time: 10/23/2024 2:00 PM    Performed by: Tammy Martinez MD  Authorized by: Tammy Martinez MD    Assistants?: No      Colposcopy Site:  Cervix  Position:  Supine  Acrowhite Lesion: No    Atypical Vessels? Yes    Transformation Zone Adequate?: Yes    Biopsy?: No    ECC Performed?: No    LEEP Performed?: No    Estimated blood loss (cc):  0   Patient tolerated the procedure well with no immediate complications.   Post-operative instructions were provided for the patient.   Patient was discharged and will follow up if any complications occur      10/24/2024

## 2024-10-24 NOTE — PROGRESS NOTES
She is doing ok, had to go to ER to have a pilonidal cyst lanced and feels like it is coming back. Having a vaginal discharge and unsure if yeast resolved   See colpo exam, affrim done   @ 17 wga  IOB labs done    Type 2 DM: Patient reports never formally dx with type 2 dm  1st trimester A1C: 7.1; ordered for 2nd trimester   Didn't bring log book, reports recorded them for a week and had a few in the 400s but then started eating better and they were normal but doesn't remember exact numbers and hasn't been recording them lately so didn't send a log; we discussed how important this is in pregnancy and complications related to diabetes can be still birth, miscarriage, birth defects and heart defects, macrosomia,  hypoglycemia; will see next week to review log; discussed if gets BS >300 then needs to come to ER    She would be open to a continuous glucose monitor, will refer to endocrinology to see if we can get one approved; referral placed.   Will get peds echo, echo and EKG for mom, has cardiology appt   Recommend start ASA now   Refer to MFM   AMA: - Counseled on the risk between maternal age and genetic aneuploidy, including down syndrome, desires MT21   - Will get AFP when >15wga.   - Will get f/u growth scan at 32-34 wga     Hidradenitis suppurativa: was on humara,    Leep with HPV + pap smear   Colpo done in 1st trimester normal    doing CL at 16 weeks    H/o  x2   Smoking: is cutting down and down form 1 pack to 1/3 pack per day. discussed risk associated to pregnancy. Discussed limited data on nicotine substitute like gums ect and medications. Referred to smoking cessation program   Short cervix  Discussed recommendation and risks/benefits for vaginal progesterone and she would like to start it   Cervix closed on exam today, discussed weekly US at this point and f/u with MFM   Fetal PAC: MFM recommend twice weekly monitoring of fetal heart tones   Previous pregnancy with oligo and delivery at  32 weeks (records from 2017 that can see suggest fetal growth restriction with velamentous cord insertion, needs sign auth to see records, will get next visit)    -she reports [place] here [place] which is why we haven't seen her in a while. She was planning on leaving to go back there next week and doesn't know if she can make appointments next week. Discussed that I would not recommend her leaving or if she had to leave then should establish care there so can continue to monitor cervix and diabetes. Discussed how important this is and that if cervix opens or shortens more then may recommend a cerclage and the only way to know would be to monitor. She was understandably upset and is going to think about it and let us know what she would like to do. Discussed that we really need to see each other or MFM weekly until diabetes is well controled. Endocrinology referral placed as likely would be better with her schedule to have a CGM. She is going to let me know if she is going to stay in town. If leaves then will need to help manage things via portal, can't do virtual visits when out of state.     Face to Face time with patient: 45 minutes of total time spent on the encounter, which includes face to face time and non-face to face time preparing to see the patient (eg, review of tests), Obtaining and/or reviewing separately obtained history, Documenting clinical information in the electronic or other health record, Independently interpreting results (not separately reported) and communicating results to the patient/family/caregiver, or Care coordination (not separately reported).

## 2024-10-24 NOTE — TELEPHONE ENCOUNTER
----- Message from Pedro sent at 10/24/2024 12:57 PM CDT -----  Type:  Needs Medical Advice    Who Called: Pt  Would the patient rather a call back or a response via [facility]? call  Best Call Back Number: [phone]  Additional Information: Pt would like a call from nurse in office regarding a medication that has not been called in to pharmacy please call Urgent

## 2024-10-26 LAB
BACTERIAL VAGINOSIS DNA: NOT DETECTED
CANDIDA GLABRATA/KRUSEI: NOT DETECTED
CANDIDA RRNA VAG QL PROBE: NOT DETECTED
TRICHOMONAS VAGINALIS: NOT DETECTED

## 2024-10-27 ENCOUNTER — PATIENT MESSAGE (OUTPATIENT)
Dept: OBSTETRICS AND GYNECOLOGY | Facility: HOSPITAL | Age: 34
End: 2024-10-27
Payer: MEDICAID

## 2024-11-14 ENCOUNTER — LAB VISIT (OUTPATIENT)
Dept: LAB | Facility: HOSPITAL | Age: 34
End: 2024-11-14
Attending: OBSTETRICS & GYNECOLOGY
Payer: MEDICAID

## 2024-11-14 ENCOUNTER — PATIENT MESSAGE (OUTPATIENT)
Dept: OBSTETRICS AND GYNECOLOGY | Facility: HOSPITAL | Age: 34
End: 2024-11-14
Payer: MEDICAID

## 2024-11-14 ENCOUNTER — ROUTINE PRENATAL (OUTPATIENT)
Dept: OBSTETRICS AND GYNECOLOGY | Facility: CLINIC | Age: 34
End: 2024-11-14
Payer: MEDICAID

## 2024-11-14 ENCOUNTER — OFFICE VISIT (OUTPATIENT)
Dept: MATERNAL FETAL MEDICINE | Facility: CLINIC | Age: 34
End: 2024-11-14
Payer: MEDICAID

## 2024-11-14 ENCOUNTER — PROCEDURE VISIT (OUTPATIENT)
Dept: MATERNAL FETAL MEDICINE | Facility: CLINIC | Age: 34
End: 2024-11-14
Payer: MEDICAID

## 2024-11-14 VITALS
SYSTOLIC BLOOD PRESSURE: 100 MMHG | WEIGHT: 232.81 LBS | HEIGHT: 62 IN | DIASTOLIC BLOOD PRESSURE: 64 MMHG | BODY MASS INDEX: 42.84 KG/M2

## 2024-11-14 VITALS
WEIGHT: 232.56 LBS | BODY MASS INDEX: 42.54 KG/M2 | SYSTOLIC BLOOD PRESSURE: 104 MMHG | DIASTOLIC BLOOD PRESSURE: 70 MMHG | HEART RATE: 61 BPM

## 2024-11-14 DIAGNOSIS — N88.3 SHORT CERVIX: ICD-10-CM

## 2024-11-14 DIAGNOSIS — O09.522 MULTIGRAVIDA OF ADVANCED MATERNAL AGE IN SECOND TRIMESTER: ICD-10-CM

## 2024-11-14 DIAGNOSIS — Z3A.17 17 WEEKS GESTATION OF PREGNANCY: ICD-10-CM

## 2024-11-14 DIAGNOSIS — O99.332 TOBACCO USE AFFECTING PREGNANCY IN SECOND TRIMESTER, ANTEPARTUM: ICD-10-CM

## 2024-11-14 DIAGNOSIS — F17.200 SMOKING: ICD-10-CM

## 2024-11-14 DIAGNOSIS — Z3A.20 20 WEEKS GESTATION OF PREGNANCY: Primary | ICD-10-CM

## 2024-11-14 DIAGNOSIS — Z36.89 ENCOUNTER FOR ULTRASOUND TO CHECK FETAL GROWTH: Primary | ICD-10-CM

## 2024-11-14 DIAGNOSIS — O26.872 CERVICAL SHORTENING AFFECTING PREGNANCY IN SECOND TRIMESTER: ICD-10-CM

## 2024-11-14 DIAGNOSIS — O99.210 OBESITY IN PREGNANCY: ICD-10-CM

## 2024-11-14 DIAGNOSIS — O36.8390 FETAL ARRHYTHMIA AFFECTING PREGNANCY, ANTEPARTUM: ICD-10-CM

## 2024-11-14 DIAGNOSIS — F31.60 BIPOLAR 1 DISORDER, MIXED: ICD-10-CM

## 2024-11-14 DIAGNOSIS — E11.9 TYPE 2 DIABETES MELLITUS WITHOUT COMPLICATION, WITHOUT LONG-TERM CURRENT USE OF INSULIN: ICD-10-CM

## 2024-11-14 DIAGNOSIS — O99.332 MATERNAL TOBACCO USE IN SECOND TRIMESTER: ICD-10-CM

## 2024-11-14 DIAGNOSIS — Z98.891 HISTORY OF C-SECTION: ICD-10-CM

## 2024-11-14 DIAGNOSIS — O24.112 PREGNANCY WITH TYPE 2 DIABETES MELLITUS IN SECOND TRIMESTER: Primary | ICD-10-CM

## 2024-11-14 DIAGNOSIS — O24.112 PRE-EXISTING TYPE 2 DIABETES MELLITUS DURING PREGNANCY IN SECOND TRIMESTER: ICD-10-CM

## 2024-11-14 DIAGNOSIS — Z30.09 UNWANTED FERTILITY: ICD-10-CM

## 2024-11-14 LAB
BILIRUB SERPL-MCNC: ABNORMAL MG/DL
BLOOD URINE, POC: ABNORMAL
CLARITY, POC UA: CLEAR
COLOR, POC UA: YELLOW
ESTIMATED AVG GLUCOSE: 163 MG/DL (ref 68–131)
GLUCOSE UR QL STRIP: ABNORMAL
HBA1C MFR BLD: 7.3 % (ref 4–5.6)
KETONES UR QL STRIP: ABNORMAL
LEUKOCYTE ESTERASE URINE, POC: ABNORMAL
NITRITE, POC UA: ABNORMAL
PH, POC UA: 6
PROTEIN, POC: >=300
SPECIFIC GRAVITY, POC UA: >=1.03
UROBILINOGEN, POC UA: 0.2

## 2024-11-14 PROCEDURE — 99999 PR PBB SHADOW E&M-EST. PATIENT-LVL III: CPT | Mod: PBBFAC,,, | Performed by: OBSTETRICS & GYNECOLOGY

## 2024-11-14 PROCEDURE — 99213 OFFICE O/P EST LOW 20 MIN: CPT | Mod: PBBFAC,TH,PO | Performed by: OBSTETRICS & GYNECOLOGY

## 2024-11-14 PROCEDURE — 99999PBSHW POCT URINE DIPSTICK WITHOUT MICROSCOPE: Mod: PBBFAC,,,

## 2024-11-14 PROCEDURE — 76811 OB US DETAILED SNGL FETUS: CPT | Mod: PBBFAC,PO | Performed by: OBSTETRICS & GYNECOLOGY

## 2024-11-14 PROCEDURE — 83036 HEMOGLOBIN GLYCOSYLATED A1C: CPT | Performed by: OBSTETRICS & GYNECOLOGY

## 2024-11-14 PROCEDURE — 99205 OFFICE O/P NEW HI 60 MIN: CPT | Mod: S$PBB,TH,, | Performed by: OBSTETRICS & GYNECOLOGY

## 2024-11-14 PROCEDURE — 3074F SYST BP LT 130 MM HG: CPT | Mod: CPTII,,, | Performed by: OBSTETRICS & GYNECOLOGY

## 2024-11-14 PROCEDURE — 3078F DIAST BP <80 MM HG: CPT | Mod: CPTII,,, | Performed by: OBSTETRICS & GYNECOLOGY

## 2024-11-14 PROCEDURE — 3008F BODY MASS INDEX DOCD: CPT | Mod: CPTII,,, | Performed by: OBSTETRICS & GYNECOLOGY

## 2024-11-14 PROCEDURE — 82105 ALPHA-FETOPROTEIN SERUM: CPT | Performed by: OBSTETRICS & GYNECOLOGY

## 2024-11-14 PROCEDURE — 81002 URINALYSIS NONAUTO W/O SCOPE: CPT | Mod: PBBFAC,PO | Performed by: OBSTETRICS & GYNECOLOGY

## 2024-11-14 PROCEDURE — 36415 COLL VENOUS BLD VENIPUNCTURE: CPT | Performed by: OBSTETRICS & GYNECOLOGY

## 2024-11-14 PROCEDURE — 99213 OFFICE O/P EST LOW 20 MIN: CPT | Mod: PBBFAC,27,TH,PO | Performed by: OBSTETRICS & GYNECOLOGY

## 2024-11-14 PROCEDURE — 76811 OB US DETAILED SNGL FETUS: CPT | Mod: 26,S$PBB,, | Performed by: OBSTETRICS & GYNECOLOGY

## 2024-11-14 PROCEDURE — 3051F HG A1C>EQUAL 7.0%<8.0%: CPT | Mod: CPTII,,, | Performed by: OBSTETRICS & GYNECOLOGY

## 2024-11-14 RX ORDER — METFORMIN HYDROCHLORIDE 1000 MG/1
1000 TABLET ORAL 2 TIMES DAILY WITH MEALS
Qty: 180 TABLET | Refills: 3 | Status: SHIPPED | OUTPATIENT
Start: 2024-11-14 | End: 2025-11-14

## 2024-11-14 NOTE — PROGRESS NOTES
She is doing ok, feeling discouraged about her blood sugars.     @ 20 2/7 wga  IOB labs done   Consents done   Type 2 DM: Patient reports never formally dx with type 2 dm  1st trimester A1C: 7.1;   2nd trimester: 7.3  Reviewed log, all fastings 110-120, only a few after meals, most elevated, one as high as 200.    On metformin 500 BID; discussed starting insulin and she would like to try to increase metformin first, will go to 1000 BID and send me log next week so we can review. Discussed that I suspect that she will really need to start insulin at some point.   She would be open to a continuous glucose monitor, will refer to endocrinology to see if we can get one approved; referral placed.   Will get peds echo,appt set up 12/3,  echo and EKG for mom, has cardiology appt   Recommend start ASA now   Seeing MFM   AMA:   - did AFP today   - did MT21 today       Hidradenitis suppurativa: was on humara,    Leep with HPV + pap smear   Colpo done in 1st trimester normal    doing CL at 16 weeks    H/o  x2   Consents signed for repeat , she is considering doing TOLAC, discussed risks and benefits, including risk of uterine rupture, she will think about it    Smoking: is cutting down and down form 1 pack to 1/3 pack per day. Encouraged to continue  Short cervix  Doing vaginal progesterone   Understands recommendations to do Q2 weeks CL, she will be out of town when due for next check so likely will not do any more   Fetal PAC:   Has decreased caffeine, understand recommendations for more frequent fetal monitoring however not able to come more frequently since stays out of town most of the time.    delivery: Previous pregnancy with oligo and delivery at 32 weeks (records from 2017 that can see suggest fetal growth restriction with velamentous cord insertion, needs sign auth to see records, will get next visit)  Undesired fertility: medicaid consent signed   Low lying placenta: repeat US set up          Face to Face time with patient: 25 minutes of total time spent on the encounter, which includes face to face time and non-face to face time preparing to see the patient (eg, review of tests), Obtaining and/or reviewing separately obtained history, Documenting clinical information in the electronic or other health record, Independently interpreting results (not separately reported) and communicating results to the patient/family/caregiver, or Care coordination (not separately reported).

## 2024-11-15 PROBLEM — O36.8390 FETAL ARRHYTHMIA AFFECTING PREGNANCY, ANTEPARTUM: Status: ACTIVE | Noted: 2024-11-15

## 2024-11-15 PROBLEM — F17.200 SMOKING: Status: ACTIVE | Noted: 2024-11-15

## 2024-11-15 PROBLEM — Z98.891 HISTORY OF C-SECTION: Status: ACTIVE | Noted: 2024-11-15

## 2024-11-15 PROBLEM — N88.3 SHORT CERVIX: Status: ACTIVE | Noted: 2024-11-15

## 2024-11-15 PROBLEM — Z30.09 UNWANTED FERTILITY: Status: ACTIVE | Noted: 2024-11-15

## 2024-11-18 LAB
# FETUSES US: NORMAL
AFP INTERPRETATION: NORMAL
AFP MOM SERPL: 0.82
AFP SERPL-MCNC: 44.7 NG/ML
AFP SERPL-MCNC: NEGATIVE NG/ML
AGE AT DELIVERY: 36
GA (DAYS): 1 D
GA (WEEKS): 20 WK
GESTATIONAL AGE METHOD: NORMAL
IDDM PATIENT QL: NORMAL
SMOKING STATUS FTND: YES

## 2024-11-19 PROBLEM — O09.522 MULTIGRAVIDA OF ADVANCED MATERNAL AGE IN SECOND TRIMESTER: Status: ACTIVE | Noted: 2024-11-19

## 2024-11-19 PROBLEM — O99.210 OBESITY IN PREGNANCY: Status: ACTIVE | Noted: 2024-11-19

## 2024-11-19 PROBLEM — O26.872 CERVICAL SHORTENING AFFECTING PREGNANCY IN SECOND TRIMESTER: Status: ACTIVE | Noted: 2024-11-15

## 2024-11-19 PROBLEM — O99.332 TOBACCO USE AFFECTING PREGNANCY IN SECOND TRIMESTER, ANTEPARTUM: Status: ACTIVE | Noted: 2024-11-19

## 2024-11-19 NOTE — ASSESSMENT & PLAN NOTE
DM Type II   -Metformin 500 mg bid   -Labs:    24:  Plts 374k   Cr 0.6   AST/ALT 24: hb electrophoresis normal     24:  Plts 378k  Hb A1c 7.1%    Pregestational Diabetes  Counseling:  Risks of diabetes in pregnancy include but are not limited to an increased risk of , preeclampsia/gestational hypertension, fetal structural anomalies, macrosomia, prematurity, shoulder dystocia/birth injury,  hyperbilirubinemia and electrolyte issues, pulmonary immaturity and sudden stillbirth especially in those patients with poor glucose control. There is the need for increased fetal surveillance with serial fetal growth assessments and third trimester fetal testing. I also reviewed the possibility of need for early delivery in those with poor glucose control or evidence of fetal growth abnormalities.    Recommendations for Primary OB Management:  Baseline evaluation (to be ordered by primary OB provider):  24 hour urine protein or urine P/C ratio, CBC, CMP  Maternal EKG; echocardiogram if BMI > 30 or EKG is abnormal  Maternal ophthalmic exam (if hasn't been performed in last year) and podiatry exam  Hemoglobin A1C every trimester  Diabetic education referral and counseling re: goal blood sugars (< 95 mg/dl for fasting, < 120 mg/dl for 2 hour postprandial)  Continue to check blood sugars 4x daily  Fasting and 2-hour postprandial glucose monitoring.   Goals of fasting levels below 95 mg/dL and postprandial levels below 120 mg/dL.   Weekly assessment of glucose log with Primary OB:  MFM available if need assistance   Medications:   Continue  low dose aspirin 81 mg daily for preeclampsia risk reduction  1 mg folic acid daily  Metformin at this time:   Insulin is the gold standard during pregnancy but oral hypoglycemic agents area an alternative medication   Ultrasounds:  Ultrasound and RT MFM MD Visit scheduled for  24  Serial growth ultrasounds q 4-6 weeks at 26-28 weeks  A fetal  echocardiogram is recommended at 22-24 weeks with pediatric cardiology  Scheduled for 24  Prenatal testing  Twice weekly BPP/ NST + AFV starting at 32 weeks. (Should be ordered and arranged by the patient's primary OB provider).    Delivery timin 0/7 to 38 and 6/7 weeks if under good control without comorbidities  37 0/7 to 37 and 6/7 weeks if longstanding diabetes or poorly controlled, polyhydramnios, EFW>90th percentile, or BMI >= 40  36 0/7 to 37 and 6/7 weeks if vascular complications, prior stillbirth or other complicating conditions.  Recommend consideration of earlier delivery if IUGR, HTN, or other complications  Recommend offering  for delivery is EFW is 4500g or more near the time of delivery    Insulin management during labor and delivery  Give usual dose of intermediate acting (NPH) or long-acting insulin at bedtime  Hold morning dose of insulin or reduce to ½ dose   Patients who require insulin should be scheduled as the first available (7 am or 7:30 am)  given the need for NPO status  Check glucose every 2-4 hours in latent labor; hourly in active labor  If blood glucose is less than 70 mg/dl, change IVF to D5 ½ NS at rate of 100-150 cc/hr and monitor blood glucose hourly   IV infusion of regular insulin is recommended if glucose levels exceed 120 mg/dl    Postpartum management  Insulin should be reduced by 1/2 of the pre-delivery dose within the first 6-24 hours following delivery.  Patients who were well-controlled on oral regimens can often return to these following delivery.  Patients who are breastfeeding should be advised to have small snacks before breastfeeding to reduce the risk of hypoglycemia.  Patients should be referred to primary MD or Endocrinology for postpartum management.    The patient to be advised to call for blood sugars less than 70 or greater than 200 prior to her next appointment. She was also advised that if she notes nausea/vomiting, inability to  tolerate PO, or concerns about being able to take her insulin that she should contact her provider or present to the OB ED.

## 2024-11-19 NOTE — ASSESSMENT & PLAN NOTE
Tobacco use in pregnancy   Lives with a smoker     Tobacco abuse  Patient counseled on cessation of tobacco use and avoidance of second hand smoke inhalation for duration of the pregnancy and postpartum period secondary to the associated fetal/ risks that include the following: spontaneous , placental abruption, low birth weight, oligohydramnios, non-reassuring fetal status, and sudden infant death syndrome (SIDS). Patient's questions were answered.

## 2024-11-19 NOTE — ASSESSMENT & PLAN NOTE
Obesity in pregnancy   BMI 42     Obesity  The  risks associated with obesity include an increased risk of hypertension, preeclampsia, gestational diabetes, venous thromboembolic disease,  delivery, macrosomia (with resultant shoulder dystocia, obstetric sphincter injury), IUFD, longer first stage of labor, decreased TOLAC success rate, emergent & scheduled  & complications of  (prolonged OR time, delayed delivery, excessive EBL, infection, wound separation/infection, higher spinal failure rate, more difficult intubation).  Obesity is also associated with fetal anomalies, specifically neural tube defects.  Studies have shown that the rate of complication increases with rising BMI and thus pregnancies with maternal morbid obesity are at increased risk.      Recommendations for Primary OB Management:  Recommend TWG goal is 11-20 lbs  Screen for signs/symptoms of obstructive sleep apnea  Continue low dose aspirin 81 mg daily for preeclampsia risk reduction  Targeted anatomical survey scheduled at 18-20 weeks: performed today   Follow up ultrasound and RT JACOB MD Visit 24  Fetal growth ultrasound at around 32 weeks if BMI >= 40  Lovenox 40 mg BID for VTE prophylaxis while admitted to the hospital (antepartum or postpartum) if BMI >= 40.   Encouraged breastfeeding  Postpartum lifestyle modifications & weight loss

## 2024-11-19 NOTE — ASSESSMENT & PLAN NOTE
Cervical length shortening   Prometrium 200 mg intravaginal nightly     Today: ultrasound cervical length range: (25.7 - 28.6) mm       10-23-24: cervical length was 24.7 cm    Recommendations for Primary OB:  -recommend continued intravaginal prometrium 200 mg night not proceeding beyond 36w 6d gestation     -recommend follow up ultrasound weekly not proceeding beyond 22w 6d gestation         Ordering/facilitation per Primary OB

## 2024-11-19 NOTE — ASSESSMENT & PLAN NOTE
AMA    Advanced Maternal Age (second trimester)  Today I counseled the patient on the relationship between maternal age and fetal aneuploidy.  We discussed the risks and benefits of screening tests versus definitive genetic testing (amniocentesis). We discussed the limitations of ultrasound in the definitive diagnosis of fetal aneuploidy.  I quoted the patient a 1 in 900 procedure related risk of fetal loss with genetic amniocentesis.  We also discussed cell free fetal DNA screening including the sensitivity and specificity of the test.  Her questions were answered and after today's consultation she declined pursuit of amniocentesis. IF patient desires cell free DNA screen, Primary OB to assist in pursuit and follow up of results.     Additionally, we discussed the association between advanced maternal age (AMA) and preeclampsia, gestational diabetes, and fetal growth restriction.    Recommendations for Primary OB Management:  Ultrasound and RT MFM MD Visit scheduled for 12-12-24  Follow-up fetal ultrasound at 32-34 weeks for interval fetal growth  Recommend continued dose aspirin  for preeclampsia risk reduction

## 2024-11-19 NOTE — ASSESSMENT & PLAN NOTE
Bipolar 1 disorder/mixed  Anxiety disorder     Recommendations for Primary OB Management:  -recommend patient be followed by Mental health provider/Psychiatry during pregnancy and postpartum period

## 2024-11-19 NOTE — PROGRESS NOTES
MATERNAL-FETAL MEDICINE   CONSULT NOTE    Provider requesting consultation: Dr. Tammy Martinez     Patient accompanied by FOB/patient's partner (Nael)    SUBJECTIVE:     Ms. Jose Hernandez is a 33 y.o.  female with IUP at 20w1d who is seen in consultation by MFM:   Problem   Multigravida of Advanced Maternal Age in Second Trimester   Tobacco Use Affecting Pregnancy in Second Trimester, Antepartum   Obesity in Pregnancy   Cervical Shortening Affecting Pregnancy in Second Trimester   Pre-Existing Type 2 Diabetes Mellitus During Pregnancy in Second Trimester   Bipolar 1 Disorder, Mixed   Fetal PAC's     Epic and Viewpoint notes reviewed        Medication List with Changes/Refills   New Medications    METFORMIN (GLUCOPHAGE) 1000 MG TABLET    Take 1 tablet (1,000 mg total) by mouth 2 (two) times daily with meals.   Current Medications    ASPIRIN (ECOTRIN) 81 MG EC TABLET    Take 1 tablet (81 mg total) by mouth once daily.    BLOOD SUGAR DIAGNOSTIC STRP    1 strip by Misc.(Non-Drug; Combo Route) route 4 (four) times daily.    BLOOD-GLUCOSE METER MISC    1 each by Misc.(Non-Drug; Combo Route) route once. for 1 dose    LANCETS 33 GAUGE MISC    1 lancet  by Misc.(Non-Drug; Combo Route) route 4 (four) times daily.    PROGESTERONE (PROMETRIUM) 200 MG CAPSULE    Place 1 capsule (200 mg total) vaginally once daily.    TRUEPLUS LANCETS 33 GAUGE MISC    USE 4 TIMES A DAY   Changed and/or Refilled Medications    Modified Medication Previous Medication    BLOOD SUGAR DIAGNOSTIC (TRUE METRIX GLUCOSE TEST STRIP) STRP TRUE METRIX GLUCOSE TEST STRIP Strp       1 each by Other route 4 (four) times daily.    USE 1 STRIP TO TEST BLOOD SUGAR 4 TIMES A DAY   Discontinued Medications    HUMIRA,CF, PEN 40 MG/0.4 ML PNKT    Inject 40 mg as directed every 7 days.       Review of patient's allergies indicates:  No Known Allergies    PMH:  Past Medical History:   Diagnosis Date    Diabetes mellitus, type 2     Hidradenitis suppurativa   "      PObHx:  OB History    Para Term  AB Living   3 2 1 1   2   SAB IAB Ectopic Multiple Live Births           2      # Outcome Date GA Lbr Kev/2nd Weight Sex Type Anes PTL Lv   3 Current            2  07 28w0d  1.276 kg (2 lb 13 oz) M CS-Unspec  N SAUL      Complications: Fetal Intolerance, Fetal growth restriction antepartum   1 Term 06 40w0d  3.09 kg (6 lb 13 oz) M CS-Unspec  N SAUL       PSH:  Past Surgical History:   Procedure Laterality Date    COLD KNIFE CONIZATION OF CERVIX N/A 2023    Procedure: CONE BIOPSY, CERVIX, USING COLD KNIFE;  Surgeon: Tammy Martinez MD;  Location: [facility] OR;  Service: OB/GYN;  Laterality: N/A;    INCISION AND DRAINAGE OF GENITAL LABIA Left 2023    Procedure: INCISION AND DRAINAGE, VULVAR ABSCESS;  Surgeon: Tammy Martinez MD;  Location: [facility] OR;  Service: OB/GYN;  Laterality: Left;       Family history:family history includes Breast cancer in her paternal aunt.    Social history: reports that she has been smoking cigarettes. She has a 18 pack-year smoking history. She has never used smokeless tobacco. She reports that she does not currently use alcohol. She reports that she does not use drugs.      Objective:   /64   Ht 5' 2" (1.575 m)   Wt 105.6 kg (232 lb 12.9 oz)   LMP 2024 (Exact Date)   BMI 42.58 kg/m²     Ultrasound performed. See viewpoint for full ultrasound report.  A detailed fetal anatomic ultrasound examination was performed for the following high risk indication: AMA, DM Type II, BMI   No fetal structural malformations are identified; however, fetal imaging is incomplete today.           FHR wnl and no PAC's/arrhythmia appreciated.   Amniotic fluid volume wnl per qualitative assessment.   Fetal size today is consistent with established gestational age.   Cervical length by by transvaginal ultrasound is stable at (25.7 - 28.6) mm.   Placental location is posterior, low lying per transvaginal ultrasound.     Patient " counseled on today's ultrasound evaluation including but not limited to above information. Patient's questions were answered.       ASSESSMENT/PLAN:     33 y.o.  female with IUP at 20w1d     Prior ultrasound with Fetal PAC's:  Today no PAC's visualized - incomplete fetal cardiac evaluation     Patient has per her report a moderate amount of caffeine intake. She was counseled on reduction of the caffeine secondary can be associated with fetal PAC's. Patient's questions were answered.     Recommendations for Primary OB:  -as per previous recommendations, Recommend  Biweekly in office FHR auscultation to assess for significant change in occasional PACs    -Peds Cards fetal echocardiogram scheduled for 24    ------------------------------------------------------------------------------------------------------------------------------------------------    Posterior Low Lying placenta    Bleeding precautions and suggest pelvic rest     Recommendations for Primary OB Management:  -32 wk ultrasound for placental location and to rule out vasa previa          Earlier as clinically indicated       -Primary OB to insure appt scheduled     -Bleeding precautions and suggest pelvic rest     ----------------------------------------------------------------------------------------------------------------------------------------------    Pre-existing type 2 diabetes mellitus during pregnancy in second trimester  DM Type II   -Metformin 500 mg bid   -Labs:    24:  Plts 374k   Cr 0.6   AST/ALT 24: hb electrophoresis normal     24:  Plts 378k  Hb A1c 7.1%    Pregestational Diabetes  Counseling:  Risks of diabetes in pregnancy include but are not limited to an increased risk of , preeclampsia/gestational hypertension, fetal structural anomalies, macrosomia, prematurity, shoulder dystocia/birth injury,  hyperbilirubinemia and electrolyte issues, pulmonary immaturity and sudden  stillbirth especially in those patients with poor glucose control. There is the need for increased fetal surveillance with serial fetal growth assessments and third trimester fetal testing. I also reviewed the possibility of need for early delivery in those with poor glucose control or evidence of fetal growth abnormalities.        Recommendations for Primary OB Management:  Baseline evaluation (to be ordered by primary OB provider):  24 hour urine protein or urine P/C ratio, CBC, CMP  Maternal EKG; echocardiogram if BMI > 30 or EKG is abnormal  Maternal ophthalmic exam (if hasn't been performed in last year) and podiatry exam  Hemoglobin A1C every trimester  Diabetic education referral and counseling re: goal blood sugars (< 95 mg/dl for fasting, < 120 mg/dl for 2 hour postprandial)  Continue to check blood sugars 4x daily  Fasting and 2-hour postprandial glucose monitoring.   Goals of fasting levels below 95 mg/dL and postprandial levels below 120 mg/dL.   Weekly assessment of glucose log with Primary OB:  MFM available if need assistance   Medications:   Continue  low dose aspirin 81 mg daily for preeclampsia risk reduction  1 mg folic acid daily  Metformin at this time:   Insulin is the gold standard during pregnancy but oral hypoglycemic agents area an alternative medication   Ultrasounds:  Ultrasound and RT MFM MD Visit scheduled for  24  Serial growth ultrasounds q 4-6 weeks at 26-28 weeks  A fetal echocardiogram is recommended at 22-24 weeks with pediatric cardiology  Scheduled for 24  Prenatal testing  Twice weekly BPP/ NST + AFV starting at 32 weeks. (Should be ordered and arranged by the patient's primary OB provider).    Delivery timin 0/7 to 38 and 6/7 weeks if under good control without comorbidities  37 0/7 to 37 and 6/7 weeks if longstanding diabetes or poorly controlled, polyhydramnios, EFW>90th percentile, or BMI >= 40  36 0/7 to 37 and 6/7 weeks if vascular complications, prior  stillbirth or other complicating conditions.  Recommend consideration of earlier delivery if IUGR, HTN, or other complications  Recommend offering  for delivery is EFW is 4500g or more near the time of delivery    Insulin management during labor and delivery  Give usual dose of intermediate acting (NPH) or long-acting insulin at bedtime  Hold morning dose of insulin or reduce to ½ dose   Patients who require insulin should be scheduled as the first available (7 am or 7:30 am)  given the need for NPO status  Check glucose every 2-4 hours in latent labor; hourly in active labor  If blood glucose is less than 70 mg/dl, change IVF to D5 ½ NS at rate of 100-150 cc/hr and monitor blood glucose hourly   IV infusion of regular insulin is recommended if glucose levels exceed 120 mg/dl    Postpartum management  Insulin should be reduced by 1/2 of the pre-delivery dose within the first 6-24 hours following delivery.  Patients who were well-controlled on oral regimens can often return to these following delivery.  Patients who are breastfeeding should be advised to have small snacks before breastfeeding to reduce the risk of hypoglycemia.  Patients should be referred to primary MD or Endocrinology for postpartum management.    The patient to be advised to call for blood sugars less than 70 or greater than 200 prior to her next appointment. She was also advised that if she notes nausea/vomiting, inability to tolerate PO, or concerns about being able to take her insulin that she should contact her provider or present to the OB ED.     ----------------------------------------------------------------------------------------------------------------------    Multigravida of advanced maternal age in second trimester  AMA    Today I counseled the patient on the relationship between maternal age and fetal aneuploidy.  We discussed the risks and benefits of screening tests versus definitive genetic testing  (amniocentesis). We discussed the limitations of ultrasound in the definitive diagnosis of fetal aneuploidy.  I quoted the patient a 1 in 900 procedure related risk of fetal loss with genetic amniocentesis.  We also discussed cell free fetal DNA screening including the sensitivity and specificity of the test.  Her questions were answered and after today's consultation she declined pursuit of amniocentesis. IF patient desires cell free DNA screen, Primary OB to assist in pursuit and follow up of results.     Additionally, we discussed the association between advanced maternal age (AMA) and preeclampsia, gestational diabetes, and fetal growth restriction.    Recommendations for Primary OB Management:  Ultrasound and RT MFM MD Visit scheduled for 24  Follow-up fetal ultrasound at 32-34 weeks for interval fetal growth  Recommend continued dose aspirin  for preeclampsia risk reduction    --------------------------------------------------------------------------------------------------------------------------    Tobacco use affecting pregnancy in second trimester, antepartum  Tobacco use in pregnancy   Lives with a smoker     Tobacco abuse  Patient counseled on cessation of tobacco use and avoidance of second hand smoke inhalation for duration of the pregnancy and postpartum period secondary to the associated fetal/ risks that include the following: spontaneous , placental abruption, low birth weight, oligohydramnios, non-reassuring fetal status, and sudden infant death syndrome (SIDS). Patient's questions were answered.     -----------------------------------------------------------------------------------------------------------------------    Obesity in pregnancy  Obesity in pregnancy   BMI 42     Obesity  The  risks associated with obesity include an increased risk of hypertension, preeclampsia, gestational diabetes, venous thromboembolic disease,  delivery, macrosomia (with  resultant shoulder dystocia, obstetric sphincter injury), IUFD, longer first stage of labor, decreased TOLAC success rate, emergent & scheduled  & complications of  (prolonged OR time, delayed delivery, excessive EBL, infection, wound separation/infection, higher spinal failure rate, more difficult intubation).  Obesity is also associated with fetal anomalies, specifically neural tube defects.  Studies have shown that the rate of complication increases with rising BMI and thus pregnancies with maternal morbid obesity are at increased risk.      Recommendations for Primary OB Management:  Recommend TWG goal is 11-20 lbs  Screen for signs/symptoms of obstructive sleep apnea  Continue low dose aspirin 81 mg daily for preeclampsia risk reduction  Targeted anatomical survey scheduled at 18-20 weeks: performed today   Follow up ultrasound and RT MFM MD Visit 24  Fetal growth ultrasound at around 32 weeks if BMI >= 40  Lovenox 40 mg BID for VTE prophylaxis while admitted to the hospital (antepartum or postpartum) if BMI >= 40.   Encouraged breastfeeding  Postpartum lifestyle modifications & weight loss     ------------------------------------------------------------------------------------------------------------------------------------------------    Cervical shortening affecting pregnancy in second trimester  Cervical length shortening   Prometrium 200 mg intravaginal nightly     Today: ultrasound cervical length range: (25.7 - 28.6) mm       10-23-24: cervical length was 24.7 cm    Recommendations for Primary OB:  -recommend continued intravaginal prometrium 200 mg night not proceeding beyond 36w 6d gestation     -recommend follow up ultrasound weekly not proceeding beyond 22w 6d gestation         Ordering/facilitation per Primary OB     ------------------------------------------------------------------------------------------------------------------------------------------------    Bipolar 1  disorder, mixed  Bipolar 1 disorder/mixed  Anxiety disorder     Recommendations for Primary OB Management:  -recommend patient be followed by Mental health provider/Psychiatry during pregnancy and postpartum period       FOLLOW UP:   See above specific conditions for detailed recommendations       60+++  minutes of total time spent on the encounter, which includes face to face time and non-face to face time preparing to see the patient (eg, review of tests), obtaining and/or reviewing separately obtained history, documenting clinical information in the electronic or other health record, independently interpreting results (not separately reported) and communicating results to the patient/family/caregiver, or care coordination (not separately reported).      Angelika Mchugh  Maternal-Fetal Medicine    Electronically Signed by Angelika Mchugh November 19, 2024

## 2024-11-21 LAB
ABOUT THE TEST: NORMAL
APPROVED BY: NORMAL
FETAL FRACTION: NORMAL
FETAL SEX RESULT: NORMAL
GESTATIONAL AGE > 9: YES
GESTATIONAL AGE: NORMAL
LAB DIRECTOR COMMENTS: NORMAL
LIMITATIONS:: NORMAL
MONOSOMY X RESULT: NOT DETECTED
NEGATIVE PREDICTIVE VALUE: NORMAL
NOTE: NORMAL
PERFORMANCE CHARACTERISTICS: NORMAL
PERFORMANCE: NORMAL
POSITIVE PREDICTIVE VALUE: NORMAL
RESULT: NEGATIVE
SERVICE CMNT 04-IMP: NORMAL
TEST METHODOLOGY:: NORMAL
TRISOMY 13 (T13): NEGATIVE
TRISOMY 18: NEGATIVE
TRISOMY 21 (T21): NEGATIVE
XXX (TRIPLE X SYNDROME): NOT DETECTED
XXY (KLINEFELTER SYNDROME): NOT DETECTED
XYY (JACOBS SYNDROME): NOT DETECTED

## 2024-11-25 ENCOUNTER — PATIENT MESSAGE (OUTPATIENT)
Dept: OBSTETRICS AND GYNECOLOGY | Facility: CLINIC | Age: 34
End: 2024-11-25
Payer: MEDICAID

## 2024-12-02 ENCOUNTER — TELEPHONE (OUTPATIENT)
Dept: PEDIATRIC CARDIOLOGY | Facility: CLINIC | Age: 34
End: 2024-12-02
Payer: MEDICAID

## 2024-12-02 NOTE — TELEPHONE ENCOUNTER
Called and left voicemail for patient. Informed patient that RN calling to confirm her fetal echo appointment scheduled tomorrow at 9:45am at [facility]. Advised patient call the clinic at [phone] to confirm or confirm on [facility] portal.

## 2024-12-03 ENCOUNTER — CLINICAL SUPPORT (OUTPATIENT)
Dept: PEDIATRIC CARDIOLOGY | Facility: CLINIC | Age: 34
End: 2024-12-03
Payer: MEDICAID

## 2024-12-03 ENCOUNTER — OFFICE VISIT (OUTPATIENT)
Dept: PEDIATRIC CARDIOLOGY | Facility: CLINIC | Age: 34
End: 2024-12-03
Payer: MEDICAID

## 2024-12-03 VITALS
SYSTOLIC BLOOD PRESSURE: 117 MMHG | DIASTOLIC BLOOD PRESSURE: 66 MMHG | WEIGHT: 238.19 LBS | HEIGHT: 62 IN | BODY MASS INDEX: 43.83 KG/M2

## 2024-12-03 DIAGNOSIS — O24.112 PREGNANCY WITH TYPE 2 DIABETES MELLITUS IN SECOND TRIMESTER: ICD-10-CM

## 2024-12-03 DIAGNOSIS — O24.112 PREGNANCY WITH TYPE 2 DIABETES MELLITUS IN SECOND TRIMESTER: Primary | ICD-10-CM

## 2024-12-03 DIAGNOSIS — O36.8390 FETAL ARRHYTHMIA AFFECTING PREGNANCY, ANTEPARTUM: ICD-10-CM

## 2024-12-03 PROCEDURE — 3078F DIAST BP <80 MM HG: CPT | Mod: CPTII,,, | Performed by: STUDENT IN AN ORGANIZED HEALTH CARE EDUCATION/TRAINING PROGRAM

## 2024-12-03 PROCEDURE — 3008F BODY MASS INDEX DOCD: CPT | Mod: CPTII,,, | Performed by: STUDENT IN AN ORGANIZED HEALTH CARE EDUCATION/TRAINING PROGRAM

## 2024-12-03 PROCEDURE — 99203 OFFICE O/P NEW LOW 30 MIN: CPT | Mod: S$PBB,,, | Performed by: STUDENT IN AN ORGANIZED HEALTH CARE EDUCATION/TRAINING PROGRAM

## 2024-12-03 PROCEDURE — 1160F RVW MEDS BY RX/DR IN RCRD: CPT | Mod: CPTII,,, | Performed by: STUDENT IN AN ORGANIZED HEALTH CARE EDUCATION/TRAINING PROGRAM

## 2024-12-03 PROCEDURE — 76827 ECHO EXAM OF FETAL HEART: CPT | Mod: 26,S$PBB,, | Performed by: STUDENT IN AN ORGANIZED HEALTH CARE EDUCATION/TRAINING PROGRAM

## 2024-12-03 PROCEDURE — 76827 ECHO EXAM OF FETAL HEART: CPT | Mod: PBBFAC | Performed by: STUDENT IN AN ORGANIZED HEALTH CARE EDUCATION/TRAINING PROGRAM

## 2024-12-03 PROCEDURE — 76825 ECHO EXAM OF FETAL HEART: CPT | Mod: 26,S$PBB,, | Performed by: STUDENT IN AN ORGANIZED HEALTH CARE EDUCATION/TRAINING PROGRAM

## 2024-12-03 PROCEDURE — 99999 PR PBB SHADOW E&M-EST. PATIENT-LVL III: CPT | Mod: PBBFAC,,, | Performed by: STUDENT IN AN ORGANIZED HEALTH CARE EDUCATION/TRAINING PROGRAM

## 2024-12-03 PROCEDURE — 3074F SYST BP LT 130 MM HG: CPT | Mod: CPTII,,, | Performed by: STUDENT IN AN ORGANIZED HEALTH CARE EDUCATION/TRAINING PROGRAM

## 2024-12-03 PROCEDURE — 93325 DOPPLER ECHO COLOR FLOW MAPG: CPT | Mod: PBBFAC | Performed by: STUDENT IN AN ORGANIZED HEALTH CARE EDUCATION/TRAINING PROGRAM

## 2024-12-03 PROCEDURE — 76825 ECHO EXAM OF FETAL HEART: CPT | Mod: PBBFAC | Performed by: STUDENT IN AN ORGANIZED HEALTH CARE EDUCATION/TRAINING PROGRAM

## 2024-12-03 PROCEDURE — 93325 DOPPLER ECHO COLOR FLOW MAPG: CPT | Mod: 26,S$PBB,, | Performed by: STUDENT IN AN ORGANIZED HEALTH CARE EDUCATION/TRAINING PROGRAM

## 2024-12-03 PROCEDURE — 99213 OFFICE O/P EST LOW 20 MIN: CPT | Mod: PBBFAC,25 | Performed by: STUDENT IN AN ORGANIZED HEALTH CARE EDUCATION/TRAINING PROGRAM

## 2024-12-03 PROCEDURE — 3051F HG A1C>EQUAL 7.0%<8.0%: CPT | Mod: CPTII,,, | Performed by: STUDENT IN AN ORGANIZED HEALTH CARE EDUCATION/TRAINING PROGRAM

## 2024-12-03 PROCEDURE — 1159F MED LIST DOCD IN RCRD: CPT | Mod: CPTII,,, | Performed by: STUDENT IN AN ORGANIZED HEALTH CARE EDUCATION/TRAINING PROGRAM

## 2024-12-03 RX ORDER — CALCIUM CITRATE/VITAMIN D3 200MG-6.25
TABLET ORAL
Qty: 200 STRIP | Refills: 3 | Status: SHIPPED | OUTPATIENT
Start: 2024-12-03

## 2024-12-03 NOTE — TELEPHONE ENCOUNTER
Refill Routing Note   Medication(s) are not appropriate for processing by [facility] for the following reason(s):        Clarification of medication (Rx) details    Pharmacy comment: Alternative Requested:MEDICAID REQUIRES DX CODE ON THIS RX.       [facility] action(s):  Defer               Appointments  past 12m or future 3m with PCP    Date Provider   Last Visit   11/14/2024 Tammy Martinez MD   Next Visit   12/12/2024 Tammy Martinez MD   ED visits in past 90 days: 0        Note composed:3:22 PM 12/03/2024

## 2024-12-03 NOTE — LETTER
December 3, 2024        Angelika Mchugh MD  [address]  [facility] [address]             [facility] - Maternal Fetal Medicine  [address] [place] [address] 4TH FLOOR [facility]  [place] [address]  Phone: [phone]  Fax: [phone]   Patient: Jose Hernandez   MR Number: [id number]   YOB: 1990   Date of Visit: 12/3/2024       Dear Dr. Mchugh:    Thank you for referring Jose eHrnandez to me for evaluation. Below are the relevant portions of my assessment and plan of care.    If you have questions, please do not hesitate to call me. I look forward to following Jose along with you.    Sincerely,      Jhony Edgar MD           CC  No Recipients

## 2024-12-03 NOTE — PROGRESS NOTES
[facility] Maternal Fetal Medicine Fetal Cardiology Clinic    Today, I had the pleasure of evaluating Jose Hernandez who is now 33 y.o. and carrying her third pregnancy at 22 6/7 weeks gestation with an JORGE of 25. She was referred for evaluation of the fetal heart due Type 2 diabetes.      She is carrying a male  fetus, yet unnamed.  Pregnancy thus far has been otherwise uncomplicated. Cell free DNA negative. Type 2 DM on metformin, last HBA1C was 7.3    Obstetric History:    .  Her OB history is otherwise unremarkable.     Past Medical History:   Diagnosis Date    Diabetes mellitus, type 2     Hidradenitis suppurativa        Current Outpatient Medications:     aspirin (ECOTRIN) 81 MG EC tablet, Take 1 tablet (81 mg total) by mouth once daily., Disp: , Rfl:     blood sugar diagnostic (TRUE METRIX GLUCOSE TEST STRIP) Strp, 1 each by Other route 4 (four) times daily., Disp: 200 strip, Rfl: 3    blood sugar diagnostic Strp, 1 strip by Misc.(Non-Drug; Combo Route) route 4 (four) times daily., Disp: 200 each, Rfl: 3    lancets 33 gauge Misc, 1 lancet  by Misc.(Non-Drug; Combo Route) route 4 (four) times daily., Disp: 200 each, Rfl: 3    metFORMIN (GLUCOPHAGE) 1000 MG tablet, Take 1 tablet (1,000 mg total) by mouth 2 (two) times daily with meals., Disp: 180 tablet, Rfl: 3    progesterone (PROMETRIUM) 200 MG capsule, Place 1 capsule (200 mg total) vaginally once daily., Disp: 30 capsule, Rfl: 11    TRUEPLUS LANCETS 33 gauge Misc, USE 4 TIMES A DAY, Disp: 200 each, Rfl: 3    blood-glucose meter Misc, 1 each by Misc.(Non-Drug; Combo Route) route once. for 1 dose, Disp: 1 each, Rfl: 0     Review of patient's allergies indicates:  No Known Allergies    Family History: Negative for congenital heart disease, early coronary artery disease, sudden unexplained death, connective tissues disorders, genetic syndromes, multiple miscarriages or other congenital anomalies.    Social History: Ms. Jose Hernandez is  in a relationship with the father of the baby.  The father of the baby is involved.     FETAL ECHOCARDIOGRAM (summary):  Fetal echocardiogram at 22 6/7 weeks gestation. Indication: Type 2 Diabetes  Normal segmental cardiac anatomy  Normal fetal atrial and PDA shunts  Normal biventricular size and systolic function  There is no pericardial effusion.  (Full report in electronic medical record)    Impression:  Single active male fetus at 22 6/7 wga.  Normal fetal echocardiogram.  No evidence of fetal arrhythmia    Todays fetal echocardiogram is normal, within the limitations of fetal echocardiography.  I discussed with her that fetal echocardiography is insufficiently sensitive to rule out all septal defects, anomalies of pulmonary and systemic veins, arch anomalies, and some valvar abnormalities, nor can it ensure that the ductus arteriosus and foramen ovale will spontaneously close. I discussed the continued importance of tight control of glucose levels throughout the remainder of the pregnancy given the concern of developing ventricular septal hypertrophy with poor glucose control, particularly in the third trimester.     Recommendations:  Location, timing, and mode of delivery will be determined by the obstetrical team.  She does not require further follow-up in the fetal echocardiography clinic, but I would be happy to see her again if additional questions or concerns arise.    Should there be any concerns about the baby's heart after birth, a post- echocardiogram and cardiology consultation are recommended.       The above information was discussed in detail including the use of diagrams, with 30 minutes of total face to face time, with greater than 50% with counseling and coordination of care.  The discussion of the diagnosis and treatment options is as described above.

## 2024-12-11 ENCOUNTER — PATIENT MESSAGE (OUTPATIENT)
Dept: OTHER | Facility: OTHER | Age: 34
End: 2024-12-11
Payer: MEDICAID

## 2024-12-12 ENCOUNTER — OFFICE VISIT (OUTPATIENT)
Dept: MATERNAL FETAL MEDICINE | Facility: CLINIC | Age: 34
End: 2024-12-12
Payer: MEDICAID

## 2024-12-12 ENCOUNTER — TELEPHONE (OUTPATIENT)
Dept: OBSTETRICS AND GYNECOLOGY | Facility: HOSPITAL | Age: 34
End: 2024-12-12
Payer: MEDICAID

## 2024-12-12 ENCOUNTER — PROCEDURE VISIT (OUTPATIENT)
Dept: MATERNAL FETAL MEDICINE | Facility: CLINIC | Age: 34
End: 2024-12-12
Payer: MEDICAID

## 2024-12-12 VITALS
WEIGHT: 236.13 LBS | BODY MASS INDEX: 43.17 KG/M2 | SYSTOLIC BLOOD PRESSURE: 118 MMHG | DIASTOLIC BLOOD PRESSURE: 78 MMHG

## 2024-12-12 DIAGNOSIS — O09.522 MULTIGRAVIDA OF ADVANCED MATERNAL AGE IN SECOND TRIMESTER: ICD-10-CM

## 2024-12-12 DIAGNOSIS — O99.210 OBESITY IN PREGNANCY: ICD-10-CM

## 2024-12-12 DIAGNOSIS — Z36.89 ENCOUNTER FOR ULTRASOUND TO CHECK FETAL GROWTH: ICD-10-CM

## 2024-12-12 DIAGNOSIS — Z36.2 ENCOUNTER FOR FOLLOW-UP ULTRASOUND OF FETAL ANATOMY: ICD-10-CM

## 2024-12-12 DIAGNOSIS — O24.112 PRE-EXISTING TYPE 2 DIABETES MELLITUS DURING PREGNANCY IN SECOND TRIMESTER: ICD-10-CM

## 2024-12-12 DIAGNOSIS — Z36.89 ENCOUNTER FOR ULTRASOUND TO ASSESS FETAL GROWTH: Primary | ICD-10-CM

## 2024-12-12 PROCEDURE — 99999 PR PBB SHADOW E&M-EST. PATIENT-LVL III: CPT | Mod: PBBFAC,,, | Performed by: OBSTETRICS & GYNECOLOGY

## 2024-12-12 PROCEDURE — 99213 OFFICE O/P EST LOW 20 MIN: CPT | Mod: PBBFAC,TH,PO | Performed by: OBSTETRICS & GYNECOLOGY

## 2024-12-12 PROCEDURE — 76816 OB US FOLLOW-UP PER FETUS: CPT | Mod: PBBFAC,PO | Performed by: OBSTETRICS & GYNECOLOGY

## 2024-12-12 NOTE — TELEPHONE ENCOUNTER
Called patient, doing well. Noticed a lump on the left rib, under breast, but not on abdomen. Not painful, no CP or SOB.     Not checking blood sugars, she reports pharmacy didn't fill strips, unsure why, set into pharmacy at [facility]

## 2024-12-12 NOTE — PROGRESS NOTES
MATERNAL-FETAL MEDICINE   FOLLOW UP NOTE      SUBJECTIVE:     Ms. Jose Hernandez is a 33 y.o.  female with IUP at 24w1d who is seen in follow up MFM MD visit and ultrasound:     Problem   Multigravida of Advanced Maternal Age in Second Trimester   Tobacco Use Affecting Pregnancy in Second Trimester, Antepartum   Obesity in Pregnancy   Cervical Shortening Affecting Pregnancy in Second Trimester   Pre-Existing Type 2 Diabetes Mellitus During Pregnancy in Second Trimester   Bipolar 1 Disorder, Mixed   Fetal PAC's earlier in pregnancy: resolved      Epic and Viewpoint notes reviewed     Patient reports she does not have a glucose log today for review and she informed M nurse (Adele) that she was not checking her glucose levels.     Previous notes reviewed in  Epic and viewpoint .   No changes to medical, surgical, family, social, or obstetric history.      Medications:  Current Outpatient Medications   Medication Instructions    aspirin (ECOTRIN) 81 mg, Oral, Daily    blood sugar diagnostic Strp 1 strip, Misc.(Non-Drug; Combo Route), 4 times daily    blood-glucose meter Misc 1 each, Misc.(Non-Drug; Combo Route), Once    lancets 33 gauge Misc 1 lancet , Misc.(Non-Drug; Combo Route), 4 times daily    metFORMIN (GLUCOPHAGE) 1,000 mg, Oral, 2 times daily with meals    progesterone (PROMETRIUM) 200 mg, Vaginal, Daily    TRUE METRIX GLUCOSE TEST STRIP Strp USE 1 STRIP TO TEST BLOOD SUGAR 4 TIMES A DAY    TRUEPLUS LANCETS 33 gauge Misc USE 4 TIMES A DAY         Objective:   /78 (BP Location: Left arm, Patient Position: Sitting)   Wt 107.1 kg (236 lb 1.8 oz)   LMP 2024 (Exact Date)   BMI 43.17 kg/m²     Ultrasound performed. See viewpoint for full ultrasound report.  1. 24w 1d adam intrauterine pregnancy  2. Interval fetal growth wnl (EFW  = 718 gms at 55% and AC 60%)  3. No fetal structural abnormalities appreciated for organs evaluated today but fetal anatomical survey remains incomplete:  see above for details   4. Amniotic fluid volume wnl per MVP 5.8 cm  5. Posterior low lying placenta     Patient counseled on today's ultrasound evaluation including but not limited to above information and recommendations for future ultrasound evaluations. Patient's questions were answered.     Significant labs/imaging:  Cell free DNA Screen negative   Single marker AFP negative 0.82 MOM    ASSESSMENT/PLAN:     33 y.o.  female with IUP at 24w1d     Prior ultrasound with Fetal PAC's:  Today no PAC's visualized - incomplete fetal cardiac evaluation but Peds Cards fetal echocardiogram performed    Peds Cards fetal echocardiogram 24:  Normal segmental cardiac anatomy.  Normal fetal atrial and PDA shunts. Normal biventricular size and systolic function. There is no pericardial effusion.     Recommendations for Primary OB:  -as per previous recommendations     ------------------------------------------------------------------------------------------------------------------------------------------------     Posterior Low Lying placenta     Patient previously was given bleeding precautions and suggest pelvic rest      Recommendations for Primary OB Management:  -32 wk ultrasound  for placental location and to rule out vasa previa as well as RT MFM MD Visit          Earlier as clinically indicated       -M staff will assist patient in scheduling appt      -Bleeding precautions and suggest pelvic rest      ----------------------------------------------------------------------------------------------------------------------------------------------     Pre-existing type 2 diabetes mellitus during pregnancy in second trimester  DM Type II   -Metformin 500 mg bid   -Labs:    24:  Plts 374k   Cr 0.6   AST/ALT 24: hb electrophoresis normal     24:  Plts 378k  Hb A1c 7.1%    24:  HbA1c 7.3%     Patient did not bring a glucose log for review. She reported to the [facility] nurse (Adele)  that she was not checking her glucose levels.         Recommendations for Primary OB Management:  Baseline evaluation (to be ordered by primary OB provider):  24 hour urine protein or urine P/C ratio, CBC, CMP  Maternal EKG; echocardiogram if BMI > 30 or EKG is abnormal  Maternal ophthalmic exam (if hasn't been performed in last year) and podiatry exam  Hemoglobin A1C every trimester  Diabetic education referral and counseling re: goal blood sugars (< 95 mg/dl for fasting, < 120 mg/dl for 2 hour postprandial)  Recommend  check blood sugars 4x daily  Fasting and 2-hour postprandial glucose monitoring.   Goals of fasting levels below 95 mg/dL and postprandial levels below 120 mg/dL.   Weekly assessment of glucose log with Primary OB: as of today, patient reports not checking her glucose levels  MFM available if need assistance   Medications:   Continue  low dose aspirin 81 mg daily for preeclampsia risk reduction  1 mg folic acid daily  Metformin at this time:   Insulin is the gold standard during pregnancy but oral hypoglycemic agents area an alternative medication   Ultrasounds:  Recommend an ultrasound at 28 wks gestation for fetal growth, amniotic fluid volume   Primary OB to order/facilitate   Recommend 32 wk ultrasound and RT MFM MD visit for growth, amniotic fluid volume, BPP and TVU for placental location assessment  -MFM staff will assist patient in scheduling and I recommend Primary OB insure future appt scheduled   Serial growth ultrasounds as noted above     Prenatal testing  Twice weekly BPP/ NST + AFV starting at 32 weeks. (Should be ordered and arranged by the patient's primary OB provider).     Delivery timin 0/7 to 38 and 6/7 weeks if under good control without comorbidities  37 0/7 to 37 and 6/7 weeks if longstanding diabetes or poorly controlled, polyhydramnios, EFW>90th percentile, or BMI >= 40  36 0/7 to 37 and 6/7 weeks if vascular complications, prior stillbirth or other complicating  conditions.  Recommend consideration of earlier delivery if IUGR, HTN, or other complications  Recommend offering  for delivery is EFW is 4500g or more near the time of delivery     Insulin management during labor and delivery  Give usual dose of intermediate acting (NPH) or long-acting insulin at bedtime  Hold morning dose of insulin or reduce to ½ dose   Patients who require insulin should be scheduled as the first available (7 am or 7:30 am)  given the need for NPO status  Check glucose every 2-4 hours in latent labor; hourly in active labor  If blood glucose is less than 70 mg/dl, change IVF to D5 ½ NS at rate of 100-150 cc/hr and monitor blood glucose hourly   IV infusion of regular insulin is recommended if glucose levels exceed 120 mg/dl     Postpartum management  Insulin should be reduced by 1/2 of the pre-delivery dose within the first 6-24 hours following delivery.  Patients who were well-controlled on oral regimens can often return to these following delivery.  Patients who are breastfeeding should be advised to have small snacks before breastfeeding to reduce the risk of hypoglycemia.  Patients should be referred to primary MD or Endocrinology for postpartum management.     The patient to be advised to call for blood sugars less than 70 or greater than 200 prior to her next appointment. She was also advised that if she notes nausea/vomiting, inability to tolerate PO, or concerns about being able to take her insulin that she should contact her provider or present to the OB ED.     ----------------------------------------------------------------------------------------------------------------------     Multigravida of advanced maternal age in second trimester  AMA  Cell free DNA screen negative       Recommendations for Primary OB Management:  Follow-up fetal ultrasound at 32 weeks for interval fetal growth:  To be performed with M   Recommend continued dose aspirin  for preeclampsia  risk reduction     --------------------------------------------------------------------------------------------------------------------------     Tobacco use affecting pregnancy in second trimester, antepartum  Tobacco use in pregnancy   Lives with a smoker      Patient previously counseled    -----------------------------------------------------------------------------------------------------------------------     Obesity in pregnancy  Obesity in pregnancy   BMI 42      Obesity     Recommendations for Primary OB Management:  Recommend TWG goal is 11-20 lbs  Screen for signs/symptoms of obstructive sleep apnea  Continue low dose aspirin 81 mg daily for preeclampsia risk reduction  Fetal growth ultrasound at around 32 weeks if BMI >= 40  To be performed with MFM   Lovenox 40 mg BID for VTE prophylaxis while admitted to the hospital (antepartum or postpartum) if BMI >= 40.   Encouraged breastfeeding  Postpartum lifestyle modifications & weight loss      ------------------------------------------------------------------------------------------------------------------------------------------------     Cervical shortening affecting pregnancy in second trimester  Cervical length shortening   Prometrium 200 mg intravaginal nightly      Ultrasounds:       11-14-24: cervical length was  (25.7 - 28.6) mm       10-23-24: cervical length was 24.7 cm     ------------------------------------------------------------------------------------------------------------------------------------------------     Bipolar 1 disorder, mixed  Bipolar 1 disorder/mixed  Anxiety disorder      Recommendations for Primary OB Management:  -recommend patient be followed by Mental health provider/Psychiatry during pregnancy and postpartum period       FOLLOW UP:   -Recommend an ultrasound at 28 wks gestation for fetal growth, amniotic fluid volume              Primary OB to order/facilitate     -Recommend 32 wk ultrasound and RT MFM MD visit for  growth, amniotic fluid volume, BPP and TVU for placental location assessment             MFM staff will assist patient in scheduling and I recommend Primary OB insure future appt scheduled     -see above conditions for specific recommendations       About 25+ minutes of total time spent on the encounter, which includes face to face time and non-face to face time preparing to see the patient (eg, review of tests), obtaining and/or reviewing separately obtained history, documenting clinical information in the electronic or other health record, independently interpreting results (not separately reported) and communicating results to the patient/family/caregiver, or care coordination (not separately reported).      Angelika Mchugh  Maternal-Fetal Medicine    Electronically Signed by Angelika Mchugh December 12, 2024

## 2024-12-16 ENCOUNTER — PATIENT MESSAGE (OUTPATIENT)
Dept: OBSTETRICS AND GYNECOLOGY | Facility: CLINIC | Age: 34
End: 2024-12-16
Payer: MEDICAID

## 2024-12-18 ENCOUNTER — HOSPITAL ENCOUNTER (OUTPATIENT)
Dept: RADIOLOGY | Facility: HOSPITAL | Age: 34
Discharge: HOME | End: 2024-12-18
Attending: OBSTETRICS & GYNECOLOGY
Payer: MEDICAID

## 2024-12-18 ENCOUNTER — OFFICE VISIT (OUTPATIENT)
Dept: OBSTETRICS AND GYNECOLOGY | Facility: CLINIC | Age: 34
End: 2024-12-18
Payer: MEDICAID

## 2024-12-18 VITALS
WEIGHT: 230.63 LBS | BODY MASS INDEX: 42.17 KG/M2 | SYSTOLIC BLOOD PRESSURE: 118 MMHG | HEART RATE: 128 BPM | DIASTOLIC BLOOD PRESSURE: 78 MMHG

## 2024-12-18 DIAGNOSIS — R16.2 HEPATOSPLENOMEGALY: ICD-10-CM

## 2024-12-18 DIAGNOSIS — Z3A.25 25 WEEKS GESTATION OF PREGNANCY: Primary | ICD-10-CM

## 2024-12-18 LAB
BILIRUB SERPL-MCNC: ABNORMAL MG/DL
BLOOD URINE, POC: ABNORMAL
CLARITY, POC UA: ABNORMAL
COLOR, POC UA: YELLOW
GLUCOSE UR QL STRIP: ABNORMAL
KETONES UR QL STRIP: ABNORMAL
LEUKOCYTE ESTERASE URINE, POC: ABNORMAL
NITRITE, POC UA: ABNORMAL
PH, POC UA: 6.5
PROTEIN, POC: ABNORMAL
SPECIFIC GRAVITY, POC UA: 1.02
UROBILINOGEN, POC UA: 0.2

## 2024-12-18 PROCEDURE — 3078F DIAST BP <80 MM HG: CPT | Mod: CPTII,,, | Performed by: OBSTETRICS & GYNECOLOGY

## 2024-12-18 PROCEDURE — 3074F SYST BP LT 130 MM HG: CPT | Mod: CPTII,,, | Performed by: OBSTETRICS & GYNECOLOGY

## 2024-12-18 PROCEDURE — 99213 OFFICE O/P EST LOW 20 MIN: CPT | Mod: PBBFAC,25,PO | Performed by: OBSTETRICS & GYNECOLOGY

## 2024-12-18 PROCEDURE — 99999 PR PBB SHADOW E&M-EST. PATIENT-LVL III: CPT | Mod: PBBFAC,,, | Performed by: OBSTETRICS & GYNECOLOGY

## 2024-12-18 PROCEDURE — 99214 OFFICE O/P EST MOD 30 MIN: CPT | Mod: S$PBB,TH,, | Performed by: OBSTETRICS & GYNECOLOGY

## 2024-12-18 PROCEDURE — 1159F MED LIST DOCD IN RCRD: CPT | Mod: CPTII,,, | Performed by: OBSTETRICS & GYNECOLOGY

## 2024-12-18 PROCEDURE — 3008F BODY MASS INDEX DOCD: CPT | Mod: CPTII,,, | Performed by: OBSTETRICS & GYNECOLOGY

## 2024-12-18 PROCEDURE — 81002 URINALYSIS NONAUTO W/O SCOPE: CPT | Mod: PBBFAC,PO | Performed by: OBSTETRICS & GYNECOLOGY

## 2024-12-18 PROCEDURE — 99999PBSHW POCT URINE DIPSTICK WITHOUT MICROSCOPE: Mod: PBBFAC,,,

## 2024-12-18 PROCEDURE — 3051F HG A1C>EQUAL 7.0%<8.0%: CPT | Mod: CPTII,,, | Performed by: OBSTETRICS & GYNECOLOGY

## 2024-12-18 PROCEDURE — 76700 US EXAM ABDOM COMPLETE: CPT | Mod: 26,,, | Performed by: RADIOLOGY

## 2024-12-18 PROCEDURE — 76700 US EXAM ABDOM COMPLETE: CPT | Mod: TC

## 2024-12-18 RX ORDER — INSULIN GLARGINE 300 [IU]/ML
30 INJECTION, SOLUTION SUBCUTANEOUS DAILY
Qty: 3 ML | Refills: 11 | Status: SHIPPED | OUTPATIENT
Start: 2024-12-18 | End: 2025-12-18

## 2024-12-18 RX ORDER — PEN NEEDLE, DIABETIC 30 GX3/16"
1 NEEDLE, DISPOSABLE MISCELLANEOUS DAILY
Qty: 90 EACH | Refills: 3 | Status: SHIPPED | OUTPATIENT
Start: 2024-12-18

## 2024-12-18 RX ORDER — BLOOD-GLUCOSE SENSOR
EACH MISCELLANEOUS
Qty: 3 EACH | Refills: 11 | Status: SHIPPED | OUTPATIENT
Start: 2024-12-18

## 2024-12-18 RX ORDER — CLINDAMYCIN HYDROCHLORIDE 300 MG/1
300 CAPSULE ORAL EVERY 8 HOURS
Qty: 42 CAPSULE | Refills: 0 | Status: SHIPPED | OUTPATIENT
Start: 2024-12-18 | End: 2025-01-01

## 2024-12-18 NOTE — PROGRESS NOTES
She has infection that started on , not draining, seems to be getting larger.   Mons: swollen, mild erythema, no fluctuance but firm right above pubic bone     @ 25 0/7 wga  IOB labs done   Consents done   Type 2 DM: Patient reports never formally dx with type 2 dm  1st trimester A1C: 7.1;   2nd trimester: 7.3  Hasn't been checking Bs,. Was able to get test strips, check fastings the past 2 days and were in 130s and another random that was also in 130s  On metformin 1000 BID; but hasn't been taking it because gives her diarrhea. Discussed with her that her blood sugars are not controlled. Her A1C went up in the second trimester and I am really concerned that we will get a complication like a still birth because of her uncontrolled blood sugars. I would recommend starting insulin, since she isn't checking her blood sugars would do a long acting insulin then once checks them can do before meals if needed or adjust more,. Will do 24 hour lantus at 25% of recommended dose   She would be open to a continuous glucose monitor, will refer to endocrinology to see if we can get one approved; referral placed.   Will get peds echo,appt set up 12/3,  echo and EKG for mom, has cardiology appt   Recommend start ASA now   Seeing MFM   AMA:   - did AFP negative    - did MT21  negative     Hidradenitis suppurativa: was on humara,   Appears like flair of HS, declines I&D, will do clindamycin and discussed that may need drainage, will see back in 1 week    Leep with HPV + pap smear   Colpo done in 1st trimester normal    doing CL at 16 weeks    H/o  x2   Consents signed for repeat , she is considering doing TOLAC, discussed risks and benefits, including risk of uterine rupture, she will think about it    Smoking: is cutting down and down form 1 pack to 1/3 pack per day. Encouraged to continue  Short cervix  Doing vaginal progesterone   Understands recommendations to do Q2 weeks CL, she will be out of town when  due for next check so likely will not do any more   Fetal PAC:   Has decreased caffeine, understand recommendations for more frequent fetal monitoring however not able to come more frequently since stays out of town most of the time.    delivery: Previous pregnancy with oligo and delivery at 32 weeks (records from 2017 that can see suggest fetal growth restriction with velamentous cord insertion, needs sign auth to see records, will get next visit)  Undesired fertility: medicaid consent signed   Low lying placenta: repeat US set up      -declines I&D. Will start antibiotics, discussed if worsens will need to go to ER or let us know so we can try to get her back in.     Face to Face time with patient: 40 minutes of total time spent on the encounter, which includes face to face time and non-face to face time preparing to see the patient (eg, review of tests), Obtaining and/or reviewing separately obtained history, Documenting clinical information in the electronic or other health record, Independently interpreting results (not separately reported) and communicating results to the patient/family/caregiver, or Care coordination (not separately reported).

## 2024-12-19 ENCOUNTER — TELEPHONE (OUTPATIENT)
Dept: OBSTETRICS AND GYNECOLOGY | Facility: CLINIC | Age: 34
End: 2024-12-19
Payer: MEDICAID

## 2024-12-19 NOTE — TELEPHONE ENCOUNTER
Patient would like to switch her appointments to Wednesday.       ----- Message from Tresa sent at 12/19/2024 11:10 AM CST -----  Type: Patient Call Back    Who called:pt     What is the request in detail:pt refused to make an appt and refused on call nurse. Call patient Symptom: High Blood Sugar - Caller Reports. No one answered the ma line or [facility] escalations. Call pt  Outcome: Schedule a same-day appointment or talk to a nurse or provider within 1 hour.  Reason: Caller denied all higher acuity questions    The caller accepted this outcome.    Can the clinic reply by [email or web address]?    Would the patient rather a call back or a response via [facility]? call    Best call back number:[phone] (home)       Additional Information:  
negative...

## 2024-12-19 NOTE — TELEPHONE ENCOUNTER
Called patient and informed her that the insulin had been approved and she was able to pick it up as soon as possible.

## 2024-12-24 ENCOUNTER — PATIENT MESSAGE (OUTPATIENT)
Dept: ENDOCRINOLOGY | Facility: CLINIC | Age: 34
End: 2024-12-24

## 2024-12-24 ENCOUNTER — OFFICE VISIT (OUTPATIENT)
Dept: ENDOCRINOLOGY | Facility: CLINIC | Age: 34
End: 2024-12-24
Payer: MEDICAID

## 2024-12-24 ENCOUNTER — HOSPITAL ENCOUNTER (EMERGENCY)
Facility: HOSPITAL | Age: 34
Discharge: HOME | End: 2024-12-24
Attending: EMERGENCY MEDICINE
Payer: MEDICAID

## 2024-12-24 VITALS
HEIGHT: 62 IN | DIASTOLIC BLOOD PRESSURE: 86 MMHG | OXYGEN SATURATION: 98 % | SYSTOLIC BLOOD PRESSURE: 136 MMHG | HEART RATE: 108 BPM | BODY MASS INDEX: 42.33 KG/M2 | TEMPERATURE: 99 F | WEIGHT: 230 LBS | RESPIRATION RATE: 18 BRPM

## 2024-12-24 DIAGNOSIS — E78.2 MIXED HYPERLIPIDEMIA: ICD-10-CM

## 2024-12-24 DIAGNOSIS — L05.91 PILONIDAL CYST: Primary | ICD-10-CM

## 2024-12-24 DIAGNOSIS — O24.112 PRE-EXISTING TYPE 2 DIABETES MELLITUS DURING PREGNANCY IN SECOND TRIMESTER: Primary | ICD-10-CM

## 2024-12-24 PROCEDURE — 99283 EMERGENCY DEPT VISIT LOW MDM: CPT | Mod: 25

## 2024-12-24 PROCEDURE — 25000003 PHARM REV CODE 250: Performed by: EMERGENCY MEDICINE

## 2024-12-24 PROCEDURE — 10080 I&D PILONIDAL CYST SIMPLE: CPT

## 2024-12-24 RX ORDER — CEPHALEXIN 500 MG/1
500 CAPSULE ORAL
Status: COMPLETED | OUTPATIENT
Start: 2024-12-24 | End: 2024-12-24

## 2024-12-24 RX ORDER — PEN NEEDLE, DIABETIC 30 GX3/16"
1 NEEDLE, DISPOSABLE MISCELLANEOUS DAILY
Qty: 100 EACH | Refills: 6 | Status: SHIPPED | OUTPATIENT
Start: 2024-12-24

## 2024-12-24 RX ORDER — CEPHALEXIN 500 MG/1
500 CAPSULE ORAL 4 TIMES DAILY
Qty: 20 CAPSULE | Refills: 0 | Status: SHIPPED | OUTPATIENT
Start: 2024-12-24 | End: 2024-12-31

## 2024-12-24 RX ORDER — INSULIN ASPART 100 [IU]/ML
INJECTION, SOLUTION INTRAVENOUS; SUBCUTANEOUS
Qty: 15 ML | Refills: 6 | Status: SHIPPED | OUTPATIENT
Start: 2024-12-24

## 2024-12-24 RX ORDER — ACETAMINOPHEN 500 MG
1000 TABLET ORAL
Status: COMPLETED | OUTPATIENT
Start: 2024-12-24 | End: 2024-12-24

## 2024-12-24 RX ORDER — INSULIN GLARGINE 300 [IU]/ML
INJECTION, SOLUTION SUBCUTANEOUS
Qty: 15 ML | Refills: 11 | Status: SHIPPED | OUTPATIENT
Start: 2024-12-24

## 2024-12-24 RX ORDER — LIDOCAINE AND PRILOCAINE 25; 25 MG/G; MG/G
CREAM TOPICAL
Status: COMPLETED | OUTPATIENT
Start: 2024-12-24 | End: 2024-12-24

## 2024-12-24 RX ADMIN — LIDOCAINE AND PRILOCAINE: 25; 25 CREAM TOPICAL at 01:12

## 2024-12-24 RX ADMIN — ACETAMINOPHEN 1000 MG: 500 TABLET ORAL at 01:12

## 2024-12-24 RX ADMIN — CEPHALEXIN 500 MG: 500 CAPSULE ORAL at 01:12

## 2024-12-24 NOTE — ASSESSMENT & PLAN NOTE
"Pre-gestational T2DM with BG not at goal for pregnancy on metformin and basal insulin  Continue metformin   Fasting sugars above goal. Some prandial sugars are at goal and some elevated. Will increase basal and add Novolog correction scale ac/hs prn. Will add scheduled prandial doses based on Novolog sliding scale requirement   Also needs dexcom -- she states it was prescribed and is ready for pickup at the pharmacy but she would like an education appt to learn how to use it. Will request medical assistant schedules DM education asap.  We discussed need for eye exam as well as foot exam  We discussed risks of DM in pregnancy - especially if BG uncontrolled     Patient Instructions   Continue Metformin 1000 mg twice daily    Increase Lantus to 35 units at bedtime    Start Novolog sliding scale below before each meal and at bedtime. Try to give Novolog 10-15 min before eating. We will add a set dose with each meal plus the sliding scale when we see how much Novolog you are needing.    Novolog correction based on before meal glucose and bedtime glucose:  130-180: add 2 unit  181-230: add 4 units  231-280: add 6 units  281-330: add 8 units  >330: add 10 units    Monitor glucose fasting, before meals and either 1 hr or 2 hrs after each meal. Keep a log and send me a photo of the log in 1 week.     Pick u[p Dexcom G7 from the pharmacy. We will get you scheduled with a diabetes educator asap to teach you how to use it.    Pregnancy goals  Blood sugar goals during pregnancy for women with preexisting diabetes:    Fasting 70-90   One-hour postprandial (1 hr after meals) </=140 mg/dL   Two-hour postprandial (2 hrs after meals) </=120 mg/dL   A1c goals during pregnancy: 6-6.5%, but ideally <6% as pregnancy progresses if possible without severe hypoglycemia     "15-15 Rule" for hypoglycemia treatment  - Many people tend to want to eat as much as they can for low blood glucose until they feel better. This can cause blood glucose " "levels to go very high, which is bad. Using the step-wise approach of the "15-15 Rule" can help you correct a low blood glucose while also preventing subsequent high blood glucose levels  - If you experience an episode of hypoglycemia (glucose less than 70), please follow the "15-15 rule": Take 15 grams of carbohydrate (see examples below) to raise your blood sugar and recheck it after 15 minutes. If still below 70 mg/dL, take another 15 gram serving of carbohydrate  -Repeat these steps until your blood sugar is at least 70 mg/dL. Once your blood sugar is back to normal, eat a small serving of protein to make sure it doesnt lower again    -Examples of 15g of carbohydrate:  4 ounces (1/2 cup) of juice or regular soda (not diet)  1 tablespoon of sugar, honey, or corn syrup  Hard candies, jellybeans, or gumdrops--see food label for how many to consume  Make a note about any episodes of low blood sugar and talk with your health care team if they are recurrent     - Note: When treating a low blood glucose, the choice of carbohydrate source is important. Complex carbohydrates, or foods that contain fats along with carbs (like chocolate) can slow the absorption of glucose and should not be used to treat an emergency low      - For more information, please visit:  [email or web address]      Diet/Meal carbohydrate breakdown:  Breakfast: 30g  Lunch: 45-60g  Dinner: 45-60g   Snacks:  15-30g snacks; incorporate protein at meals/snacks.     Aim for ~175 grams of carbohydrate daily, divided into 3 regularly-spaced small-to-moderate sized meals and 2-4 snacks.        "

## 2024-12-24 NOTE — PROGRESS NOTES
Endocrinology New Visit   12/24/2024  The patient location is: in her car  Visit type: audiovisual    Face to Face time with patient: 50  60 minutes of total time spent on the encounter, which includes face to face time and non-face to face time preparing to see the patient (eg, review of tests), Obtaining and/or reviewing separately obtained history, Documenting clinical information in the electronic or other health record, Independently interpreting results (not separately reported) and communicating results to the patient/family/caregiver, or Care coordination (not separately reported).     Each patient to whom he or she provides medical services by telemedicine is:  (1) informed of the relationship between the physician and patient and the respective role of any other health care provider with respect to management of the patient; and (2) notified that he or she may decline to receive medical services by telemedicine and may withdraw from such care at any time.    Subjective:      Chief Complaint:  Diabetes      History of Present Illness  Jose Hernandez is a 34 y.o. female with tobacco use d/o, T2DM, obesity, hidradenitis suppurativa (was on Humira before pregnancy) referred by No ref. provider found for evaluation of T2DM.    She is currently pregnant, gestational age 25w6d.  Pregnancy c/b low lying placenta, hx C/S x2, tobacco use, short cervix, and obesity   Ob started metformin and basal insulin but pt was not taking when she was last seen by the OB in mid-December.       T2DM  Diagnosed early in current pregnancy with 1st trimester A1c 7.1%; pt reportedly unaware of dx prior to this. However A1c was also 7.1% in 9/2023.  preDM before, she denies DM or GDM in prior pregnancies   Known complications: gastroparesis    Current Diabetes Regimen:  Metformin 1000 mg BID  Lantus 30u qhs    Taking insulin consistently - occ misses one of the metformin doses due to SE - vomiting diarrhea      Prior  "mediations tried:  none    Diet/Exercise:  Working on diet  Biggest meal in the evening - prev was working overnight but been on day schedule    Recent Hgb A1C:  Lab Results   Component Value Date    HGBA1C 7.3 (H) 11/14/2024       Glucose Monitoring:  Checks fingersticks 6x/day  Fasting 120, 105, 108, 98  2hr postprandial - 189, 130, 145, 171, 120, 116, 116, 106, 130, 200s x1 yesterday  CGM was prescribed by her OB and is ready for pickup at the pharmacy - she plans to pick It up soon and would like a DM ed visit for teaching.     Hypoglycemic Episodes: no lows, we discussed how to correct lows     Screening / DM Complications:    Nephropathy:  ACEi/ARB: Not taking  No results found for: "MICALBCREAT"    Last Lipid Panel:  Statin: Not taking  No results found for: "LDLCALC"    Last foot exam Most Recent Foot Exam Date: Not Found  Last eye exam Most Recent Eye Exam Date: Not Found;  annually for contacts and glasses, thinks she has an appt coming up; no laser surgery or DR    B12:  No results found for: "NIRPGJPT69"      ROS:   As above    Objective:     LMP 06/26/2024 (Exact Date)   BP Readings from Last 3 Encounters:   12/24/24 136/86   12/18/24 118/78   12/12/24 118/78     Wt Readings from Last 1 Encounters:   12/24/24 0009 104.3 kg (230 lb)     There is no height or weight on file to calculate BMI.      Physical Exam  Constitutional:       Appearance: She is obese.   HENT:      Head: Normocephalic.      Mouth/Throat:      Mouth: Mucous membranes are moist.   Pulmonary:      Effort: Pulmonary effort is normal.   Neurological:      Mental Status: She is alert and oriented to person, place, and time.   Psychiatric:         Mood and Affect: Mood normal.         Behavior: Behavior normal.       Lab Review:   Lab Results   Component Value Date    HGBA1C 7.3 (H) 11/14/2024     No results found for: "CHOL", "HDL", "LDLCALC", "TRIG", "CHOLHDL"  Lab Results   Component Value Date     09/06/2023    K 3.9 09/06/2023 " "    09/06/2023    CO2 24 09/06/2023     09/06/2023    BUN 7 09/06/2023    CREATININE 0.8 09/06/2023    CALCIUM 9.4 09/06/2023    PROT 7.3 01/06/2020    ALBUMIN 3.7 01/06/2020    BILITOT 0.3 01/06/2020    ALKPHOS 70 01/06/2020    AST 39 01/06/2020    ALT 51 (H) 01/06/2020    ANIONGAP 11 09/06/2023    ESTGFRAFRICA >60 01/06/2020    EGFRNONAA >60 01/06/2020     No results found for: "[id number]"      All relevant labs and imaging reviewed.    Assessment and Plan     Pre-existing type 2 diabetes mellitus during pregnancy in second trimester  Pre-gestational T2DM with BG not at goal for pregnancy on metformin and basal insulin  Continue metformin   Fasting sugars above goal. Some prandial sugars are at goal and some elevated. Will increase basal and add Novolog correction scale ac/hs prn. Will add scheduled prandial doses based on Novolog sliding scale requirement   Also needs dexcom -- she states it was prescribed and is ready for pickup at the pharmacy but she would like an education appt to learn how to use it. Will request medical assistant schedules DM education asap.  We discussed need for eye exam as well as foot exam  We discussed risks of DM in pregnancy - especially if BG uncontrolled     Patient Instructions   Continue Metformin 1000 mg twice daily    Increase Lantus to 35 units at bedtime    Start Novolog sliding scale below before each meal and at bedtime. Try to give Novolog 10-15 min before eating. We will add a set dose with each meal plus the sliding scale when we see how much Novolog you are needing.    Novolog correction based on before meal glucose and bedtime glucose:  130-180: add 2 unit  181-230: add 4 units  231-280: add 6 units  281-330: add 8 units  >330: add 10 units    Monitor glucose fasting, before meals and either 1 hr or 2 hrs after each meal. Keep a log and send me a photo of the log in 1 week.     Pick u[p Dexcom G7 from the pharmacy. We will get you scheduled with a " "diabetes educator asap to teach you how to use it.    Pregnancy goals  Blood sugar goals during pregnancy for women with preexisting diabetes:    Fasting 70-90   One-hour postprandial (1 hr after meals) </=140 mg/dL   Two-hour postprandial (2 hrs after meals) </=120 mg/dL   A1c goals during pregnancy: 6-6.5%, but ideally <6% as pregnancy progresses if possible without severe hypoglycemia     "15-15 Rule" for hypoglycemia treatment  - Many people tend to want to eat as much as they can for low blood glucose until they feel better. This can cause blood glucose levels to go very high, which is bad. Using the step-wise approach of the "15-15 Rule" can help you correct a low blood glucose while also preventing subsequent high blood glucose levels  - If you experience an episode of hypoglycemia (glucose less than 70), please follow the "15-15 rule": Take 15 grams of carbohydrate (see examples below) to raise your blood sugar and recheck it after 15 minutes. If still below 70 mg/dL, take another 15 gram serving of carbohydrate  -Repeat these steps until your blood sugar is at least 70 mg/dL. Once your blood sugar is back to normal, eat a small serving of protein to make sure it doesnt lower again    -Examples of 15g of carbohydrate:  4 ounces (1/2 cup) of juice or regular soda (not diet)  1 tablespoon of sugar, honey, or corn syrup  Hard candies, jellybeans, or gumdrops--see food label for how many to consume  Make a note about any episodes of low blood sugar and talk with your health care team if they are recurrent     - Note: When treating a low blood glucose, the choice of carbohydrate source is important. Complex carbohydrates, or foods that contain fats along with carbs (like chocolate) can slow the absorption of glucose and should not be used to treat an emergency low      - For more information, please " visit:  [email or web address]      Diet/Meal carbohydrate breakdown:  Breakfast: 30g  Lunch: 45-60g  Dinner: 45-60g   Snacks:  15-30g snacks; incorporate protein at meals/snacks.     Aim for ~175 grams of carbohydrate daily, divided into 3 regularly-spaced small-to-moderate sized meals and 2-4 snacks.          Mixed hyperlipidemia  Will need to address postpartum       DM education for cgm teaching asap (William is easiest for her)  Jan 8th at 2:30p virtual visit with me please!      Tiki Kwon MD  [facility]    Visit today included increased complexity associated with the care of the problems addressed and managing the longitudinal care of the patient due to the serious and/or complex managed problems

## 2024-12-24 NOTE — PATIENT INSTRUCTIONS
"Continue Metformin 1000 mg twice daily    Increase Lantus to 35 units at bedtime    Start Novolog sliding scale below before each meal and at bedtime. Try to give Novolog 10-15 min before eating. We will add a set dose with each meal plus the sliding scale when we see how much Novolog you are needing.    Novolog correction based on before meal glucose and bedtime glucose:  130-180: add 2 unit  181-230: add 4 units  231-280: add 6 units  281-330: add 8 units  >330: add 10 units    Monitor glucose fasting, before meals and either 1 hr or 2 hrs after each meal. Keep a log and send me a photo of the log in 1 week.     Pick u[p Dexcom G7 from the pharmacy. We will get you scheduled with a diabetes educator asap to teach you how to use it.    Pregnancy goals  Blood sugar goals during pregnancy for women with preexisting diabetes:    Fasting 70-90   One-hour postprandial (1 hr after meals) </=140 mg/dL   Two-hour postprandial (2 hrs after meals) </=120 mg/dL   A1c goals during pregnancy: 6-6.5%, but ideally <6% as pregnancy progresses if possible without severe hypoglycemia     "15-15 Rule" for hypoglycemia treatment  - Many people tend to want to eat as much as they can for low blood glucose until they feel better. This can cause blood glucose levels to go very high, which is bad. Using the step-wise approach of the "15-15 Rule" can help you correct a low blood glucose while also preventing subsequent high blood glucose levels  - If you experience an episode of hypoglycemia (glucose less than 70), please follow the "15-15 rule": Take 15 grams of carbohydrate (see examples below) to raise your blood sugar and recheck it after 15 minutes. If still below 70 mg/dL, take another 15 gram serving of carbohydrate  -Repeat these steps until your blood sugar is at least 70 mg/dL. Once your blood sugar is back to normal, eat a small serving of protein to make sure it doesnt lower again    -Examples of 15g of carbohydrate:  4 ounces " (1/2 cup) of juice or regular soda (not diet)  1 tablespoon of sugar, honey, or corn syrup  Hard candies, jellybeans, or gumdrops--see food label for how many to consume  Make a note about any episodes of low blood sugar and talk with your health care team if they are recurrent     - Note: When treating a low blood glucose, the choice of carbohydrate source is important. Complex carbohydrates, or foods that contain fats along with carbs (like chocolate) can slow the absorption of glucose and should not be used to treat an emergency low      - For more information, please visit:  [email or web address]      Diet/Meal carbohydrate breakdown:  Breakfast: 30g  Lunch: 45-60g  Dinner: 45-60g   Snacks:  15-30g snacks; incorporate protein at meals/snacks.     Aim for ~175 grams of carbohydrate daily, divided into 3 regularly-spaced small-to-moderate sized meals and 2-4 snacks.

## 2024-12-24 NOTE — Clinical Note
DM education for cgm teaching asap (William is easiest for her) Jan 8th at 2:30p virtual visit with me please!

## 2024-12-24 NOTE — ED PROVIDER NOTES
Encounter Date: 12/24/2024       History     Chief Complaint   Patient presents with    Abscess     Patient reports an abscess to her sacrum that was previously drained but returned. She is currently on antibiotics to treat a different abscess. Denies fever.      Chief complaint: Pilonidal cyst    HPI:  84-year-old female with a history of diabetes and pilonidal cyst presents with a 2 day history of sacral pain.  She has had no fever, night sweats or chills.  She is 26 weeks pregnant.  She also has a history of hidradenitis suppurativa.      Review of patient's allergies indicates:  No Known Allergies  Past Medical History:   Diagnosis Date    Diabetes mellitus, type 2     Hidradenitis suppurativa      Past Surgical History:   Procedure Laterality Date    COLD KNIFE CONIZATION OF CERVIX N/A 9/6/2023    Procedure: CONE BIOPSY, CERVIX, USING COLD KNIFE;  Surgeon: Tammy Martinez MD;  Location: [facility] OR;  Service: OB/GYN;  Laterality: N/A;    INCISION AND DRAINAGE OF GENITAL LABIA Left 9/6/2023    Procedure: INCISION AND DRAINAGE, VULVAR ABSCESS;  Surgeon: Tammy Martinez MD;  Location: [facility] OR;  Service: OB/GYN;  Laterality: Left;     Family History   Problem Relation Name Age of Onset    Breast cancer Paternal Aunt      Colon cancer Neg Hx      Ovarian cancer Neg Hx       Social History     Tobacco Use    Smoking status: Every Day     Current packs/day: 1.00     Average packs/day: 1 pack/day for 18.0 years (18.0 ttl pk-yrs)     Types: Cigarettes    Smokeless tobacco: Never    Tobacco comments:     12/21/21 in smoking program    Substance Use Topics    Alcohol use: Not Currently     Comment: occasionally    Drug use: Never     Review of Systems   Constitutional:  Negative for fever.   Respiratory:  Negative for shortness of breath.    Genitourinary:  Negative for flank pain.   Musculoskeletal:  Negative for gait problem.   Skin:  Negative for color change.   Neurological:  Negative for weakness.   Psychiatric/Behavioral:   Negative for confusion.        Physical Exam     Initial Vitals [12/24/24 0009]   BP Pulse Resp Temp SpO2   136/86 108 18 98.6 °F (37 °C) 98 %      MAP       --         Physical Exam    Nursing note and vitals reviewed.  Constitutional: Vital signs are normal. She appears well-developed and well-nourished. She is active.   HENT:   Head: Normocephalic and atraumatic.   Eyes: Lids are normal.   Neck: Phonation normal.   Cardiovascular:            Tachycardic rate   Pulmonary/Chest: No respiratory distress.   Abdominal: She exhibits no distension.     Neurological: She is alert.   Skin: Skin is warm and intact.   Fluctuant 2 cm nodule over the sacrum   Psychiatric: Her speech is normal.         ED Course   I & D - Incision and Drainage    Date/Time: 12/24/2024 1:37 AM  Location procedure was performed: [facility] EMERGENCY DEPARTMENT    Performed by: Rui Beauchamp III, MD  Authorized by: Rui Beauchamp III, MD  Consent Done: Not Needed  Type: pilonidal cyst  Body area: anogenital  Location details: gluteal cleft  Anesthesia: local infiltration    Anesthesia:  Local Anesthetic: lidocaine 2% with epinephrine  Anesthetic total: 2 mL  Scalpel size: 11  Incision type: single straight  Incision depth: subcutaneous  Complexity: simple  Drainage: pus  Drainage amount: copious  Wound treatment: incision and wound left open    Incision depth: subcutaneous        Labs Reviewed - No data to display       Imaging Results    None          Medications   cephALEXin capsule 500 mg (has no administration in time range)   acetaminophen tablet 1,000 mg (has no administration in time range)   LIDOcaine-prilocaine cream ( Topical (Top) Given 12/24/24 0116)     Medical Decision Making  34-year-old female with a history of pilonidal cyst returns with another pilonidal cyst.  The cyst is incised and drained with copious exudate expressed.  She is placed on Keflex and referred to General surgery for consideration of definitive management of  same.  She is encouraged to return for worsening swelling or pain.    Risk  OTC drugs.  Prescription drug management.                                      Clinical Impression:  Final diagnoses:  [L05.91] Pilonidal cyst (Primary)          ED Disposition Condition    Discharge Stable          ED Prescriptions       Medication Sig Dispense Start Date End Date Auth. Provider    cephALEXin (KEFLEX) 500 MG capsule Take 1 capsule (500 mg total) by mouth 4 (four) times daily. for 7 days 20 capsule 12/24/2024 12/31/2024 Rui Beauchamp III, MD          Follow-up Information       Follow up With Specialties Details Why Contact Info    Francisco Ramsey MD Surgery In 3 days  [address] [facility] [address]  [phone]               Rui Beauchamp III, MD  12/24/24 0775

## 2024-12-25 ENCOUNTER — PATIENT MESSAGE (OUTPATIENT)
Dept: OTHER | Facility: OTHER | Age: 34
End: 2024-12-25
Payer: MEDICAID

## 2025-01-02 ENCOUNTER — E-VISIT (OUTPATIENT)
Dept: OBSTETRICS AND GYNECOLOGY | Facility: CLINIC | Age: 35
End: 2025-01-02
Payer: MEDICAID

## 2025-01-02 DIAGNOSIS — L05.91 PILONIDAL CYST: Primary | ICD-10-CM

## 2025-01-02 NOTE — PROGRESS NOTES
Patient ID: Jose Hernandez is a 34 y.o. female.    Chief Complaint: General Illness (Entered automatically based on patient selection in [employer].)    The patient initiated a request through [employer] on 1/2/2025 for evaluation and management with a chief complaint of General Illness (Entered automatically based on patient selection in [employer].)     I evaluated the questionnaire submission on 1/2/25.    [facility]    1/2/2025 10:34 AM CST - Filed by Patient   What do you need help with? Other Concern   Do you agree to participate in an E-Visit? Yes   If you have any of the following symptoms, please present to your local emergency room or call 911:  I acknowledge   Do you have any of the following pregnancy-related conditions? None   What is the main issue you would like addressed today? Pilonidal cyst   Please describe your symptoms I went to the er on 12/24 for this cyst, they cut it some drained and the put me on cephaxlin and i felt better for a couple of days. Now its back and painful and i dont know what to do, i dont want to keep getting slices in my ass crack.   Where is your problem located? Tail bone   How severe are your symptoms? Severe   Have you had these symptoms before? Yes   How long have you been having these symptoms? For a few days   Please list any medications or treatments you have used for your condition and indicate if it was effective or not. Antibiotics, drainage   What makes this feel better? It not being there   What makes this feel worse? Its very presence   Are these symptoms related to a condition that you currently have? I am not sure   What is the condition?    When were you last seen for this condition? 12/24/2024   Please describe any probable cause for these symptoms Unsure, probably because life is torturing   Provide any additional information you feel is important.    Please attach any relevant images or files    Are you able to take your vital signs?  No         Encounter Diagnosis   Name Primary?    Pilonidal cyst Yes        Orders Placed This Encounter   Procedures    Ambulatory referral/consult to General Surgery     Standing Status:   Future     Standing Expiration Date:   2/2/2026     Referral Priority:   Routine     Referral Type:   Consultation     Referral Reason:   Specialty Services Required     Requested Specialty:   General Surgery     Number of Visits Requested:   1            No follow-ups on file.      E-Visit Time Tracking:

## 2025-01-07 ENCOUNTER — CLINICAL SUPPORT (OUTPATIENT)
Dept: DIABETES | Facility: CLINIC | Age: 35
End: 2025-01-07
Payer: MEDICAID

## 2025-01-07 VITALS — BODY MASS INDEX: 42.31 KG/M2 | WEIGHT: 229.94 LBS | HEIGHT: 62 IN

## 2025-01-07 DIAGNOSIS — O24.112 PRE-EXISTING TYPE 2 DIABETES MELLITUS DURING PREGNANCY IN SECOND TRIMESTER: ICD-10-CM

## 2025-01-07 PROCEDURE — G0108 DIAB MANAGE TRN  PER INDIV: HCPCS | Mod: PBBFAC | Performed by: REGISTERED NURSE

## 2025-01-07 PROCEDURE — 99999PBSHW PR PBB SHADOW TECHNICAL ONLY FILED TO HB: Mod: PBBFAC,,,

## 2025-01-07 PROCEDURE — 99999 PR PBB SHADOW E&M-EST. PATIENT-LVL II: CPT | Mod: PBBFAC,,,

## 2025-01-07 PROCEDURE — 99212 OFFICE O/P EST SF 10 MIN: CPT | Mod: PBBFAC | Performed by: REGISTERED NURSE

## 2025-01-07 NOTE — PROGRESS NOTES
"Diabetes Care Specialist Follow-up Note  Author: Ish Herrera RN  Date: 1/7/2025    Intake    Program Intake  Reason for Diabetes Program Visit:: Intervention  Type of Intervention:: Individual  Individual: Education, Device Training  Education: Self-Management Skill Review, Nutrition and Meal Planning  Device Training: Personal CGM    Current Diabetes Treatment: Oral Medications, DM Injectables  Oral Medication Type/Dose: metformin  DM Injectables Type/Dose: novolog toujeo    Continuous Glucose Monitoring  Patient has CGM: Yes  Personal CGM type:: g7 started today    Lab Results   Component Value Date    HGBA1C 7.3 (H) 11/14/2024     A1c Pre Diabetes Care Specialist Intervention:  7.3%      Weight: 104.3 kg (229 lb 15 oz)   Height: 5' 2" (157.5 cm)   Body mass index is 42.06 kg/m².    Physical activity/Exercise:   none    SMBG: g7 started  DEXCOM G6 CGM TRAINING     Patient referred to clinic today for Dexcom G7 continuous glucose sensor system training.  Patient arrived with  fully charged and Dexcom G7 mobile erica downloaded to phone. Education was provided using "Quick Start Guide" per Dexcom protocol.     Pt will be using his/her phone as the primary .  Overview:  5min glucose reading updates, trending arrows, BG graph screens, battery life indicator, Blue Tooth Symbol.  Menus: trend Graph, start sensor, enter BG, events, Alerts, Settings, Shutdown, Stop Sensor   Settings:                          * Urgent Low: 55 mg/dL                          * Urgent Low Soon: on                          * Low alert: 80 mg/dl repeat 15 min                          * High alert: 250 mg/dl repeat 30 min                          * Rise rate: off                          * Fall Rate: off                          * Signal Loss: on repeat 20 min                          * No Reading: on repeat 20 min                          * Always sound: on                          * Alert Schedule:  (instructed on " how to set up alert schedule if desired)     Reviewed additional setting options with patient, including Graph Height and Transmitter ID. Transmitter was paired with .    Reviewed where to find sensor insertion time and sensor expiration date.   Discussed no need to calibrate sensor during the entirety of the 10 day wear. Discussed that pt can calibrate sensor if desired, but at that time she will need to continue calibrating every 12 hours for sensor to remain accurate. Reviewed appropriate calibration techniques.  Reviewed sensor site selection. Site selected and prepped using aseptic technique Inserted to left forearm. Transmitter placed in pod and secured.  Practiced sensor pod/transmitter removal from site, and removal of transmitter from sensor pod.  Patient able to demonstrate without difficulty.  Encouraged to review manual prior to starting another sensor.   Reviewed problem solving aspects of sensor transmission/ variables that can disrupt RF transmission.  range 20 feet, but the first 3 hrs keep within 3 feet of transmitter.  Pt instructed on lag time of interstitial fluid from CBG and was advised to tx hypoglycemia and dose insulin based on SMBG values.  Dexcom technical support contact number given and examples of when to contact them discussed. Pt will download software at home for Dexcom download.   Patient advised to always check glucose with glucometer should readings do not match symptoms felt (ex. Hypo or hyperglycemia).     Nutrition/Healthy Eating  Meal Plan 24 Hour Recall - Breakfast: coffee, sausage biscuit  Meal Plan 24 Hour Recall - Lunch: grilled chickes sandwich  Meal Plan 24 Hour Recall - Dinner: 1 samll pizza apple juice  Meal Plan 24 Hour Recall - Snack: 2 bananas juice  Meal Plan 24 Hour Recall - Beverage: juice water         Home Blood Glucose Monitoring  Personal CGM type:: g7 started today                During today's follow-up visit,  the following areas required  further assessment and content was provided/reviewed.    Based on today's diabetes care assessment, the following areas of need were identified:      Identified Areas of Need      Medication/Current Diabetes Treatment:     Lifestyle Coping/Support:     Diabetes Disease Process/Treatment Options:     Nutrition/Healthy Eating:      Physical Activity/Exercise:      Home Blood Glucose Monitoring:      Acute Complications:      Chronic Complications:         Today's interventions were provided through individual discussion, instruction, and written materials were provided.    Patient verbalized understanding of instruction and written materials.  Pt was able to return back demonstration of instructions today. Patient understood key points, needs reinforcement and further instruction.     Diabetes Self-Management Care Plan Review and Evaluation of Progress:    During today's follow-up Anabella Diabetes Self-Management Care Plan progress was reviewed and progress was evaluated including his/her input. Jose has agreed to continue his/her journey to improve/maintain overall diabetes control by continuing to set health goals. See care plan progress below.      Care Plan: Diabetes Management   Updates made since 1/8/2024 12:00 AM        Problem: Healthy Eating         Goal: Eat 3 meals  and 3 snacks daily    Start Date: 1/7/2025   Expected End Date: 6/6/2025   Priority: Medium   Barriers: No Barriers Identified   Note:    1/7/25  Pt came to clinic for instruction on starting g7. Pt is pregnant and is testing her blood sugars a couple of times a day but at random times. She does not eat consistently due to gastroparesis. She is not taking all of her Novolog ordered due to skipping meals Blood sugars range from 119-182. Pt was instructed to test 4 times a day fasting in am and 2 hours after breakfast, lunch and dinner and before each meal to see how much Novolog she should take. Instructed pt on the food groups, how to read  labels and count carbs. Pt was given sample menus and meal plans as examples. Discussed with pt the importance of eating 3 balanced meals and 3 snacks per day. Discussed with pt that juice and bananas are carbs, must be portioned and counted as part of her snack or meal. Pt instructed on starting G7- see notes.       Task: Reviewed the sources and role of Carbohydrate, Protein, and Fat and how each nutrient impacts blood sugar.         Task: Provided visual examples using dry measuring cups, food models, and other familiar objects such as computer mouse, deck or cards, tennis ball etc. to help with visualization of portions.         Task: Explained how to count carbohydrates using the food label and the use of dry measuring cups for accurate carb counting.         Task: Discussed strategies for choosing healthier menu options when dining out.         Task: Recommended replacing beverages containing high sugar content with noncaloric/sugar free options and/or water.         Task: Review the importance of balancing carbohydrates with each meal using portion control techniques to count servings of carbohydrate and label reading to identify serving size and amount of total carbs per serving.         Task: Provided Sample plate method and reviewed the use of the plate to estimate amounts of carbohydrate per meal.           Follow Up Plan     Follow up if symptoms worsen or fail to improve.    Today's care plan and follow up schedule was discussed with patient.  Jose verbalized understanding of the care plan, goals, and agrees to follow up plan.        The patient was encouraged to communicate with his/her health care provider/physician and care team regarding his/her condition(s) and treatment.  I provided the patient with my contact information today and encouraged to contact me via phone or [facility] Portal as needed.     Length of Visit   Total Time: 60 Minutes

## 2025-01-08 ENCOUNTER — PATIENT MESSAGE (OUTPATIENT)
Dept: ENDOCRINOLOGY | Facility: CLINIC | Age: 35
End: 2025-01-08

## 2025-01-08 ENCOUNTER — PROCEDURE VISIT (OUTPATIENT)
Dept: MATERNAL FETAL MEDICINE | Facility: CLINIC | Age: 35
End: 2025-01-08
Payer: MEDICAID

## 2025-01-08 ENCOUNTER — PATIENT MESSAGE (OUTPATIENT)
Dept: OTHER | Facility: OTHER | Age: 35
End: 2025-01-08
Payer: MEDICAID

## 2025-01-08 ENCOUNTER — ROUTINE PRENATAL (OUTPATIENT)
Dept: OBSTETRICS AND GYNECOLOGY | Facility: CLINIC | Age: 35
End: 2025-01-08
Payer: MEDICAID

## 2025-01-08 ENCOUNTER — OFFICE VISIT (OUTPATIENT)
Dept: ENDOCRINOLOGY | Facility: CLINIC | Age: 35
End: 2025-01-08
Payer: MEDICAID

## 2025-01-08 VITALS — WEIGHT: 234 LBS | BODY MASS INDEX: 42.8 KG/M2

## 2025-01-08 DIAGNOSIS — O24.112 PRE-EXISTING TYPE 2 DIABETES MELLITUS DURING PREGNANCY IN SECOND TRIMESTER: Primary | ICD-10-CM

## 2025-01-08 DIAGNOSIS — N88.3 SHORT CERVIX: ICD-10-CM

## 2025-01-08 DIAGNOSIS — Z3A.28 28 WEEKS GESTATION OF PREGNANCY: Primary | ICD-10-CM

## 2025-01-08 DIAGNOSIS — E78.2 MIXED HYPERLIPIDEMIA: ICD-10-CM

## 2025-01-08 PROCEDURE — 99213 OFFICE O/P EST LOW 20 MIN: CPT | Mod: TH,S$PBB,, | Performed by: OBSTETRICS & GYNECOLOGY

## 2025-01-08 PROCEDURE — 99213 OFFICE O/P EST LOW 20 MIN: CPT | Mod: PBBFAC,PO | Performed by: OBSTETRICS & GYNECOLOGY

## 2025-01-08 PROCEDURE — 76816 OB US FOLLOW-UP PER FETUS: CPT | Mod: PBBFAC,PO | Performed by: OBSTETRICS & GYNECOLOGY

## 2025-01-08 PROCEDURE — 99999 PR PBB SHADOW E&M-EST. PATIENT-LVL III: CPT | Mod: PBBFAC,,, | Performed by: OBSTETRICS & GYNECOLOGY

## 2025-01-08 RX ORDER — ACETAMINOPHEN 500 MG
1000 TABLET ORAL EVERY 6 HOURS PRN
COMMUNITY
Start: 2025-01-02 | End: 2025-01-12

## 2025-01-08 RX ORDER — CEPHALEXIN 500 MG/1
500 CAPSULE ORAL
COMMUNITY
Start: 2025-01-02 | End: 2025-01-12

## 2025-01-08 NOTE — PROGRESS NOTES
Endocrinology Return Visit   01/08/2025  The patient location is: in a restaurant   Visit type: audiovisual    Face to Face time with patient: 30  40 minutes of total time spent on the encounter, which includes face to face time and non-face to face time preparing to see the patient (eg, review of tests), Obtaining and/or reviewing separately obtained history, Documenting clinical information in the electronic or other health record, Independently interpreting results (not separately reported) and communicating results to the patient/family/caregiver, or Care coordination (not separately reported).     Each patient to whom he or she provides medical services by telemedicine is:  (1) informed of the relationship between the physician and patient and the respective role of any other health care provider with respect to management of the patient; and (2) notified that he or she may decline to receive medical services by telemedicine and may withdraw from such care at any time.    Subjective:      Chief Complaint:  Diabetes      History of Present Illness  Jose Hernandez is a 34 y.o. female with tobacco use d/o, T2DM, obesity, hidradenitis suppurativa (was on Humira before pregnancy) referred by No ref. provider found for evaluation of T2DM.    She is currently pregnant, gestational age 28w2d.  Pregnancy c/b low lying placenta, hx C/S x2, tobacco use, short cervix, and obesity     Today she reports:  DM ed yesterday for Dexcom teaching -- so she only began wearing CGM yesterday.      T2DM  Diagnosed early in current pregnancy with 1st trimester A1c 7.1%; pt reportedly unaware of dx prior to this. However A1c was also 7.1% in 9/2023.  preDM before, she denies DM or GDM in prior pregnancies   Known complications: gastroparesis    Current Diabetes Regimen:  Metformin 1000 mg BID  Lantus 36u qhs  Novolog [facility]; eats 2 meals/day, 2-3u is what she is typically needing     Taking insulin consistently - occ misses one of  "the metformin doses due to SE - vomiting diarrhea    Prior mediations tried:  none    Diet/Exercise:  Working on diet  Biggest meal in the evening - prev was working overnight but been on day schedule    Recent Hgb A1C:  Lab Results   Component Value Date    HGBA1C 7.3 (H) 11/14/2024       Glucose Monitoring:  Before she started CGM yesterday she was checking fingersticks 6x/day  Fasting 120 was the highest, usually 100-115, this morning 101  2hr postprandial - 140s-170s usually  Dexcom G7 started yesterday with one day of data reviewed. Postprandial glucose above  goals for pregnancy.      Hypoglycemic Episodes: no lows, we discussed how to correct lows     Screening / DM Complications:    Nephropathy:  ACEi/ARB: Not taking  No results found for: "MICALBCREAT"    Last Lipid Panel:  Statin: Not taking  No results found for: "LDLCALC"    Last foot exam Most Recent Foot Exam Date: Not Found  Last eye exam Most Recent Eye Exam Date: Not Found;  annually for contacts and glasses, thinks she has an appt coming up; no laser surgery or DR    B12:  No results found for: "[id number]"      ROS:   As above    Objective:     LMP 06/26/2024 (Exact Date)   BP Readings from Last 3 Encounters:   12/24/24 136/86   12/18/24 118/78   12/12/24 118/78     Wt Readings from Last 1 Encounters:   01/07/25 1230 104.3 kg (229 lb 15 oz)     There is no height or weight on file to calculate BMI.    Physical Exam  Constitutional:       Appearance: She is obese.   HENT:      Head: Normocephalic.      Mouth/Throat:      Mouth: Mucous membranes are moist.   Pulmonary:      Effort: Pulmonary effort is normal.   Neurological:      Mental Status: She is alert and oriented to person, place, and time.   Psychiatric:         Mood and Affect: Mood normal.         Behavior: Behavior normal.       Lab Review:   Lab Results   Component Value Date    HGBA1C 7.3 (H) 11/14/2024     No results found for: "CHOL", "HDL", "LDLCALC", "TRIG", "CHOLHDL"  Lab Results " "  Component Value Date     09/06/2023    K 3.9 09/06/2023     09/06/2023    CO2 24 09/06/2023     09/06/2023    BUN 7 09/06/2023    CREATININE 0.8 09/06/2023    CALCIUM 9.4 09/06/2023    PROT 7.3 01/06/2020    ALBUMIN 3.7 01/06/2020    BILITOT 0.3 01/06/2020    ALKPHOS 70 01/06/2020    AST 39 01/06/2020    ALT 51 (H) 01/06/2020    ANIONGAP 11 09/06/2023    ESTGFRAFRICA >60 01/06/2020    EGFRNONAA >60 01/06/2020     No results found for: "[id number]"      All relevant labs and imaging reviewed.    Assessment and Plan     Pre-existing type 2 diabetes mellitus during pregnancy in second trimester  Pre-gestational T2DM with BG not at goal for pregnancy on metformin and basal insulin and Novolog MDC started last time with plan to add scheduled prandial based on requirements -- did not have BG log to review last visit   Only one day of dexcom data and postprandial is above goal for pregnancy and fasting is als oabove our goal of <95, but not by much  Continue max dose metformin  Continue same basal for now and add Novolog 5u AC + correction scale modified for pregnancy goals below   I will continue to see her for virtual visits frequently (q1-2wks) until we have better glycemic control then can space out visits a bit    Last visit we discussed need for eye exam as well as foot exam  We discussed risks of DM in pregnancy - especially if BG uncontrolled     Patient Instructions   Continue Lantus 36 units nightly    Add a set amount of Novolog before each meal plus the sliding scale if your pre-meal sugar is more than 130. This means you will gibe yourself Novolog 5 units about 10-15 min before each meal. If your sugar is >130 before the meal, add the additional units based on the scale below to the 5 units.  Novolog 5 units before each meal plus sliding scale if your pre-meal sugar is above 130  Novolog correction based on before meal glucose and bedtime glucose:  130-180: add 1 unit  181-230: add 2 " units  231-280: add 3 units  281-330: add 4 units  >330: add 5 units    Continue metformin 1000 mg twice daily     Follow up with me next week, Jan 15th at 4:30pm for a virtual visit         Mixed hyperlipidemia  Will need to address postpartum         F/u 1wk, 1/15 for virtual visit at 4:30pm      Tiki Kwon MD  [facility]    Visit today included increased complexity associated with the care of the problems addressed and managing the longitudinal care of the patient due to the serious and/or complex managed problems

## 2025-01-08 NOTE — PATIENT INSTRUCTIONS
Continue Lantus 36 units nightly    Add a set amount of Novolog before each meal plus the sliding scale if your pre-meal sugar is more than 130. This means you will gibe yourself Novolog 5 units about 10-15 min before each meal. If your sugar is >130 before the meal, add the additional units based on the scale below to the 5 units.  Novolog 5 units before each meal plus sliding scale if your pre-meal sugar is above 130  Novolog correction based on before meal glucose and bedtime glucose:  130-180: add 1 unit  181-230: add 2 units  231-280: add 3 units  281-330: add 4 units  >330: add 5 units    Continue metformin 1000 mg twice daily     Follow up with me next week, Jan 15th at 4:30pm for a virtual visit

## 2025-01-08 NOTE — PROGRESS NOTES
Doing well. Just got dexcom and is liking it though can't figure out how to see blood sugars by the hour but has appt with endocrinology today.     @ 28 0/7 wga  IOB labs done   Consents done  3rd trim labs ordered   Discussed RSV and tdap vaccines, will think about it    Type 2 DM: Patient reports never formally dx with type 2 dm  1st trimester A1C: 7.1;   2nd trimester: 7.3  Reviewed range, goes up to 170 about an hour after eating but overall normal.   On metformin 1000 BID(sometimes can't tolerate it) lantus 35U QHS and SSI before meals per endocrinology   130-180: add 2 unit  181-230: add 4 units  231-280: add 6 units  281-330: add 8 units  >330: add 10 units  Seeing endo and set up for eye and foot exam    peds echo done 12/3,  echo and appears normal, recommend work up after delivery if any concern   EKG for mom, has cardiology appt   On ASA   Seeing MFM   AMA:   -  AFP negative    - MT21  negative     Hidradenitis suppurativa: was on humara,      Leep with HPV + pap smear   Colpo done in 1st trimester normal    Normal CL    H/o  x2   Consents signed for repeat , she is considering doing TOLAC, discussed risks and benefits, including risk of uterine rupture, she will think about it    Smoking: is cutting down and down form 1 pack to 1/3 pack per day. Encouraged to continue  Short cervix  Doing vaginal progesterone   Did CL, normal   Fetal PAC:   Has decreased caffeine, understand recommendations for more frequent fetal monitoring however not able to come more frequently since stays out of town most of the time.    delivery: Previous pregnancy with oligo and delivery at 32 weeks (records from 2017 that can see suggest fetal growth restriction with velamentous cord insertion)  Undesired fertility: medicaid consent signed   Low lying placenta: repeat US set up   Pilonidial cyst: has I&D several times and appt set up with general surgery         Face to Face time with patient: 20  minutes of total time spent on the encounter, which includes face to face time and non-face to face time preparing to see the patient (eg, review of tests), Obtaining and/or reviewing separately obtained history, Documenting clinical information in the electronic or other health record, Independently interpreting results (not separately reported) and communicating results to the patient/family/caregiver, or Care coordination (not separately reported).

## 2025-01-10 NOTE — ASSESSMENT & PLAN NOTE
Pre-gestational T2DM with BG not at goal for pregnancy on metformin and basal insulin and Novolog MDC started last time with plan to add scheduled prandial based on requirements -- did not have BG log to review last visit   Only one day of dexcom data and postprandial is above goal for pregnancy and fasting is als oabove our goal of <95, but not by much  Continue max dose metformin  Continue same basal for now and add Novolog 5u AC + correction scale modified for pregnancy goals below   I will continue to see her for virtual visits frequently (q1-2wks) until we have better glycemic control then can space out visits a bit    Last visit we discussed need for eye exam as well as foot exam  We discussed risks of DM in pregnancy - especially if BG uncontrolled     Patient Instructions   Continue Lantus 36 units nightly    Add a set amount of Novolog before each meal plus the sliding scale if your pre-meal sugar is more than 130. This means you will gibe yourself Novolog 5 units about 10-15 min before each meal. If your sugar is >130 before the meal, add the additional units based on the scale below to the 5 units.  Novolog 5 units before each meal plus sliding scale if your pre-meal sugar is above 130  Novolog correction based on before meal glucose and bedtime glucose:  130-180: add 1 unit  181-230: add 2 units  231-280: add 3 units  281-330: add 4 units  >330: add 5 units    Continue metformin 1000 mg twice daily     Follow up with me next week, Jan 15th at 4:30pm for a virtual visit

## 2025-01-14 NOTE — PROGRESS NOTES
I overbooked her at 4:30p when I do not usually see patients to accommodate her schedule. Pt logged in >10 min late so appointment will be rescheduled to next week.     Tiki Kwon

## 2025-01-15 ENCOUNTER — OFFICE VISIT (OUTPATIENT)
Dept: ENDOCRINOLOGY | Facility: CLINIC | Age: 35
End: 2025-01-15
Payer: MEDICAID

## 2025-01-15 DIAGNOSIS — O24.112 PRE-EXISTING TYPE 2 DIABETES MELLITUS DURING PREGNANCY IN SECOND TRIMESTER: Primary | ICD-10-CM

## 2025-01-15 PROCEDURE — 99499 UNLISTED E&M SERVICE: CPT | Mod: 95,,, | Performed by: STUDENT IN AN ORGANIZED HEALTH CARE EDUCATION/TRAINING PROGRAM

## 2025-01-20 ENCOUNTER — PATIENT MESSAGE (OUTPATIENT)
Dept: OBSTETRICS AND GYNECOLOGY | Facility: CLINIC | Age: 35
End: 2025-01-20
Payer: MEDICAID

## 2025-01-21 ENCOUNTER — TELEPHONE (OUTPATIENT)
Dept: OBSTETRICS AND GYNECOLOGY | Facility: HOSPITAL | Age: 35
End: 2025-01-21
Payer: MEDICAID

## 2025-01-21 ENCOUNTER — OFFICE VISIT (OUTPATIENT)
Dept: OBSTETRICS AND GYNECOLOGY | Facility: CLINIC | Age: 35
End: 2025-01-21
Payer: MEDICAID

## 2025-01-21 DIAGNOSIS — L73.2 AXILLARY HIDRADENITIS SUPPURATIVA: ICD-10-CM

## 2025-01-21 DIAGNOSIS — Z3A.29 29 WEEKS GESTATION OF PREGNANCY: Primary | ICD-10-CM

## 2025-01-21 DIAGNOSIS — E11.9 TYPE 2 DIABETES MELLITUS WITHOUT COMPLICATION, WITHOUT LONG-TERM CURRENT USE OF INSULIN: ICD-10-CM

## 2025-01-21 DIAGNOSIS — E11.9 TYPE 2 DIABETES MELLITUS WITHOUT COMPLICATION, WITHOUT LONG-TERM CURRENT USE OF INSULIN: Primary | ICD-10-CM

## 2025-01-21 RX ORDER — CEPHALEXIN 500 MG/1
500 CAPSULE ORAL EVERY 12 HOURS
Qty: 60 CAPSULE | Refills: 1 | Status: SHIPPED | OUTPATIENT
Start: 2025-01-21 | End: 2025-03-22

## 2025-01-21 NOTE — TELEPHONE ENCOUNTER
Pls set up for NSTs alternating with her BPPs starting at 32 weeks. She wasn't sure if all the US dates would work for her so we may need to review them to be sure. Thanks

## 2025-01-21 NOTE — PROGRESS NOTES
The patient location is: [place], [place]   The chief complaint leading to consultation is: f/u ob    Visit type: audiovisual    Face to Face time with patient:   15 minutes of total time spent on the encounter, which includes face to face time and non-face to face time preparing to see the patient (eg, review of tests), Obtaining and/or reviewing separately obtained history, Documenting clinical information in the electronic or other health record, Independently interpreting results (not separately reported) and communicating results to the patient/family/caregiver, or Care coordination (not separately reported).         Each patient to whom he or she provides medical services by telemedicine is:  (1) informed of the relationship between the physician and patient and the respective role of any other health care provider with respect to management of the patient; and (2) notified that he or she may decline to receive medical services by telemedicine and may withdraw from such care at any time.    Notes:      Doing well. Just got dexcom and is liking it though can't figure out how to see blood sugars by the hour but has appt with endocrinology today.     @ 29 6/7 wga  IOB labs done   Consents done  3rd trim labs ordered   Discussed RSV and tdap vaccines, will think about it    Type 2 DM: Patient reports never formally dx with type 2 dm  1st trimester A1C: 7.1;   2nd trimester: 7.3  3rd trim pending   Reviewed range, goes up to 170 about an hour after eating but overall normal.   On metformin 1000 BID(sometimes can't tolerate it) lantus 36U QHS and 5 U aspart before meals; stopped the SSI before meals per endocrinology   130-180: add 2 unit  181-230: add 4 units  231-280: add 6 units  281-330: add 8 units  >330: add 10 units  Seeing endo and set up for eye and foot exam    peds echo done 12/3,  echo and appears normal, recommend work up after delivery if any concern   EKG for mom, has cardiology appt   On ASA   Seeing  MFM   Set up 2x/wk testing at 32 weeks and weekly appt with either me or MFM   AMA:   -  AFP negative    - MT21  negative     Hidradenitis suppurativa: was on humara, but stopped during pregnancy     Leep with HPV + pap smear   Colpo done in 1st trimester normal    Normal CL    H/o  x2   Consents signed for repeat , she is considering doing TOLAC, discussed risks and benefits, including risk of uterine rupture, she will think about it    Smoking: is cutting down and down form 1 pack to 1/3 pack per day. Encouraged to continue  Short cervix  Doing vaginal progesterone   Did CL, normal   Fetal PAC:   Has decreased caffeine, understand recommendations for more frequent fetal monitoring however not able to come more frequently since stays out of town most of the time.    delivery: Previous pregnancy with oligo and delivery at 32 weeks (records from 2017 that can see suggest fetal growth restriction with velamentous cord insertion)  Undesired fertility: medicaid consent signed   Low lying placenta: repeat US set up at 32 wga  Pilonidial cyst: has I&D several times and appt set up with general surgery, will continue keflex for now because seems to be helping         Face to Face time with patient: 30 minutes of total time spent on the encounter, which includes face to face time and non-face to face time preparing to see the patient (eg, review of tests), Obtaining and/or reviewing separately obtained history, Documenting clinical information in the electronic or other health record, Independently interpreting results (not separately reported) and communicating results to the patient/family/caregiver, or Care coordination (not separately reported).

## 2025-01-22 ENCOUNTER — PATIENT MESSAGE (OUTPATIENT)
Dept: OTHER | Facility: OTHER | Age: 35
End: 2025-01-22
Payer: MEDICAID

## 2025-01-22 ENCOUNTER — OFFICE VISIT (OUTPATIENT)
Dept: ENDOCRINOLOGY | Facility: CLINIC | Age: 35
End: 2025-01-22
Payer: MEDICAID

## 2025-01-22 DIAGNOSIS — O24.113 PRE-EXISTING TYPE 2 DIABETES MELLITUS DURING PREGNANCY IN THIRD TRIMESTER: Primary | ICD-10-CM

## 2025-01-22 NOTE — ASSESSMENT & PLAN NOTE
Reviewed dexcom G7 log -- fasting BG above goal, usually low 100s -- 100-110; goal fasting glucose <95. Will increase basal insulin dose to 40 units nightly.    Prandial spikes above goal but often BG at goal at 1 and 2 hrs postprandial. Will increase Novolog slightly -- 7u AC, + continued custom correction scale below. We also discussed later in pregnancy, may need to give mealtime bolus even earlier than 10-15 min -- sometimes up to 30 min before. She will try to back up the pre-bolus a bit, to 15-20 min before meals.    Due for A1c -- ordered by OB and she will have labs done next week.  We reviewed recs for eye exam qTrimester   Need to re-evaluate lipids and uACR postpartum    Delivery planning for 37-38 wks  Pre-pregnancy she was on metformin only. She does plan to breastfeed. May be able to just use metformin postpartum but I suspect she will need at lease some basal insulin as well. Once she is done breastfeeding I would recommend starting Mounjaro or a GLP1 agonists and weaning down/off insulin. Will provide final postpartum DM recs a bit closer to delivery.    I asked the pt to message me in 1 wk to let me know how she is doing and remind me to review her BG and then she will f/u with me for her next virtual visit in 2wks.    Patient Instructions   Metformin 1000 mg BID    Increase Lantus to 40u qhs    Increase Novolog 7u AC + correction scale below  Novolog correction based on before meal glucose and bedtime glucose:  130-180: add 1 unit  181-230: add 2 units  231-280: add 3 units  281-330: add 4 units  >330: add 5 units    Try to give Novolog 15-20 min before eating     Follow up on 2/5/2025 at 3:45 pm

## 2025-01-22 NOTE — PROGRESS NOTES
Endocrinology Return Visit   01/22/2025  The patient location is: home  Visit type: audiovisual    Face to Face time with patient: 13 minutes  20 minutes of total time spent on the encounter, which includes face to face time and non-face to face time preparing to see the patient (eg, review of tests), Obtaining and/or reviewing separately obtained history, Documenting clinical information in the electronic or other health record, Independently interpreting results (not separately reported) and communicating results to the patient/family/caregiver, or Care coordination (not separately reported).     Each patient to whom he or she provides medical services by telemedicine is:  (1) informed of the relationship between the physician and patient and the respective role of any other health care provider with respect to management of the patient; and (2) notified that he or she may decline to receive medical services by telemedicine and may withdraw from such care at any time.    Subjective:      Chief Complaint:  Diabetes      History of Present Illness  Jose Hernandez is a 34 y.o. female with tobacco use d/o, T2DM, obesity, hidradenitis suppurativa (was on Humira before pregnancy) referred by No ref. provider found for evaluation of T2DM.    Pre-pregnancy she was on metformin only for her DM  She is currently pregnant, gestational age 30w0d.  Pregnancy c/b low lying placenta, hx C/S x2, tobacco use, short cervix, and obesity   Delivery planned between 37-38 wks. She does plan to breastfeed.     Today she reports: doing well overall. BG spikes high with meals but comes down quickly and is often at goal at 1hr an 2hrs postprandial.   Fasting -110 typically.       T2DM  Diagnosed early in current pregnancy with 1st trimester A1c 7.1%; pt reportedly unaware of dx prior to this. However A1c was also 7.1% in 9/2023.  preDM before, she denies DM or GDM in prior pregnancies   Known complications:  "gastroparesis    Current Diabetes Regimen:  Metformin 1000 mg BID -- not taking consistently -- if she has diarrhea she holds the metformin for 1-2 days since it will worsen her diarrhea   Lantus 36u qhs  Novolog 5u AC + correction scale below  Novolog correction based on before meal glucose and bedtime glucose:  130-180: add 1 unit  181-230: add 2 units  231-280: add 3 units  281-330: add 4 units  >330: add 5 units      Giving Novolog 10-15 min before meals  Taking insulin consistently - occ misses one of the metformin doses due to SE - vomiting diarrhea    Prior mediations tried:  none    Diet/Exercise:  Working on diet  Biggest meal in the evening - prev was working overnight but been on day schedule and eating 3 meals/day more consistently at typical meal times.     Recent Hgb A1C:  Lab Results   Component Value Date    HGBA1C 7.3 (H) 11/14/2024       Glucose Monitoring: Dexcom G7 report reviewed and uploaded under media.  Fasting sugars are often slightly above goal. Also with some postprandial highs as well.       Hypoglycemic Episodes: no lows, we discussed how to correct lows     Screening / DM Complications:    Nephropathy:  ACEi/ARB: Not taking  No results found for: "MICALBCREAT"    Last Lipid Panel:  Statin: Not taking; she states elevated cholesterol pre-pregnancy. Will need to re-evaluate lipids postpartum.  No results found for: "LDLCALC"    Last foot exam Most Recent Foot Exam Date: Not Found  Last eye exam Most Recent Eye Exam Date: Not Found;  annually in the spring for contacts and glasses, no laser surgery or DR; saw them within 1yr    B12:  No results found for: "[id number]"      ROS:   As above    Objective:     LMP 06/26/2024 (Exact Date)   BP Readings from Last 3 Encounters:   12/24/24 136/86   12/18/24 118/78   12/12/24 118/78     Wt Readings from Last 1 Encounters:   01/08/25 1310 106.1 kg (234 lb 0.3 oz)     There is no height or weight on file to calculate BMI.    Physical Exam  HENT:     " " Head: Normocephalic.      Mouth/Throat:      Mouth: Mucous membranes are moist.   Pulmonary:      Effort: Pulmonary effort is normal.   Neurological:      Mental Status: She is alert and oriented to person, place, and time.   Psychiatric:         Mood and Affect: Mood normal.         Behavior: Behavior normal.       Lab Review:   Lab Results   Component Value Date    HGBA1C 7.3 (H) 11/14/2024     No results found for: "CHOL", "HDL", "LDLCALC", "TRIG", "CHOLHDL"  Lab Results   Component Value Date     09/06/2023    K 3.9 09/06/2023     09/06/2023    CO2 24 09/06/2023     09/06/2023    BUN 7 09/06/2023    CREATININE 0.8 09/06/2023    CALCIUM 9.4 09/06/2023    PROT 7.3 01/06/2020    ALBUMIN 3.7 01/06/2020    BILITOT 0.3 01/06/2020    ALKPHOS 70 01/06/2020    AST 39 01/06/2020    ALT 51 (H) 01/06/2020    ANIONGAP 11 09/06/2023    ESTGFRAFRICA >60 01/06/2020    EGFRNONAA >60 01/06/2020     No results found for: "[id number]"      All relevant labs and imaging reviewed.    Assessment and Plan     Pre-existing type 2 diabetes mellitus during pregnancy in third trimester  Reviewed dexcom G7 log -- fasting BG above goal, usually low 100s -- 100-110; goal fasting glucose <95. Will increase basal insulin dose to 40 units nightly.    Prandial spikes above goal but often BG at goal at 1 and 2 hrs postprandial. Will increase Novolog slightly -- 7u AC, + continued custom correction scale below. We also discussed later in pregnancy, may need to give mealtime bolus even earlier than 10-15 min -- sometimes up to 30 min before. She will try to back up the pre-bolus a bit, to 15-20 min before meals.    Due for A1c -- ordered by OB and she will have labs done next week.  We reviewed recs for eye exam qTrimester   Need to re-evaluate lipids and uACR postpartum    Delivery planning for 37-38 wks  Pre-pregnancy she was on metformin only. She does plan to breastfeed. May be able to just use metformin postpartum but I " suspect she will need at lease some basal insulin as well. Once she is done breastfeeding I would recommend starting Mounjaro or a GLP1 agonists and weaning down/off insulin. Will provide final postpartum DM recs a bit closer to delivery.    I asked the pt to message me in 1 wk to let me know how she is doing and remind me to review her BG and then she will f/u with me for her next virtual visit in 2wks.    Patient Instructions   Metformin 1000 mg BID    Increase Lantus to 40u qhs    Increase Novolog 7u AC + correction scale below  Novolog correction based on before meal glucose and bedtime glucose:  130-180: add 1 unit  181-230: add 2 units  231-280: add 3 units  281-330: add 4 units  >330: add 5 units    Try to give Novolog 15-20 min before eating     Follow up on 2/5/2025 at 3:45 pm         F/u 1wk, 1/15 for virtual visit at 4:30pm      Tiki Kwon MD  [facility]    Visit today included increased complexity associated with the care of the problems addressed and managing the longitudinal care of the patient due to the serious and/or complex managed problems

## 2025-01-22 NOTE — Clinical Note
Can y'all please schedule her for a virtual f/u with me on Wednesday 2/5 at 3:45pm? (I know it is an unusual time -- she is pregnant so I'm working around her schedule to make sure I see her frequently) - thank you!

## 2025-01-22 NOTE — PATIENT INSTRUCTIONS
Metformin 1000 mg BID    Increase Lantus to 40u qhs    Increase Novolog 7u AC + correction scale below  Novolog correction based on before meal glucose and bedtime glucose:  130-180: add 1 unit  181-230: add 2 units  231-280: add 3 units  281-330: add 4 units  >330: add 5 units    Try to give Novolog 15-20 min before eating     Follow up on 2/5/2025 at 3:45 pm

## 2025-01-28 ENCOUNTER — PATIENT MESSAGE (OUTPATIENT)
Dept: OBSTETRICS AND GYNECOLOGY | Facility: CLINIC | Age: 35
End: 2025-01-28
Payer: MEDICAID

## 2025-01-28 ENCOUNTER — TELEPHONE (OUTPATIENT)
Dept: OBSTETRICS AND GYNECOLOGY | Facility: CLINIC | Age: 35
End: 2025-01-28
Payer: MEDICAID

## 2025-01-28 NOTE — TELEPHONE ENCOUNTER
Called patient to be informed on where she wanted her breast pump request to be sent to and she stated that she wasn't sure and will inform us when she goes through her emails. Upon speaking to the patient she also stated that she wasn't able to make her next routine ob appointment due to her being in school during that time and we canceled it. She would like to discuss NST and ultrasound appointments. Her ultasound appointments and NST are now scheduled. Patient would like to speak to provider for further information.

## 2025-01-28 NOTE — TELEPHONE ENCOUNTER
Hey,     She needs alternating NST and BPP however she need to speak with someone about scheduling them because has to coordinate with work schedule. Can you help with that? Thanks!

## 2025-01-31 NOTE — TELEPHONE ENCOUNTER
Called pt to go over dates of all appointments. Pt stated for the month of February she can come in for appts at 8:30-9:30 or 11:30-12:30. I explained to the patient that it is three different schedules and we will try our best to get the appointments at the requested times.

## 2025-02-05 ENCOUNTER — PROCEDURE VISIT (OUTPATIENT)
Dept: MATERNAL FETAL MEDICINE | Facility: CLINIC | Age: 35
End: 2025-02-05
Payer: MEDICAID

## 2025-02-05 ENCOUNTER — OFFICE VISIT (OUTPATIENT)
Dept: MATERNAL FETAL MEDICINE | Facility: CLINIC | Age: 35
End: 2025-02-05
Attending: OBSTETRICS & GYNECOLOGY
Payer: MEDICAID

## 2025-02-05 ENCOUNTER — PATIENT MESSAGE (OUTPATIENT)
Dept: OTHER | Facility: OTHER | Age: 35
End: 2025-02-05
Payer: MEDICAID

## 2025-02-05 ENCOUNTER — PATIENT MESSAGE (OUTPATIENT)
Dept: ENDOCRINOLOGY | Facility: CLINIC | Age: 35
End: 2025-02-05

## 2025-02-05 ENCOUNTER — OFFICE VISIT (OUTPATIENT)
Dept: ENDOCRINOLOGY | Facility: CLINIC | Age: 35
End: 2025-02-05
Payer: MEDICAID

## 2025-02-05 DIAGNOSIS — O24.113 PRE-EXISTING TYPE 2 DIABETES MELLITUS DURING PREGNANCY IN THIRD TRIMESTER: Primary | ICD-10-CM

## 2025-02-05 DIAGNOSIS — Z36.89 ENCOUNTER FOR ULTRASOUND TO ASSESS FETAL GROWTH: ICD-10-CM

## 2025-02-05 DIAGNOSIS — O24.113 PRE-EXISTING TYPE 2 DIABETES MELLITUS DURING PREGNANCY IN THIRD TRIMESTER: ICD-10-CM

## 2025-02-05 DIAGNOSIS — O24.313 PRE-EXISTING DIABETES MELLITUS DURING PREGNANCY IN THIRD TRIMESTER: Primary | ICD-10-CM

## 2025-02-05 DIAGNOSIS — O36.8390 FETAL ARRHYTHMIA AFFECTING PREGNANCY, ANTEPARTUM: ICD-10-CM

## 2025-02-05 DIAGNOSIS — O44.40 LOW-LYING PLACENTA: ICD-10-CM

## 2025-02-05 PROCEDURE — 99211 OFF/OP EST MAY X REQ PHY/QHP: CPT | Mod: PBBFAC,TH | Performed by: OBSTETRICS & GYNECOLOGY

## 2025-02-05 PROCEDURE — 98006 SYNCH AUDIO-VIDEO EST MOD 30: CPT | Mod: 95,,, | Performed by: STUDENT IN AN ORGANIZED HEALTH CARE EDUCATION/TRAINING PROGRAM

## 2025-02-05 PROCEDURE — 76817 TRANSVAGINAL US OBSTETRIC: CPT | Mod: 26,S$PBB,, | Performed by: OBSTETRICS & GYNECOLOGY

## 2025-02-05 PROCEDURE — 76819 FETAL BIOPHYS PROFIL W/O NST: CPT | Mod: PBBFAC | Performed by: OBSTETRICS & GYNECOLOGY

## 2025-02-05 PROCEDURE — 76819 FETAL BIOPHYS PROFIL W/O NST: CPT | Mod: 26,S$PBB,, | Performed by: OBSTETRICS & GYNECOLOGY

## 2025-02-05 PROCEDURE — 95251 CONT GLUC MNTR ANALYSIS I&R: CPT | Mod: NDTC,,, | Performed by: STUDENT IN AN ORGANIZED HEALTH CARE EDUCATION/TRAINING PROGRAM

## 2025-02-05 PROCEDURE — 99211 OFF/OP EST MAY X REQ PHY/QHP: CPT | Mod: PBBFAC,TH,25 | Performed by: OBSTETRICS & GYNECOLOGY

## 2025-02-05 PROCEDURE — 76816 OB US FOLLOW-UP PER FETUS: CPT | Mod: 26,S$PBB,, | Performed by: OBSTETRICS & GYNECOLOGY

## 2025-02-05 PROCEDURE — 76816 OB US FOLLOW-UP PER FETUS: CPT | Mod: PBBFAC | Performed by: OBSTETRICS & GYNECOLOGY

## 2025-02-05 PROCEDURE — 99213 OFFICE O/P EST LOW 20 MIN: CPT | Mod: S$PBB,TH,, | Performed by: OBSTETRICS & GYNECOLOGY

## 2025-02-05 PROCEDURE — 1159F MED LIST DOCD IN RCRD: CPT | Mod: CPTII,,, | Performed by: OBSTETRICS & GYNECOLOGY

## 2025-02-05 PROCEDURE — 76817 TRANSVAGINAL US OBSTETRIC: CPT | Mod: PBBFAC | Performed by: OBSTETRICS & GYNECOLOGY

## 2025-02-05 PROCEDURE — G2211 COMPLEX E/M VISIT ADD ON: HCPCS | Mod: 95,,, | Performed by: STUDENT IN AN ORGANIZED HEALTH CARE EDUCATION/TRAINING PROGRAM

## 2025-02-05 PROCEDURE — 1160F RVW MEDS BY RX/DR IN RCRD: CPT | Mod: CPTII,95,, | Performed by: STUDENT IN AN ORGANIZED HEALTH CARE EDUCATION/TRAINING PROGRAM

## 2025-02-05 PROCEDURE — 99999 PR PBB SHADOW E&M-EST. PATIENT-LVL I: CPT | Mod: PBBFAC,,, | Performed by: OBSTETRICS & GYNECOLOGY

## 2025-02-05 PROCEDURE — 1160F RVW MEDS BY RX/DR IN RCRD: CPT | Mod: CPTII,,, | Performed by: OBSTETRICS & GYNECOLOGY

## 2025-02-05 PROCEDURE — 1159F MED LIST DOCD IN RCRD: CPT | Mod: CPTII,95,, | Performed by: STUDENT IN AN ORGANIZED HEALTH CARE EDUCATION/TRAINING PROGRAM

## 2025-02-05 RX ORDER — INSULIN ASPART 100 [IU]/ML
INJECTION, SOLUTION INTRAVENOUS; SUBCUTANEOUS
Qty: 15 ML | Refills: 6 | Status: SHIPPED | OUTPATIENT
Start: 2025-02-05

## 2025-02-05 RX ORDER — INSULIN GLARGINE 300 [IU]/ML
INJECTION, SOLUTION SUBCUTANEOUS
Start: 2025-02-05

## 2025-02-05 RX ORDER — PEN NEEDLE, DIABETIC 30 GX3/16"
1 NEEDLE, DISPOSABLE MISCELLANEOUS DAILY
Qty: 100 EACH | Refills: 6 | Status: SHIPPED | OUTPATIENT
Start: 2025-02-05

## 2025-02-05 NOTE — PROGRESS NOTES
Endocrinology Return Visit   02/05/2025  The patient location is: home  Visit type: audiovisual    Face to Face time with patient: 15 minutes  30 minutes of total time spent on the encounter, which includes face to face time and non-face to face time preparing to see the patient (eg, review of tests), Obtaining and/or reviewing separately obtained history, Documenting clinical information in the electronic or other health record, Independently interpreting results (not separately reported) and communicating results to the patient/family/caregiver, or Care coordination (not separately reported).     Each patient to whom he or she provides medical services by telemedicine is:  (1) informed of the relationship between the physician and patient and the respective role of any other health care provider with respect to management of the patient; and (2) notified that he or she may decline to receive medical services by telemedicine and may withdraw from such care at any time.    Subjective:      Chief Complaint:  Diabetes      History of Present Illness  Jose Hernandez is a 34 y.o. female with tobacco use d/o, T2DM, obesity, hidradenitis suppurativa (was on Humira before pregnancy) referred by No ref. provider found for evaluation of T2DM.    Pre-pregnancy she was on metformin only for her DM  She is currently pregnant, gestational age 32w0d.  Pregnancy c/b low lying placenta, hx C/S x2, tobacco use, short cervix, and obesity   Delivery planned between 37-38 wks. She does plan to breastfeed.     I last saw her 1/22/2025. At that time we increased Toujeo and Novolog.    Today she reports overall doing well. Numbers are about the same despite increased doses last time. One episode where dexcom said low o/n but she was asymptomatic - possibly compression low.      T2DM  Diagnosed early in current pregnancy with 1st trimester A1c 7.1%; pt reportedly unaware of dx prior to this. However A1c was also 7.1% in  "9/2023.  preDM before, she denies DM or GDM in prior pregnancies   Known complications: gastroparesis    Current Diabetes Regimen:  Metformin 1000 mg BID -- not taking this anymore due to diarrhea  Toujeo 40u qhs  Novolog 7u AC + correction scale below  Novolog correction based on before meal glucose and bedtime glucose:  130-180: add 1 unit  181-230: add 2 units  231-280: add 3 units  281-330: add 4 units  >330: add 5 units    Giving Novolog 15 min before meals most of the time, occ right before she eats   Taking insulin consistently - occ misses one of the metformin doses due to SE - vomiting diarrhea    Prior mediations tried:  none    Diet/Exercise:  Working on diet  Biggest meal in the evening - prev was working overnight but been on day schedule and eating 3 meals/day more consistently at typical meal times.   Breakfast - 8-9am on work days, usually coffee + banana, occ a sausage biscuit  Snacks at work - crackers  Lunch - leftovers from dinner   Dinner - mostly at home home cooked meals     Recent Hgb A1C:  Lab Results   Component Value Date    HGBA1C 7.3 (H) 11/14/2024       Glucose Monitoring: Dexcom G7 report reviewed and uploaded under media.  Similar to last time - fasting sugars mostly slightly above  goal and still with postprandial spikes above goal but overall doing very well.       Hypoglycemic Episodes: One episode where dexcom said low o/n but she was asymptomatic - possibly compression low.  She knows how to correct lows with 15g of sugar    Screening / DM Complications:    Nephropathy:  ACEi/ARB: Not taking  No results found for: "MICALBCREAT"    Last Lipid Panel:  Statin: Not taking; she states elevated cholesterol pre-pregnancy. Will need to re-evaluate lipids postpartum.  No results found for: "LDLCALC"    Last foot exam Most Recent Foot Exam Date: Not Found  Last eye exam Most Recent Eye Exam Date: Not Found;  annually in the spring for contacts and glasses, no laser surgery or DR; saw them " "within 1yr    B12:  No results found for: "[id number]"      ROS:   As above    Objective:     LMP 06/26/2024 (Exact Date)   BP Readings from Last 3 Encounters:   12/24/24 136/86   12/18/24 118/78   12/12/24 118/78     Wt Readings from Last 1 Encounters:   01/08/25 1310 106.1 kg (234 lb 0.3 oz)     There is no height or weight on file to calculate BMI.    Physical Exam  HENT:      Head: Normocephalic.      Mouth/Throat:      Mouth: Mucous membranes are moist.   Pulmonary:      Effort: Pulmonary effort is normal.   Neurological:      Mental Status: She is alert and oriented to person, place, and time.   Psychiatric:         Mood and Affect: Mood normal.         Behavior: Behavior normal.       Lab Review:   Lab Results   Component Value Date    HGBA1C 7.3 (H) 11/14/2024     No results found for: "CHOL", "HDL", "LDLCALC", "TRIG", "CHOLHDL"  Lab Results   Component Value Date     09/06/2023    K 3.9 09/06/2023     09/06/2023    CO2 24 09/06/2023     09/06/2023    BUN 7 09/06/2023    CREATININE 0.8 09/06/2023    CALCIUM 9.4 09/06/2023    PROT 7.3 01/06/2020    ALBUMIN 3.7 01/06/2020    BILITOT 0.3 01/06/2020    ALKPHOS 70 01/06/2020    AST 39 01/06/2020    ALT 51 (H) 01/06/2020    ANIONGAP 11 09/06/2023    ESTGFRAFRICA >60 01/06/2020    EGFRNONAA >60 01/06/2020     No results found for: "[id number]"      All relevant labs and imaging reviewed.    Assessment and Plan     1. Pre-existing diabetes mellitus during pregnancy in third trimester  -     insulin aspart U-100 (NOVOLOG FLEXPEN U-100 INSULIN) 100 unit/mL (3 mL) InPn pen; Inject 10 units plus sliding scale into the skin before meals and at bedtime based on sliding scale. Max TDD 75u  Dispense: 15 mL; Refill: 6  -     pen needle, diabetic 32 gauge x 5/32" Ndle; 1 each by Misc.(Non-Drug; Combo Route) route once daily.  Dispense: 100 each; Refill: 6  -     insulin glargine U-300 conc (TOUJEO MAX SOLOSTAR) 300 unit/mL (3 mL) insulin pen; Inject 24 " units into the skin at twice daily, in the morning and at bedtime    2. Pre-existing type 2 diabetes mellitus during pregnancy in third trimester  Overview:  DM Type II   -Metformin 500 mg bid   -Labs:    08-16-24:  Plts 374k   Cr 0.6   AST/ALT 17/26 08-20-24: hb electrophoresis normal     08-20-24:  Plts 378k  Hb A1c 7.1%    Assessment & Plan:  Reviewed dexcom G7 data -- fasting BG above goal, usually low 100s -- 100-110; goal fasting glucose <95. Will increase basal insulin dose to 24u BID (splitting into BID dosing since now >40u basal)    Still with similar prandial spikes above goal but sugars do come down eventually. We discussed increasing Novolog to 10u AC + correction scale. Keep working on timing - she is timing correctly most of the time.    Not tolerating metformin so she is not taking this now.    We discussed increased insulin resistance as the pregnancy progresses and expected increase in requirement. We also discussed snacks with fat and protein rather than just carbohydrate as this will slow glucose absorption.    Due for A1c -- ordered by OB and she will have labs done possibly today  We reviewed recs for eye exam qTrimester   Need to re-evaluate lipids and uACR postpartum    Delivery planning for 37-38 wks  Pre-pregnancy she was on metformin only. She does plan to breastfeed. May be able to just use metformin postpartum but I suspect she will need at lease some basal insulin as well. Once she is done breastfeeding I would recommend starting Mounjaro or a GLP1 agonists and weaning down/off insulin. Will provide final postpartum DM recs a bit closer to delivery.      Patient Instructions   We will split your long acting insulin (Toujeo) into two doses, one in the morning and one at bedtime.    Toujeo 24 units in the morning and 24 units at bedtime     Increase Novolog to 10 units before each meal + correction scale below  Novolog correction based on before meal glucose and bedtime  "glucose:  130-180: add 1 unit  181-230: add 2 units  231-280: add 3 units  281-330: add 4 units  >330: add 5 units     Try to give Novolog 15-20 min before eating      Have labs done when you have a chance, we are due to check your A1c again.    I am sending refills of Novolog and pen needles.    Follow up on Friday Feb 21 at 12pm for a virtual visit      If  y our dexcom is reading low (less than 65) but you do not feel symptoms, check a fingerstick and confirm you are actually low before treating the low.    "15-15 Rule" for Treatment of Hypoglycemia - Low Blood Sugar -- in pregnancy we keep the blood sugar a bit lower than outside of pregnancy, so we will consider less than 65 to be low, not less than 70.     What can you do?  Rule of 15:   Test your blood sugar  If glucose is between 50-70 mg/dL then ingest 15 grams of fast-acting carbs  If glucose is less than 50 mg/dL then ingest 30 grams of fast-acting carbs  Ingest 15 grams of fast-acting carbohydrate - such as:   3-4 glucose tablets  4 oz juice  ½ can regular soda   15 skittles or mini jelly beans   Re-check your blood sugar in 15 minutes. If its less than 70mg/dl, repeat steps 2 and 3.  If your next meal is more than 1 hour away, eat an additional 15 grams of carbohydrate and 1 ounce of protein (examples include crackers with cheese or one-half of a sandwich with peanut butter). It is important not to eat too much because this can raise your blood sugar above the target level.     When treating a low blood glucose, the choice of carbohydrate source is important. Complex carbohydrates, or foods that contain fats along with carbs (like chocolate) can slow the absorption of glucose and should not be used to treat an emergency low     After your blood sugar has normalized, think about why you went low. If you notice a pattern of low blood sugars, contact your Diabetes Team. We may need to adjust your medication.       Once you start using an insulin pen it is " good out of the fridge for 28 days            virtual f/u on Friday 2/21 at 12pm       Tiki Kwon MD  [facility]    Visit today included increased complexity associated with the care of the problems addressed and managing the longitudinal care of the patient due to the serious and/or complex managed problems

## 2025-02-05 NOTE — ASSESSMENT & PLAN NOTE
Reviewed dexcom G7 data -- fasting BG above goal, usually low 100s -- 100-110; goal fasting glucose <95. Will increase basal insulin dose to 24u BID (splitting into BID dosing since now >40u basal)    Still with similar prandial spikes above goal but sugars do come down eventually. We discussed increasing Novolog to 10u AC + correction scale. Keep working on timing - she is timing correctly most of the time.    Not tolerating metformin so she is not taking this now.    We discussed increased insulin resistance as the pregnancy progresses and expected increase in requirement. We also discussed snacks with fat and protein rather than just carbohydrate as this will slow glucose absorption.    Due for A1c -- ordered by OB and she will have labs done possibly today  We reviewed recs for eye exam qTrimester   Need to re-evaluate lipids and uACR postpartum    Delivery planning for 37-38 wks  Pre-pregnancy she was on metformin only. She does plan to breastfeed. May be able to just use metformin postpartum but I suspect she will need at lease some basal insulin as well. Once she is done breastfeeding I would recommend starting Mounjaro or a GLP1 agonists and weaning down/off insulin. Will provide final postpartum DM recs a bit closer to delivery.      Patient Instructions   We will split your long acting insulin (Toujeo) into two doses, one in the morning and one at bedtime.    Toujeo 24 units in the morning and 24 units at bedtime     Increase Novolog to 10 units before each meal + correction scale below  Novolog correction based on before meal glucose and bedtime glucose:  130-180: add 1 unit  181-230: add 2 units  231-280: add 3 units  281-330: add 4 units  >330: add 5 units     Try to give Novolog 15-20 min before eating      Have labs done when you have a chance, we are due to check your A1c again.    I am sending refills of Novolog and pen needles.    Follow up on Friday Feb 21 at 12pm for a virtual visit      If  y  "our dexcom is reading low (less than 65) but you do not feel symptoms, check a fingerstick and confirm you are actually low before treating the low.    "15-15 Rule" for Treatment of Hypoglycemia - Low Blood Sugar -- in pregnancy we keep the blood sugar a bit lower than outside of pregnancy, so we will consider less than 65 to be low, not less than 70.     What can you do?  Rule of 15:   Test your blood sugar  If glucose is between 50-70 mg/dL then ingest 15 grams of fast-acting carbs  If glucose is less than 50 mg/dL then ingest 30 grams of fast-acting carbs  Ingest 15 grams of fast-acting carbohydrate - such as:   3-4 glucose tablets  4 oz juice  ½ can regular soda   15 skittles or mini jelly beans   Re-check your blood sugar in 15 minutes. If its less than 70mg/dl, repeat steps 2 and 3.  If your next meal is more than 1 hour away, eat an additional 15 grams of carbohydrate and 1 ounce of protein (examples include crackers with cheese or one-half of a sandwich with peanut butter). It is important not to eat too much because this can raise your blood sugar above the target level.     When treating a low blood glucose, the choice of carbohydrate source is important. Complex carbohydrates, or foods that contain fats along with carbs (like chocolate) can slow the absorption of glucose and should not be used to treat an emergency low     After your blood sugar has normalized, think about why you went low. If you notice a pattern of low blood sugars, contact your Diabetes Team. We may need to adjust your medication.       Once you start using an insulin pen it is good out of the fridge for 28 days      "

## 2025-02-05 NOTE — PROGRESS NOTES
Maternal Fetal Medicine follow up consult    SUBJECTIVE:     Jose Hernandez is a 34 y.o.  female with IUP at 32w0d who is seen in follow up consultation by M.  Pregnancy complications include:   Problem   Pre-Existing Type 2 Diabetes Mellitus During Pregnancy in Third Trimester   Low-Lying Placenta   Fetal Arrhythmia Affecting Pregnancy, Antepartum       Previous notes reviewed.   No changes to medical, surgical, family, social, or obstetric history.    Interval history since last MFM visit:   Doing well. Following closely with Endo with recent adjustments     Medications reviewed.    Care team members:  Dr. Martinez - Primary OB  Dr. Kwon - Endocrinology     OBJECTIVE:   LMP 2024 (Exact Date)     Ultrasound performed. See viewpoint for full ultrasound report.      Significant labs/imaging  N/A    ASSESSMENT/PLAN:     34 y.o.  female with IUP at 32w0d    Pre-existing type 2 diabetes mellitus during pregnancy in third trimester  See prior notes for full counseling on pregestational DM in pregnancy. Patient following with Dr. Kwon with Endocrinology with adjustments made today.     Recommendations (Please refer to [facility] guidelines):  Baseline evaluation (to be ordered by primary OB provider):  24 hour urine protein or urine P/C ratio, CBC, CMP - needs P/Cr ratio  Maternal EKG; echocardiogram if BMI > 30 or EKG is abnormal - ordered, not completed  Maternal ophthalmic exam (if hasn't been performed in last year) and podiatry exam  Hemoglobin A1C every trimester - due  Diabetic education referral and counseling re: goal blood sugars (< 95 mg/dl for fasting, < 120 mg/dl for 2 hour postprandial)  Continue to check blood sugars 4x daily  Fasting and 2-hour postprandial glucose monitoring.   Goals of fasting levels below 95 mg/dL and postprandial levels below 120 mg/dL.   Medications:   Low dose aspirin 81 mg daily at 12-16 weeks for preeclampsia risk reduction  1 mg folic acid  daily  Insulin-see below  Toujeo 24 units in the morning and 24 units at bedtime   Novolog 10 units before each meal + correction scale below  Novolog correction based on before meal glucose and bedtime glucose:  130-180: add 1 unit  181-230: add 2 units  231-280: add 3 units  281-330: add 4 units  >330: add 5 units  Ultrasounds:  Serial growth ultrasounds q 4-6 weeks at 26-28 weeks  A fetal echocardiogram is recommended at 22-24 weeks with pediatric cardiology - completed  Prenatal testing  Twice weekly BPP/ NST + AFV starting at 32 weeks. (Should be ordered and arranged by the patient's primary OB provider).    Delivery timin 0/7 to 38 and 6/7 weeks if under good control without comorbidities  37 0/7 to 37 and 6/7 weeks if longstanding diabetes or poorly controlled, polyhydramnios, EFW>90th percentile, or BMI >= 40  36 0/7 to 37 and 6/7 weeks if vascular complications, prior stillbirth or other complicating conditions.  Recommend consideration of earlier delivery if IUGR, HTN, or other complications  Recommend offering  for delivery is EFW is 4500g or more near the time of delivery    Insulin management during labor and delivery  Give usual dose of intermediate acting (NPH) or long-acting insulin at bedtime  Hold morning dose of insulin or reduce to ½ dose   Patients who require insulin should be scheduled as the first available (7 am or 7:30 am)  given the need for NPO status  Check glucose every 2-4 hours in latent labor; hourly in active labor  If blood glucose is less than 70 mg/dl, change IVF to D5 ½ NS at rate of 100-150 cc/hr and monitor blood glucose hourly   IV infusion of regular insulin is recommended if glucose levels exceed 120 mg/dl  Postpartum management  Insulin should be reduced by 1/2 of the pre-delivery dose within the first 6-24 hours following delivery.  Patients who were well-controlled on oral regimens can often return to these following delivery.  Patients who are  breastfeeding should be advised to have small snacks before breastfeeding to reduce the risk of hypoglycemia.  Patients should be referred to primary MD or Endocrinology for postpartum management.      Fetal arrhythmia affecting pregnancy, antepartum  Resolved on most recent ultrasounds (fetal PACs)  Fetal echo completed      Low-lying placenta  Resolved    Follow up in 4 weeks for MFM visit. Patient requests appointments in [place] when possible.     Briana Kirkland MD  PGY 7  Maternal Fetal Medicine  [facility]

## 2025-02-05 NOTE — PATIENT INSTRUCTIONS
"We will split your long acting insulin (Toujeo) into two doses, one in the morning and one at bedtime.    Toujeo 24 units in the morning and 24 units at bedtime     Increase Novolog to 10 units before each meal + correction scale below  Novolog correction based on before meal glucose and bedtime glucose:  130-180: add 1 unit  181-230: add 2 units  231-280: add 3 units  281-330: add 4 units  >330: add 5 units     Try to give Novolog 15-20 min before eating      Have labs done when you have a chance, we are due to check your A1c again.    I am sending refills of Novolog and pen needles.    Follow up on Friday Feb 21 at 12pm for a virtual visit      If  y our dexcom is reading low (less than 65) but you do not feel symptoms, check a fingerstick and confirm you are actually low before treating the low.    "15-15 Rule" for Treatment of Hypoglycemia - Low Blood Sugar -- in pregnancy we keep the blood sugar a bit lower than outside of pregnancy, so we will consider less than 65 to be low, not less than 70.     What can you do?  Rule of 15:   Test your blood sugar  If glucose is between 50-70 mg/dL then ingest 15 grams of fast-acting carbs  If glucose is less than 50 mg/dL then ingest 30 grams of fast-acting carbs  Ingest 15 grams of fast-acting carbohydrate - such as:   3-4 glucose tablets  4 oz juice  ½ can regular soda   15 skittles or mini jelly beans   Re-check your blood sugar in 15 minutes. If its less than 70mg/dl, repeat steps 2 and 3.  If your next meal is more than 1 hour away, eat an additional 15 grams of carbohydrate and 1 ounce of protein (examples include crackers with cheese or one-half of a sandwich with peanut butter). It is important not to eat too much because this can raise your blood sugar above the target level.     When treating a low blood glucose, the choice of carbohydrate source is important. Complex carbohydrates, or foods that contain fats along with carbs (like chocolate) can slow the " absorption of glucose and should not be used to treat an emergency low     After your blood sugar has normalized, think about why you went low. If you notice a pattern of low blood sugars, contact your Diabetes Team. We may need to adjust your medication.       Once you start using an insulin pen it is good out of the fridge for 28 days

## 2025-02-05 NOTE — ASSESSMENT & PLAN NOTE
See prior notes for full counseling on pregestational DM in pregnancy. Patient following with Dr. Kwon with Endocrinology with adjustments made today.     Recommendations (Please refer to [facility] guidelines):  Baseline evaluation (to be ordered by primary OB provider):  24 hour urine protein or urine P/C ratio, CBC, CMP - needs P/Cr ratio  Maternal EKG; echocardiogram if BMI > 30 or EKG is abnormal - ordered, not completed  Maternal ophthalmic exam (if hasn't been performed in last year) and podiatry exam  Hemoglobin A1C every trimester - due  Diabetic education referral and counseling re: goal blood sugars (< 95 mg/dl for fasting, < 120 mg/dl for 2 hour postprandial)  Continue to check blood sugars 4x daily  Fasting and 2-hour postprandial glucose monitoring.   Goals of fasting levels below 95 mg/dL and postprandial levels below 120 mg/dL.   Medications:   Low dose aspirin 81 mg daily at 12-16 weeks for preeclampsia risk reduction  1 mg folic acid daily  Insulin-see below  Toujeo 24 units in the morning and 24 units at bedtime   Novolog 10 units before each meal + correction scale below  Novolog correction based on before meal glucose and bedtime glucose:  130-180: add 1 unit  181-230: add 2 units  231-280: add 3 units  281-330: add 4 units  >330: add 5 units  Ultrasounds:  Serial growth ultrasounds q 4-6 weeks at 26-28 weeks  A fetal echocardiogram is recommended at 22-24 weeks with pediatric cardiology - completed  Prenatal testing  Twice weekly BPP/ NST + AFV starting at 32 weeks. (Should be ordered and arranged by the patient's primary OB provider).    Delivery timin 0/7 to 38 and 6/7 weeks if under good control without comorbidities  37 0/7 to 37 and 6/7 weeks if longstanding diabetes or poorly controlled, polyhydramnios, EFW>90th percentile, or BMI >= 40  36 0/7 to 37 and 6/7 weeks if vascular complications, prior stillbirth or other complicating conditions.  Recommend consideration of earlier  delivery if IUGR, HTN, or other complications  Recommend offering  for delivery is EFW is 4500g or more near the time of delivery    Insulin management during labor and delivery  Give usual dose of intermediate acting (NPH) or long-acting insulin at bedtime  Hold morning dose of insulin or reduce to ½ dose   Patients who require insulin should be scheduled as the first available (7 am or 7:30 am)  given the need for NPO status  Check glucose every 2-4 hours in latent labor; hourly in active labor  If blood glucose is less than 70 mg/dl, change IVF to D5 ½ NS at rate of 100-150 cc/hr and monitor blood glucose hourly   IV infusion of regular insulin is recommended if glucose levels exceed 120 mg/dl  Postpartum management  Insulin should be reduced by 1/2 of the pre-delivery dose within the first 6-24 hours following delivery.  Patients who were well-controlled on oral regimens can often return to these following delivery.  Patients who are breastfeeding should be advised to have small snacks before breastfeeding to reduce the risk of hypoglycemia.  Patients should be referred to primary MD or Endocrinology for postpartum management.

## 2025-02-10 ENCOUNTER — PATIENT MESSAGE (OUTPATIENT)
Dept: OBSTETRICS AND GYNECOLOGY | Facility: CLINIC | Age: 35
End: 2025-02-10
Payer: MEDICAID

## 2025-02-10 RX ORDER — FERROUS SULFATE 325(65) MG
325 TABLET, DELAYED RELEASE (ENTERIC COATED) ORAL EVERY OTHER DAY
Qty: 60 TABLET | Refills: 3 | Status: SHIPPED | OUTPATIENT
Start: 2025-02-10

## 2025-02-11 ENCOUNTER — PROCEDURE VISIT (OUTPATIENT)
Dept: MATERNAL FETAL MEDICINE | Facility: CLINIC | Age: 35
End: 2025-02-11
Payer: MEDICAID

## 2025-02-11 DIAGNOSIS — N88.3 SHORT CERVIX: ICD-10-CM

## 2025-02-11 PROCEDURE — 76819 FETAL BIOPHYS PROFIL W/O NST: CPT | Mod: PBBFAC,PO | Performed by: STUDENT IN AN ORGANIZED HEALTH CARE EDUCATION/TRAINING PROGRAM

## 2025-02-13 ENCOUNTER — HOSPITAL ENCOUNTER (OUTPATIENT)
Dept: OBSTETRICS AND GYNECOLOGY | Facility: HOSPITAL | Age: 35
Discharge: HOME | End: 2025-02-13
Attending: OBSTETRICS & GYNECOLOGY
Payer: MEDICAID

## 2025-02-13 VITALS
DIASTOLIC BLOOD PRESSURE: 71 MMHG | OXYGEN SATURATION: 99 % | RESPIRATION RATE: 19 BRPM | SYSTOLIC BLOOD PRESSURE: 119 MMHG | HEART RATE: 101 BPM

## 2025-02-13 DIAGNOSIS — E11.9 TYPE 2 DIABETES MELLITUS WITHOUT COMPLICATION, WITHOUT LONG-TERM CURRENT USE OF INSULIN: ICD-10-CM

## 2025-02-13 PROCEDURE — 59025 FETAL NON-STRESS TEST: CPT

## 2025-02-17 ENCOUNTER — PROCEDURE VISIT (OUTPATIENT)
Dept: MATERNAL FETAL MEDICINE | Facility: CLINIC | Age: 35
End: 2025-02-17
Payer: MEDICAID

## 2025-02-17 ENCOUNTER — ROUTINE PRENATAL (OUTPATIENT)
Dept: OBSTETRICS AND GYNECOLOGY | Facility: CLINIC | Age: 35
End: 2025-02-17
Payer: MEDICAID

## 2025-02-17 VITALS
DIASTOLIC BLOOD PRESSURE: 74 MMHG | WEIGHT: 235.31 LBS | SYSTOLIC BLOOD PRESSURE: 109 MMHG | BODY MASS INDEX: 43.04 KG/M2 | HEART RATE: 97 BPM

## 2025-02-17 DIAGNOSIS — N88.3 SHORT CERVIX: ICD-10-CM

## 2025-02-17 DIAGNOSIS — Z3A.33 33 WEEKS GESTATION OF PREGNANCY: Primary | ICD-10-CM

## 2025-02-17 LAB
BILIRUB SERPL-MCNC: ABNORMAL MG/DL
BLOOD URINE, POC: ABNORMAL
CLARITY, POC UA: ABNORMAL
COLOR, POC UA: YELLOW
GLUCOSE UR QL STRIP: ABNORMAL
KETONES UR QL STRIP: ABNORMAL
LEUKOCYTE ESTERASE URINE, POC: ABNORMAL
NITRITE, POC UA: ABNORMAL
PH, POC UA: 7
PROTEIN, POC: >=300
SPECIFIC GRAVITY, POC UA: 1.02
UROBILINOGEN, POC UA: 0.2

## 2025-02-17 PROCEDURE — 99213 OFFICE O/P EST LOW 20 MIN: CPT | Mod: PBBFAC,PO | Performed by: OBSTETRICS & GYNECOLOGY

## 2025-02-17 PROCEDURE — 81002 URINALYSIS NONAUTO W/O SCOPE: CPT | Mod: PBBFAC,PO | Performed by: OBSTETRICS & GYNECOLOGY

## 2025-02-17 PROCEDURE — 76819 FETAL BIOPHYS PROFIL W/O NST: CPT | Mod: PBBFAC,PO | Performed by: OBSTETRICS & GYNECOLOGY

## 2025-02-17 NOTE — PROGRESS NOTES
Having more mouth/face pain,. Seeing a dentist. Having GERD, not taking anything, threw up today because of it     @ 33 5/7 wga  IOB labs done   Consents done  3rd trim labs done  Discussed RSV and tdap vaccines, will think about it    Type 2 DM: Patient reports never formally dx with type 2 dm  1st trimester A1C: 7.1;   2nd trimester: 7.3  3rd trim 6.8  Reviewed range, goes up to 170 about an hour after eating but overall normal.   Fasting still above 95, discussed increasing her insulin, she declines and reports this is good enough for her since better than before. Discussed that prolonged exposure to higher blood sugars for the baby will increase his risk for hypoglycemia, still birth, placenta compromise, fetal growth restriction ect. Discussed it is is great that is better than before but we want it to look as controlled as possible in these last few weeks.   On metformin 1000 BID(stopped due to tolerance) lantus 24 BID and 10 U aspart before meals; stopped the SSI before meals per endocrinology   130-180: add 2 unit  181-230: add 4 units  231-280: add 6 units  281-330: add 8 units  >330: add 10 units  Seeing endo and set up for eye and foot exam    peds echo done /3,  echo and appears normal, recommend work up after delivery if any concern   EKG for mom, has cardiology appt   On ASA   Seeing MFM   Set up 2x/wk testing at 32 weeks and weekly appt with either me or MFM   AMA:   -  AFP negative    - MT21  negative     Hidradenitis suppurativa: was on humara, but stopped during pregnancy     Leep with HPV + pap smear   Colpo done in 1st trimester normal    Normal CL    H/o  x2   Consents signed for repeat ,   Discussed setting up between 37- 37 6/7 wga like MFM recommended and she declines, would like it to be ont he 21st so more in line with her and her son's bdays. Discussed why these recommendations are what they are, that with her medical issues the baby is at high risk for issues (still  birth being the biggest concern). She wants to reach out to MFM to see if they would agree she is ok to wait until , discussed they likely will not agree since they already made there recommendations. Will set up on the  as she aware of the risks of waiting longer.   Smoking: is cutting down and down form 1 pack to 1/3 pack per day. Encouraged to continue  Short cervix  Doing vaginal progesterone   Did CL, normal   Fetal PAC:   Resolved, Has decreased caffeine, understand recommendations for more frequent fetal monitoring however not able to come more frequently since stays out of town most of the time.    delivery: Previous pregnancy with oligo and delivery at 32 weeks (records from 2017 that can see suggest fetal growth restriction with velamentous cord insertion)  Undesired fertility: medicaid consent signed   Low lying placenta: resolved  Pilonidial cyst: has I&D several times and appt set up with general surgery, will continue keflex for now because seems to be helping  GERD: discussed lifestyle modifications and starting pepcid BID      Growth  25: 3lb 15 oz 1776gm; 22%    Face to Face time with patient: 30 minutes of total time spent on the encounter, which includes face to face time and non-face to face time preparing to see the patient (eg, review of tests), Obtaining and/or reviewing separately obtained history, Documenting clinical information in the electronic or other health record, Independently interpreting results (not separately reported) and communicating results to the patient/family/caregiver, or Care coordination (not separately reported).

## 2025-02-18 ENCOUNTER — TELEPHONE (OUTPATIENT)
Dept: OBSTETRICS AND GYNECOLOGY | Facility: CLINIC | Age: 35
End: 2025-02-18
Payer: MEDICAID

## 2025-02-18 NOTE — TELEPHONE ENCOUNTER
Added her  on for . Will likely start around 9335-8145 therese. Can you please block out my schedule for geovanny starting at 1030. We can cancel her appointment that day and any appointments she has after that day and give her a 1 week from 3/17./25 dressing removal appointment.  Can you move the other 3 patients up to before 1030? (The 1045 has a US at 1020 so if she could here around 10 or before then we could see her before her ultrasound)

## 2025-02-20 ENCOUNTER — HOSPITAL ENCOUNTER (OUTPATIENT)
Dept: OBSTETRICS AND GYNECOLOGY | Facility: HOSPITAL | Age: 35
Discharge: HOME | End: 2025-02-20
Attending: OBSTETRICS & GYNECOLOGY
Payer: MEDICAID

## 2025-02-20 ENCOUNTER — HOSPITAL ENCOUNTER (OUTPATIENT)
Facility: HOSPITAL | Age: 35
Discharge: HOME | End: 2025-02-20
Attending: OBSTETRICS & GYNECOLOGY | Admitting: OBSTETRICS & GYNECOLOGY
Payer: MEDICAID

## 2025-02-20 VITALS
SYSTOLIC BLOOD PRESSURE: 124 MMHG | RESPIRATION RATE: 19 BRPM | HEART RATE: 89 BPM | DIASTOLIC BLOOD PRESSURE: 71 MMHG | OXYGEN SATURATION: 99 %

## 2025-02-20 VITALS
BODY MASS INDEX: 43.3 KG/M2 | OXYGEN SATURATION: 100 % | DIASTOLIC BLOOD PRESSURE: 67 MMHG | HEIGHT: 62 IN | WEIGHT: 235.31 LBS | TEMPERATURE: 99 F | HEART RATE: 91 BPM | SYSTOLIC BLOOD PRESSURE: 120 MMHG | RESPIRATION RATE: 20 BRPM

## 2025-02-20 DIAGNOSIS — Z36.89 NON-STRESS TEST REACTIVE ON FETAL SURVEILLANCE: ICD-10-CM

## 2025-02-20 DIAGNOSIS — E11.9 TYPE 2 DIABETES MELLITUS WITHOUT COMPLICATION, WITHOUT LONG-TERM CURRENT USE OF INSULIN: ICD-10-CM

## 2025-02-20 PROCEDURE — 76818 FETAL BIOPHYS PROFILE W/NST: CPT

## 2025-02-20 PROCEDURE — 99211 OFF/OP EST MAY X REQ PHY/QHP: CPT | Mod: 25

## 2025-02-20 PROCEDURE — 59025 FETAL NON-STRESS TEST: CPT

## 2025-02-20 RX ORDER — SODIUM CHLORIDE, SODIUM LACTATE, POTASSIUM CHLORIDE, CALCIUM CHLORIDE 600; 310; 30; 20 MG/100ML; MG/100ML; MG/100ML; MG/100ML
INJECTION, SOLUTION INTRAVENOUS CONTINUOUS
Status: DISCONTINUED | OUTPATIENT
Start: 2025-02-20 | End: 2025-02-20

## 2025-02-20 NOTE — PLAN OF CARE
Admitted  35 y/o g 3 p2 @ 34.1 wga arrived on unit from prenatal testing for a bpp. Vss. Aaaox3. Allergies noted. assesssment done. See doc flowsheet. poc discussed. Verbalized understanding. Safety precautions maintained. efm and toco applied and explained.

## 2025-02-20 NOTE — PROGRESS NOTES
Procedure: NST   Indication: type 2 DM   @ 34 1/7  Baseline: hard to determine, after 40 minutes still fluctuating   Variability: moderate  Accelerations: yes  Declarations:  no    Impression: baby very active, likely reactive NST however unable to determine a baseline so will send to L&D for monitoring and ultrasound

## 2025-02-20 NOTE — PLAN OF CARE
Bpp 8/8 reported to dr alice md rev strip from her office.  Will keep on the monitor for 10 more mins, may dc

## 2025-02-21 ENCOUNTER — OFFICE VISIT (OUTPATIENT)
Dept: ENDOCRINOLOGY | Facility: CLINIC | Age: 35
End: 2025-02-21
Payer: MEDICAID

## 2025-02-21 DIAGNOSIS — O24.113 PRE-EXISTING TYPE 2 DIABETES MELLITUS DURING PREGNANCY IN THIRD TRIMESTER: Primary | ICD-10-CM

## 2025-02-21 NOTE — PROGRESS NOTES
Endocrinology Return Visit   02/21/2025  The patient location is: work  Visit type: audiovisual    Face to Face time with patient: 13 minutes  25 minutes of total time spent on the encounter, which includes face to face time and non-face to face time preparing to see the patient (eg, review of tests), Obtaining and/or reviewing separately obtained history, Documenting clinical information in the electronic or other health record, Independently interpreting results (not separately reported) and communicating results to the patient/family/caregiver, or Care coordination (not separately reported).     Each patient to whom he or she provides medical services by telemedicine is:  (1) informed of the relationship between the physician and patient and the respective role of any other health care provider with respect to management of the patient; and (2) notified that he or she may decline to receive medical services by telemedicine and may withdraw from such care at any time.    Subjective:      Chief Complaint:  Diabetes      History of Present Illness  Jose Hernandez is a 34 y.o. female with tobacco use d/o, T2DM, obesity, hidradenitis suppurativa (was on Humira before pregnancy) who presents for follow up of T2DM in pregnancy.    Pre-pregnancy she was on metformin only for her DM  She is currently pregnant, gestational age 34w2d.  Pregnancy c/b low lying placenta, hx C/S x2, tobacco use, short cervix, and obesity   Delivery planned between 37-38 wks. She does plan to breastfeed.     Repeat c/s  scheduled 3/21/2025    I last saw her 2/5/2025. At that time we increased Toujeo and Novolog.        T2DM  Diagnosed early in current pregnancy with 1st trimester A1c 7.1%; pt reportedly unaware of dx prior to this. However A1c was also 7.1% in 9/2023.  preDM before, she denies DM or GDM in prior pregnancies   Known complications: gastroparesis    Current Diabetes Regimen:  Metformin 1000 mg BID -- not taking this  "anymore due to diarrhea  Toujeo 24u BID  Novolog 10u AC + correction scale below  Novolog correction based on before meal glucose and bedtime glucose:  130-180: add 1 unit  181-230: add 2 units  231-280: add 3 units  281-330: add 4 units  >330: add 5 units    Giving Novolog 15 min before meals most of the time, occ right before she eats   Taking insulin consistently - occ misses one of the metformin doses due to SE - vomiting diarrhea    Prior mediations tried:  none    Diet/Exercise:  Working on diet  Biggest meal in the evening - prev was working overnight but been on day schedule and eating 3 meals/day more consistently at typical meal times.   Breakfast - 8-9am on work days, usually coffee + banana, occ a sausage biscuit  Snacks at work - crackers  Lunch - leftovers from dinner   Dinner - mostly at home home cooked meals     Recent Hgb A1C:  Lab Results   Component Value Date    HGBA1C 6.8 (H) 02/07/2025       Glucose Monitoring: Dexcom G7 report reviewed and uploaded under media.  TIR is higher today than 2wks ago. Spikes above 140 consistently with first meal of the day and sometimes dinner as well. Fasting BG still slightly above goal many days. Infrequent, mild hypoglycemia in the 60s.      Hypoglycemic Episodes: One episode where dexcom said low o/n but she was asymptomatic - possibly compression low.  She knows how to correct lows with 15g of sugar    Screening / DM Complications:    Nephropathy:  ACEi/ARB: Not taking  No results found for: "MICALBCREAT"    Last Lipid Panel:  Statin: Not taking; she states elevated cholesterol pre-pregnancy. Will need to re-evaluate lipids postpartum.  No results found for: "LDLCALC"    Last foot exam Most Recent Foot Exam Date: Not Found  Last eye exam Most Recent Eye Exam Date: Not Found;  annually in the spring for contacts and glasses, no laser surgery or DR; saw them within 1yr    B12:  No results found for: "VCMTRDBP80"      ROS:   As above    Objective:     LMP " "06/26/2024 (Exact Date)   BP Readings from Last 3 Encounters:   02/20/25 120/67   02/20/25 124/71   02/17/25 109/74     Wt Readings from Last 1 Encounters:   02/20/25 1300 106.8 kg (235 lb 5.5 oz)     There is no height or weight on file to calculate BMI.    Physical Exam  HENT:      Head: Normocephalic.      Mouth/Throat:      Mouth: Mucous membranes are moist.   Pulmonary:      Effort: Pulmonary effort is normal.   Neurological:      Mental Status: She is alert and oriented to person, place, and time.   Psychiatric:         Mood and Affect: Mood normal.         Behavior: Behavior normal.       Lab Review:   Lab Results   Component Value Date    HGBA1C 6.8 (H) 02/07/2025     No results found for: "CHOL", "HDL", "LDLCALC", "TRIG", "CHOLHDL"  Lab Results   Component Value Date     09/06/2023    K 3.9 09/06/2023     09/06/2023    CO2 24 09/06/2023     09/06/2023    BUN 7 09/06/2023    CREATININE 0.8 09/06/2023    CALCIUM 9.4 09/06/2023    PROT 7.3 01/06/2020    ALBUMIN 3.7 01/06/2020    BILITOT 0.3 01/06/2020    ALKPHOS 70 01/06/2020    AST 39 01/06/2020    ALT 51 (H) 01/06/2020    ANIONGAP 11 09/06/2023    ESTGFRAFRICA >60 01/06/2020    EGFRNONAA >60 01/06/2020     No results found for: "[id number]"      All relevant labs and imaging reviewed.    Assessment and Plan   1. Pre-existing type 2 diabetes mellitus during pregnancy in third trimester  Overview:  DM Type II   -Metformin 500 mg bid   -Labs:    08-16-24:  Plts 374k   Cr 0.6   AST/ALT 17/26 08-20-24: hb electrophoresis normal     08-20-24:  Plts 378k  Hb A1c 7.1%    Assessment & Plan:  Reviewed dexcom G7 data -- fasting BG above goal, usually low 100s -- 100-110; goal fasting glucose <95. Will increase basal insulin dose to 26u BID (splitting into BID dosing since now >40u basal)    Still with similar prandial spikes above goal but sugars do come down eventually. We discussed increasing Novolog to 10u AC + correction scale. She is " concerned about lows with higher prandial dosing -- sometimes 60s-70s before the meal, gives 10u - spikes as high as 180s then comes back down quickly. Often giving novolog right when she starts eating. We discussed backing up the novolog to 20 min before the meal.    Not tolerating metformin so she is not taking this now.    We discussed increased insulin resistance as the pregnancy progresses and expected increase in requirement. We also discussed snacks with fat and protein rather than just carbohydrate as this will slow glucose absorption.    A1c last week  Lab Results   Component Value Date    HGBA1C 6.8 (H) 02/07/2025   We reviewed recs for eye exam qTrimester   Need to re-evaluate lipids and uACR postpartum    Delivery planning for 37-38 wks -- scheduled for c/s 3/21  Pre-pregnancy she was on metformin only. She does plan to breastfeed. May be able to just use metformin postpartum. If she needs anything beyond metformin, can use basal insulin only. Due to not tolerating metformin now - when restarting postpartum I recommend switching to the extended release metformin 750 mg BID rather than IR 1000 mg BID. Once she is done breastfeeding I would recommend starting Mounjaro or a GLP1 agonists and weaning down/off insulin. Will provide final postpartum DM recs a bit closer to delivery.      Patient Instructions   Increase Toujeo to 26 units twice daily    try to give Novolog 20 minutes before eating    Novolog 10u AC + correction scale below  Novolog correction based on before meal glucose and bedtime glucose:  130-180: add 1 unit  181-230: add 2 units  231-280: add 3 units  281-330: add 4 units  >330: add 5 units    Follow up with me next week on Wednesday 2/26 at 4pm for a virtual visit          Tiki Kwon MD  [facility]    Visit today included increased complexity associated with the care of the problems addressed and managing the longitudinal care of the patient due to the serious and/or  complex managed problems

## 2025-02-21 NOTE — ASSESSMENT & PLAN NOTE
Reviewed dexcom G7 data -- fasting BG above goal, usually low 100s -- 100-110; goal fasting glucose <95. Will increase basal insulin dose to 26u BID (splitting into BID dosing since now >40u basal)    Still with similar prandial spikes above goal but sugars do come down eventually. We discussed increasing Novolog to 10u AC + correction scale. She is concerned about lows with higher prandial dosing -- sometimes 60s-70s before the meal, gives 10u - spikes as high as 180s then comes back down quickly. Often giving novolog right when she starts eating. We discussed backing up the novolog to 20 min before the meal.    Not tolerating metformin so she is not taking this now.    We discussed increased insulin resistance as the pregnancy progresses and expected increase in requirement. We also discussed snacks with fat and protein rather than just carbohydrate as this will slow glucose absorption.    A1c last week  Lab Results   Component Value Date    HGBA1C 6.8 (H) 02/07/2025   We reviewed recs for eye exam qTrimester   Need to re-evaluate lipids and uACR postpartum    Delivery planning for 37-38 wks -- scheduled for c/s 3/21  Pre-pregnancy she was on metformin only. She does plan to breastfeed. May be able to just use metformin postpartum. If she needs anything beyond metformin, can use basal insulin only. Due to not tolerating metformin now - when restarting postpartum I recommend switching to the extended release metformin 750 mg BID rather than IR 1000 mg BID. Once she is done breastfeeding I would recommend starting Mounjaro or a GLP1 agonists and weaning down/off insulin. Will provide final postpartum DM recs a bit closer to delivery.      Patient Instructions   Increase Toujeo to 26 units twice daily    try to give Novolog 20 minutes before eating    Novolog 10u AC + correction scale below  Novolog correction based on before meal glucose and bedtime glucose:  130-180: add 1 unit  181-230: add 2 units  231-280: add 3  units  281-330: add 4 units  >330: add 5 units    Follow up with me next week on Wednesday 2/26 at 4pm for a virtual visit

## 2025-02-21 NOTE — PATIENT INSTRUCTIONS
Increase Toujeo to 26 units twice daily    try to give Novolog 20 minutes before eating    Novolog 10u AC + correction scale below  Novolog correction based on before meal glucose and bedtime glucose:  130-180: add 1 unit  181-230: add 2 units  231-280: add 3 units  281-330: add 4 units  >330: add 5 units    Follow up with me next week on Wednesday 2/26 at 4pm for a virtual visit

## 2025-02-26 ENCOUNTER — PATIENT MESSAGE (OUTPATIENT)
Dept: ENDOCRINOLOGY | Facility: CLINIC | Age: 35
End: 2025-02-26

## 2025-02-26 ENCOUNTER — PATIENT MESSAGE (OUTPATIENT)
Dept: OTHER | Facility: OTHER | Age: 35
End: 2025-02-26
Payer: MEDICAID

## 2025-02-26 ENCOUNTER — OFFICE VISIT (OUTPATIENT)
Dept: ENDOCRINOLOGY | Facility: CLINIC | Age: 35
End: 2025-02-26
Payer: MEDICAID

## 2025-02-26 DIAGNOSIS — E78.2 MIXED HYPERLIPIDEMIA: ICD-10-CM

## 2025-02-26 DIAGNOSIS — O24.113 PRE-EXISTING TYPE 2 DIABETES MELLITUS DURING PREGNANCY IN THIRD TRIMESTER: Primary | ICD-10-CM

## 2025-02-26 NOTE — PROGRESS NOTES
Endocrinology Return Visit   02/26/2025  The patient location is: work  Visit type: audiovisual    Face to Face time with patient: 9 minutes  15 minutes of total time spent on the encounter, which includes face to face time and non-face to face time preparing to see the patient (eg, review of tests), Obtaining and/or reviewing separately obtained history, Documenting clinical information in the electronic or other health record, Independently interpreting results (not separately reported) and communicating results to the patient/family/caregiver, or Care coordination (not separately reported).     Each patient to whom he or she provides medical services by telemedicine is:  (1) informed of the relationship between the physician and patient and the respective role of any other health care provider with respect to management of the patient; and (2) notified that he or she may decline to receive medical services by telemedicine and may withdraw from such care at any time.    Subjective:      Chief Complaint:  Diabetes      History of Present Illness  Jose Hernandez is a 34 y.o. female with tobacco use d/o, T2DM, obesity, hidradenitis suppurativa (was on Humira before pregnancy) who presents for follow up of T2DM in pregnancy.    Pre-pregnancy she was on metformin only for her DM  She is currently pregnant, gestational age 34w2d.  Pregnancy c/b low lying placenta, hx C/S x2, tobacco use, short cervix, and obesity   Delivery planned between 37-38 wks. She does plan to breastfeed.     Repeat c/s  scheduled 3/17/2025    I last saw her 2/21/2025. At that time we increased Toujeo but kept Novolog the same.  It has only been 5 days since last visit, so only several days to assess higher basal dose and overall fasting sugars are better, most <95. Seems highs are primarily prandial, some early AM highs are postprandial -- occ meal late/after midnight.      T2DM  Diagnosed early in current pregnancy with 1st trimester  "A1c 7.1%; pt reportedly unaware of dx prior to this. However A1c was also 7.1% in 9/2023.  preDM before, she denies DM or GDM in prior pregnancies   Known complications: gastroparesis    Current Diabetes Regimen:  Metformin 1000 mg BID -- not taking this anymore due to diarrhea  Toujeo 26u BID  Novolog 10u AC + correction scale below  Novolog correction based on before meal glucose and bedtime glucose:  130-180: add 1 unit  181-230: add 2 units  231-280: add 3 units  281-330: add 4 units  >330: add 5 units    Giving Novolog 15 min before meals most of the time, occ right before she eats   Taking insulin consistently - occ misses one of the metformin doses due to SE - vomiting diarrhea    Prior mediations tried:  none    Diet/Exercise:  Working on diet  Biggest meal in the evening - prev was working overnight but been on day schedule and eating 3 meals/day more consistently at typical meal times.   Breakfast - 8-9am on work days, usually coffee + banana, occ a sausage biscuit  Snacks at work - crackers  Lunch - leftovers from dinner   Dinner - mostly at home home cooked meals     Recent Hgb A1C:  Lab Results   Component Value Date    HGBA1C 6.8 (H) 02/07/2025       Glucose Monitoring: Dexcom G7 report reviewed and uploaded under media.  Last 4-5 days fasting sugar better since Toujeo was increased <1wk ago. Still with postprandial spikes above goal.      Hypoglycemic Episodes: One episode where dexcom said low o/n but she was asymptomatic - possibly compression low.  She knows how to correct lows with 15g of sugar    Screening / DM Complications:    Nephropathy:  ACEi/ARB: Not taking  No results found for: "MICALBCREAT"    Last Lipid Panel:  Statin: Not taking; she states elevated cholesterol pre-pregnancy. Will need to re-evaluate lipids postpartum.  No results found for: "LDLCALC"    Last foot exam Most Recent Foot Exam Date: Not Found  Last eye exam Most Recent Eye Exam Date: Not Found;  annually in the spring for " "contacts and glasses, no laser surgery or DR; saw them within 1yr    B12:  No results found for: "PQKSOSGW15"      ROS:   As above    Objective:     LMP 06/26/2024 (Exact Date)   BP Readings from Last 3 Encounters:   02/20/25 120/67   02/20/25 124/71   02/17/25 109/74     Wt Readings from Last 1 Encounters:   02/20/25 1300 106.8 kg (235 lb 5.5 oz)     There is no height or weight on file to calculate BMI.    Physical Exam  HENT:      Head: Normocephalic.      Mouth/Throat:      Mouth: Mucous membranes are moist.   Pulmonary:      Effort: Pulmonary effort is normal.   Neurological:      Mental Status: She is alert and oriented to person, place, and time.   Psychiatric:         Mood and Affect: Mood normal.         Behavior: Behavior normal.       Lab Review:   Lab Results   Component Value Date    HGBA1C 6.8 (H) 02/07/2025     No results found for: "CHOL", "HDL", "LDLCALC", "TRIG", "CHOLHDL"  Lab Results   Component Value Date     09/06/2023    K 3.9 09/06/2023     09/06/2023    CO2 24 09/06/2023     09/06/2023    BUN 7 09/06/2023    CREATININE 0.8 09/06/2023    CALCIUM 9.4 09/06/2023    PROT 7.3 01/06/2020    ALBUMIN 3.7 01/06/2020    BILITOT 0.3 01/06/2020    ALKPHOS 70 01/06/2020    AST 39 01/06/2020    ALT 51 (H) 01/06/2020    ANIONGAP 11 09/06/2023    ESTGFRAFRICA >60 01/06/2020    EGFRNONAA >60 01/06/2020     No results found for: "[id number]"      All relevant labs and imaging reviewed.    Assessment and Plan   1. Pre-existing type 2 diabetes mellitus during pregnancy in third trimester  Overview:  DM Type II   -Metformin 500 mg bid   -Labs:    08-16-24:  Plts 374k   Cr 0.6   AST/ALT 17/26 08-20-24: hb electrophoresis normal     08-20-24:  Plts 378k  Hb A1c 7.1%    Assessment & Plan:  Reviewed dexcom G7 data -- it has only been 5 days since her last visit at at that time we increased toujeo to 26u BID. Fasting sugars have been at goal for most of the time since then. Prandial spike " still above goal. We discussed continuing Toujeo 26u BID but increasing Novolog to 12u AC, and after several meals, if 1hr postprandial glucose is not <140, she should increase novolog to 14u. Continue same SSI.    Not tolerating metformin so she is not taking this now.    We discussed increased insulin resistance as the pregnancy progresses and expected increase in requirement. We also discussed snacks with fat and protein rather than just carbohydrate as this will slow glucose absorption.    A1c last week  Lab Results   Component Value Date    HGBA1C 6.8 (H) 02/07/2025   We reviewed recs for eye exam qTrimester   Need to re-evaluate lipids and uACR postpartum    Delivery planning for 37-38 wks -- scheduled for c/s 3/17  Pre-pregnancy she was on metformin only. She does plan to breastfeed. May be able to just use metformin postpartum. If she needs anything beyond metformin, can use basal insulin only. Due to not tolerating metformin now - when restarting postpartum I recommend switching to the extended release metformin 500 mg daily, and then if tolerating can be increased to 500 mg BID of the ER formulation, rather than IR 1000 mg BID. Additionally, she may also need basal insulin postpartum however her requirement will be much lower. Based on regimen now, I recommend Tioujeo 20u qhs after delivery with dose titrated based on CGM postpartum. Plan to also keep her on dexcom postpartum, fawn if we will continue at least basal insulin.   Once she is done breastfeeding I would recommend starting Mounjaro or a GLP1 agonists and weaning down/off insulin.     Patient Instructions   Continue Toujeo 26 units twice daily     try to give Novolog 20 minutes before eating     Increase Novolog to 12u before  each meal + correction scale below if your pre-meal suar is 130 or higher. After several meals using 12 units before meals, if your sugar 1 hr after the meal is still above 140, then increase Novolog to 14 units before meals  plus the same sliding scale.  Novolog correction based on before meal glucose and bedtime glucose:  130-180: add 1 unit  181-230: add 2 units  231-280: add 3 units  281-330: add 4 units  >330: add 5 units     Follow up with me next week on Thursday 3/6 at 1:30 pm for a virtual visit            2. Mixed hyperlipidemia  Assessment & Plan:  Will need to address postpartum and after she is done breastfeeding         F/u for a virtual visit next week, Thursday 3/6 at 1:30 pm    Tiki Kwon MD  [facility]    Visit today included increased complexity associated with the care of the problems addressed and managing the longitudinal care of the patient due to the serious and/or complex managed problems

## 2025-02-26 NOTE — PATIENT INSTRUCTIONS
Continue Toujeo 26 units twice daily     try to give Novolog 20 minutes before eating     Increase Novolog to 12u before  each meal + correction scale below if your pre-meal suar is 130 or higher. After several meals using 12 units before meals, if your sugar 1 hr after the meal is still above 140, then increase Novolog to 14 units before meals plus the same sliding scale.  Novolog correction based on before meal glucose and bedtime glucose:  130-180: add 1 unit  181-230: add 2 units  231-280: add 3 units  281-330: add 4 units  >330: add 5 units     Follow up with me next week on Thursday 3/6 at 1:30 pm for a virtual visit

## 2025-02-26 NOTE — Clinical Note
Virtual visit next week Thursday 3/6 at 1:30pm. Not sure why my pump clinic is closed next week but it should not be. This is an error. I emailed daphne diaz it and cc'd  you. Thank you!

## 2025-02-26 NOTE — ASSESSMENT & PLAN NOTE
Reviewed dexcom G7 data -- it has only been 5 days since her last visit at at that time we increased toujeo to 26u BID. Fasting sugars have been at goal for most of the time since then. Prandial spike still above goal. We discussed continuing Toujeo 26u BID but increasing Novolog to 12u AC, and after several meals, if 1hr postprandial glucose is not <140, she should increase novolog to 14u. Continue same SSI.    Not tolerating metformin so she is not taking this now.    We discussed increased insulin resistance as the pregnancy progresses and expected increase in requirement. We also discussed snacks with fat and protein rather than just carbohydrate as this will slow glucose absorption.    A1c last week  Lab Results   Component Value Date    HGBA1C 6.8 (H) 02/07/2025   We reviewed recs for eye exam qTrimester   Need to re-evaluate lipids and uACR postpartum    Delivery planning for 37-38 wks -- scheduled for c/s 3/17  Pre-pregnancy she was on metformin only. She does plan to breastfeed. May be able to just use metformin postpartum. If she needs anything beyond metformin, can use basal insulin only. Due to not tolerating metformin now - when restarting postpartum I recommend switching to the extended release metformin 500 mg daily, and then if tolerating can be increased to 500 mg BID of the ER formulation, rather than IR 1000 mg BID. Additionally, she may also need basal insulin postpartum however her requirement will be much lower. Based on regimen now, I recommend Tioujeo 20u qhs after delivery with dose titrated based on CGM postpartum. Plan to also keep her on dexcom postpartum, fawn if we will continue at least basal insulin.   Once she is done breastfeeding I would recommend starting Mounjaro or a GLP1 agonists and weaning down/off insulin.     Patient Instructions   Continue Toujeo 26 units twice daily     try to give Novolog 20 minutes before eating     Increase Novolog to 12u before  each meal + correction  scale below if your pre-meal suar is 130 or higher. After several meals using 12 units before meals, if your sugar 1 hr after the meal is still above 140, then increase Novolog to 14 units before meals plus the same sliding scale.  Novolog correction based on before meal glucose and bedtime glucose:  130-180: add 1 unit  181-230: add 2 units  231-280: add 3 units  281-330: add 4 units  >330: add 5 units     Follow up with me next week on Thursday 3/6 at 1:30 pm for a virtual visit

## 2025-02-27 ENCOUNTER — ROUTINE PRENATAL (OUTPATIENT)
Dept: OBSTETRICS AND GYNECOLOGY | Facility: CLINIC | Age: 35
End: 2025-02-27
Payer: MEDICAID

## 2025-02-27 ENCOUNTER — PROCEDURE VISIT (OUTPATIENT)
Dept: MATERNAL FETAL MEDICINE | Facility: CLINIC | Age: 35
End: 2025-02-27
Payer: MEDICAID

## 2025-02-27 VITALS
SYSTOLIC BLOOD PRESSURE: 114 MMHG | HEART RATE: 94 BPM | DIASTOLIC BLOOD PRESSURE: 78 MMHG | BODY MASS INDEX: 43.4 KG/M2 | WEIGHT: 237.31 LBS

## 2025-02-27 DIAGNOSIS — E11.9 TYPE 2 DIABETES MELLITUS WITHOUT COMPLICATION, WITHOUT LONG-TERM CURRENT USE OF INSULIN: ICD-10-CM

## 2025-02-27 DIAGNOSIS — O24.313 PRE-EXISTING DIABETES MELLITUS DURING PREGNANCY IN THIRD TRIMESTER: ICD-10-CM

## 2025-02-27 DIAGNOSIS — Z3A.35 35 WEEKS GESTATION OF PREGNANCY: Primary | ICD-10-CM

## 2025-02-27 DIAGNOSIS — N88.3 SHORT CERVIX: ICD-10-CM

## 2025-02-27 PROCEDURE — 87653 STREP B DNA AMP PROBE: CPT | Performed by: OBSTETRICS & GYNECOLOGY

## 2025-02-27 PROCEDURE — 99213 OFFICE O/P EST LOW 20 MIN: CPT | Mod: PBBFAC,TH,PO | Performed by: OBSTETRICS & GYNECOLOGY

## 2025-02-27 PROCEDURE — 99999 PR PBB SHADOW E&M-EST. PATIENT-LVL III: CPT | Mod: PBBFAC,,, | Performed by: OBSTETRICS & GYNECOLOGY

## 2025-02-27 PROCEDURE — 76819 FETAL BIOPHYS PROFIL W/O NST: CPT | Mod: PBBFAC,PO | Performed by: OBSTETRICS & GYNECOLOGY

## 2025-02-27 NOTE — PROGRESS NOTES
Having more mouth/face pain,. Seeing a dentist. Having GERD, not taking anything, threw up today because of it   GBS done today   Cervix closed   @ 35 1/7 wga  IOB labs done   Consents done  3rd trim labs done  Discussed RSV and tdap vaccines, will think about it    Type 2 DM: Patient reports never formally dx with type 2 dm  1st trimester A1C: 7.1;   2nd trimester: 7.3  3rd trim 6.8  Reviewed range, endocrinology just update medications  Fasting still above 95, discussed increasing her insulin, she declines and reports this is good enough for her since better than before. Discussed that prolonged exposure to higher blood sugars for the baby will increase his risk for hypoglycemia, still birth, placenta compromise, fetal growth restriction ect. Discussed it is is great that is better than before but we want it to look as controlled as possible in these last few weeks.   On metformin 1000 BID(stopped due to tolerance) lantus 26 BID and 20 U aspart before meals; stopped the SSI before meals per endocrinology   130-180: add 1 unit  181-230: add 2 units  231-280: add 3 units  281-330: add 4 units  >330: add 5  units  Seeing endo and set up for eye and foot exam    peds echo done 12/3,  echo and appears normal, recommend work up after delivery if any concern   EKG for mom, has cardiology appt   On ASA   Seeing MFM   Set up 2x/wk testing at 32 weeks and weekly appt with either me or MFM   AMA:   -  AFP negative    - MT21  negative     Hidradenitis suppurativa: was on humara, but stopped during pregnancy     Leep with HPV + pap smear   Colpo done in 1st trimester normal    Normal CL    H/o  x2   Consents signed for repeat ,   Discussed setting up between 37- 37 6/7 wga like MFM set up 3/17  Smoking: is cutting down and down form 1 pack to 1/3 pack per day. Encouraged to continue  Short cervix  Doing vaginal progesterone   Did CL, normal   Fetal PAC:   Resolved, Has decreased caffeine, understand  recommendations for more frequent fetal monitoring however not able to come more frequently since stays out of town most of the time.    delivery: Previous pregnancy with oligo and delivery at 32 weeks (records from 2017 that can see suggest fetal growth restriction with velamentous cord insertion)  Undesired fertility: medicaid consent signed   Low lying placenta: resolved  Pilonidial cyst: has I&D several times and appt set up with general surgery, will continue keflex for now because seems to be helping  GERD: discussed lifestyle modifications and starting pepcid BID      Growth  25: 3lb 15 oz 1776gm; 22%    Face to Face time with patient: 15 minutes of total time spent on the encounter, which includes face to face time and non-face to face time preparing to see the patient (eg, review of tests), Obtaining and/or reviewing separately obtained history, Documenting clinical information in the electronic or other health record, Independently interpreting results (not separately reported) and communicating results to the patient/family/caregiver, or Care coordination (not separately reported).

## 2025-03-01 LAB — GROUP B STREPTOCOCCUS, PCR: NEGATIVE

## 2025-03-03 ENCOUNTER — HOSPITAL ENCOUNTER (OUTPATIENT)
Dept: OBSTETRICS AND GYNECOLOGY | Facility: HOSPITAL | Age: 35
Discharge: HOME | End: 2025-03-03
Attending: OBSTETRICS & GYNECOLOGY
Payer: MEDICAID

## 2025-03-03 VITALS
DIASTOLIC BLOOD PRESSURE: 62 MMHG | RESPIRATION RATE: 20 BRPM | OXYGEN SATURATION: 99 % | HEART RATE: 108 BPM | SYSTOLIC BLOOD PRESSURE: 116 MMHG

## 2025-03-03 DIAGNOSIS — E11.9 TYPE 2 DIABETES MELLITUS WITHOUT COMPLICATION, WITHOUT LONG-TERM CURRENT USE OF INSULIN: ICD-10-CM

## 2025-03-03 PROCEDURE — 59025 FETAL NON-STRESS TEST: CPT

## 2025-03-05 ENCOUNTER — PROCEDURE VISIT (OUTPATIENT)
Dept: MATERNAL FETAL MEDICINE | Facility: CLINIC | Age: 35
End: 2025-03-05
Payer: MEDICAID

## 2025-03-05 DIAGNOSIS — N88.3 SHORT CERVIX: ICD-10-CM

## 2025-03-05 PROCEDURE — 76819 FETAL BIOPHYS PROFIL W/O NST: CPT | Mod: PBBFAC,PO | Performed by: OBSTETRICS & GYNECOLOGY

## 2025-03-06 ENCOUNTER — PATIENT MESSAGE (OUTPATIENT)
Dept: OBSTETRICS AND GYNECOLOGY | Facility: CLINIC | Age: 35
End: 2025-03-06
Payer: MEDICAID

## 2025-03-09 ENCOUNTER — ANESTHESIA (OUTPATIENT)
Dept: OBSTETRICS AND GYNECOLOGY | Facility: HOSPITAL | Age: 35
End: 2025-03-09
Payer: MEDICAID

## 2025-03-09 ENCOUNTER — HOSPITAL ENCOUNTER (INPATIENT)
Facility: HOSPITAL | Age: 35
LOS: 2 days | Discharge: HOME | End: 2025-03-11
Attending: OBSTETRICS & GYNECOLOGY | Admitting: OBSTETRICS & GYNECOLOGY
Payer: MEDICAID

## 2025-03-09 ENCOUNTER — ANESTHESIA EVENT (OUTPATIENT)
Dept: OBSTETRICS AND GYNECOLOGY | Facility: HOSPITAL | Age: 35
End: 2025-03-09
Payer: MEDICAID

## 2025-03-09 VITALS — HEART RATE: 84 BPM | DIASTOLIC BLOOD PRESSURE: 62 MMHG | OXYGEN SATURATION: 98 % | SYSTOLIC BLOOD PRESSURE: 131 MMHG

## 2025-03-09 DIAGNOSIS — E11.9 TYPE 2 DIABETES MELLITUS WITHOUT COMPLICATION, WITHOUT LONG-TERM CURRENT USE OF INSULIN: ICD-10-CM

## 2025-03-09 DIAGNOSIS — Z98.891 HISTORY OF C-SECTION: Primary | ICD-10-CM

## 2025-03-09 DIAGNOSIS — O42.90 PREMATURE RUPTURE OF MEMBRANES: ICD-10-CM

## 2025-03-09 DIAGNOSIS — Z98.891 S/P CESAREAN SECTION: ICD-10-CM

## 2025-03-09 PROBLEM — F17.200 TOBACCO DEPENDENCY: Status: ACTIVE | Noted: 2025-03-09

## 2025-03-09 PROBLEM — O09.523 AMA (ADVANCED MATERNAL AGE) MULTIGRAVIDA 35+, THIRD TRIMESTER: Status: ACTIVE | Noted: 2024-11-19

## 2025-03-09 PROBLEM — Z30.2 REQUEST FOR STERILIZATION: Status: ACTIVE | Noted: 2025-03-09

## 2025-03-09 LAB
ABO + RH BLD: NORMAL
ALBUMIN SERPL BCP-MCNC: 2.1 G/DL (ref 3.5–5.2)
ALP SERPL-CCNC: 133 U/L (ref 40–150)
ALT SERPL W/O P-5'-P-CCNC: 5 U/L (ref 10–44)
ANION GAP SERPL CALC-SCNC: 9 MMOL/L (ref 8–16)
AST SERPL-CCNC: 10 U/L (ref 10–40)
BASOPHILS # BLD AUTO: 0.03 K/UL (ref 0–0.2)
BASOPHILS NFR BLD: 0.3 % (ref 0–1.9)
BILIRUB SERPL-MCNC: 0.2 MG/DL (ref 0.1–1)
BLD GP AB SCN CELLS X3 SERPL QL: NORMAL
BUN SERPL-MCNC: 8 MG/DL (ref 6–20)
CALCIUM SERPL-MCNC: 9 MG/DL (ref 8.7–10.5)
CHLORIDE SERPL-SCNC: 109 MMOL/L (ref 95–110)
CO2 SERPL-SCNC: 17 MMOL/L (ref 23–29)
CREAT SERPL-MCNC: 0.5 MG/DL (ref 0.5–1.4)
DIFFERENTIAL METHOD BLD: ABNORMAL
EOSINOPHIL # BLD AUTO: 0.1 K/UL (ref 0–0.5)
EOSINOPHIL NFR BLD: 1.3 % (ref 0–8)
ERYTHROCYTE [DISTWIDTH] IN BLOOD BY AUTOMATED COUNT: 15.8 % (ref 11.5–14.5)
EST. GFR  (NO RACE VARIABLE): >60 ML/MIN/1.73 M^2
GLUCOSE SERPL-MCNC: 133 MG/DL (ref 70–110)
HCT VFR BLD AUTO: 33.7 % (ref 37–48.5)
HGB BLD-MCNC: 10.9 G/DL (ref 12–16)
HIV 1+2 AB+HIV1 P24 AG SERPL QL IA: NORMAL
IMM GRANULOCYTES # BLD AUTO: 0.18 K/UL (ref 0–0.04)
IMM GRANULOCYTES NFR BLD AUTO: 1.8 % (ref 0–0.5)
LYMPHOCYTES # BLD AUTO: 2 K/UL (ref 1–4.8)
LYMPHOCYTES NFR BLD: 19.9 % (ref 18–48)
MCH RBC QN AUTO: 24.4 PG (ref 27–31)
MCHC RBC AUTO-ENTMCNC: 32.3 G/DL (ref 32–36)
MCV RBC AUTO: 76 FL (ref 82–98)
MONOCYTES # BLD AUTO: 0.6 K/UL (ref 0.3–1)
MONOCYTES NFR BLD: 6.3 % (ref 4–15)
NEUTROPHILS # BLD AUTO: 7.1 K/UL (ref 1.8–7.7)
NEUTROPHILS NFR BLD: 70.4 % (ref 38–73)
NRBC BLD-RTO: 0 /100 WBC
PLATELET # BLD AUTO: 268 K/UL (ref 150–450)
PMV BLD AUTO: 10.8 FL (ref 9.2–12.9)
POTASSIUM SERPL-SCNC: 3.9 MMOL/L (ref 3.5–5.1)
PROT SERPL-MCNC: 6.9 G/DL (ref 6–8.4)
RBC # BLD AUTO: 4.46 M/UL (ref 4–5.4)
RUPTURE OF MEMBRANE: POSITIVE
SODIUM SERPL-SCNC: 135 MMOL/L (ref 136–145)
TREPONEMA PALLIDUM IGG+IGM AB [PRESENCE] IN SERUM OR PLASMA BY IMMUNOASSAY: NONREACTIVE
WBC # BLD AUTO: 10.14 K/UL (ref 3.9–12.7)

## 2025-03-09 PROCEDURE — 59514 CESAREAN DELIVERY ONLY: CPT | Mod: GB,,, | Performed by: OBSTETRICS & GYNECOLOGY

## 2025-03-09 PROCEDURE — 88302 TISSUE EXAM BY PATHOLOGIST: CPT | Mod: 26,,, | Performed by: PATHOLOGY

## 2025-03-09 PROCEDURE — 85025 COMPLETE CBC W/AUTO DIFF WBC: CPT | Performed by: OBSTETRICS & GYNECOLOGY

## 2025-03-09 PROCEDURE — 58611 LIGATE OVIDUCT(S) ADD-ON: CPT | Mod: ,,, | Performed by: OBSTETRICS & GYNECOLOGY

## 2025-03-09 PROCEDURE — 80053 COMPREHEN METABOLIC PANEL: CPT | Performed by: OBSTETRICS & GYNECOLOGY

## 2025-03-09 PROCEDURE — 51702 INSERT TEMP BLADDER CATH: CPT

## 2025-03-09 PROCEDURE — 25000003 PHARM REV CODE 250: Performed by: STUDENT IN AN ORGANIZED HEALTH CARE EDUCATION/TRAINING PROGRAM

## 2025-03-09 PROCEDURE — 72100002 HC LABOR CARE, 1ST 8 HOURS

## 2025-03-09 PROCEDURE — 0UB70ZZ EXCISION OF BILATERAL FALLOPIAN TUBES, OPEN APPROACH: ICD-10-PCS | Performed by: OBSTETRICS & GYNECOLOGY

## 2025-03-09 PROCEDURE — 37000009 HC ANESTHESIA EA ADD 15 MINS: Performed by: OBSTETRICS & GYNECOLOGY

## 2025-03-09 PROCEDURE — 63600175 PHARM REV CODE 636 W HCPCS: Performed by: OBSTETRICS & GYNECOLOGY

## 2025-03-09 PROCEDURE — 63600175 PHARM REV CODE 636 W HCPCS: Mod: JZ,TB | Performed by: STUDENT IN AN ORGANIZED HEALTH CARE EDUCATION/TRAINING PROGRAM

## 2025-03-09 PROCEDURE — 63600175 PHARM REV CODE 636 W HCPCS: Performed by: NURSE ANESTHETIST, CERTIFIED REGISTERED

## 2025-03-09 PROCEDURE — 86593 SYPHILIS TEST NON-TREP QUANT: CPT | Performed by: OBSTETRICS & GYNECOLOGY

## 2025-03-09 PROCEDURE — 36004724 HC OB OR TIME LEV III - 1ST 15 MIN: Performed by: OBSTETRICS & GYNECOLOGY

## 2025-03-09 PROCEDURE — 11000001 HC ACUTE MED/SURG PRIVATE ROOM

## 2025-03-09 PROCEDURE — 25000003 PHARM REV CODE 250: Performed by: OBSTETRICS & GYNECOLOGY

## 2025-03-09 PROCEDURE — 99211 OFF/OP EST MAY X REQ PHY/QHP: CPT

## 2025-03-09 PROCEDURE — 84112 EVAL AMNIOTIC FLUID PROTEIN: CPT | Performed by: OBSTETRICS & GYNECOLOGY

## 2025-03-09 PROCEDURE — 63600175 PHARM REV CODE 636 W HCPCS: Performed by: STUDENT IN AN ORGANIZED HEALTH CARE EDUCATION/TRAINING PROGRAM

## 2025-03-09 PROCEDURE — 71000033 HC RECOVERY, INTIAL HOUR: Performed by: OBSTETRICS & GYNECOLOGY

## 2025-03-09 PROCEDURE — 36004725 HC OB OR TIME LEV III - EA ADD 15 MIN: Performed by: OBSTETRICS & GYNECOLOGY

## 2025-03-09 PROCEDURE — 37000008 HC ANESTHESIA 1ST 15 MINUTES: Performed by: OBSTETRICS & GYNECOLOGY

## 2025-03-09 PROCEDURE — 71000039 HC RECOVERY, EACH ADD'L HOUR: Performed by: OBSTETRICS & GYNECOLOGY

## 2025-03-09 PROCEDURE — 86850 RBC ANTIBODY SCREEN: CPT | Performed by: OBSTETRICS & GYNECOLOGY

## 2025-03-09 PROCEDURE — 88302 TISSUE EXAM BY PATHOLOGIST: CPT | Performed by: PATHOLOGY

## 2025-03-09 PROCEDURE — 63600175 PHARM REV CODE 636 W HCPCS

## 2025-03-09 PROCEDURE — 87389 HIV-1 AG W/HIV-1&-2 AB AG IA: CPT | Performed by: OBSTETRICS & GYNECOLOGY

## 2025-03-09 RX ORDER — FAMOTIDINE 10 MG/ML
20 INJECTION, SOLUTION INTRAVENOUS
Status: DISCONTINUED | OUTPATIENT
Start: 2025-03-09 | End: 2025-03-11 | Stop reason: HOSPADM

## 2025-03-09 RX ORDER — KETOROLAC TROMETHAMINE 30 MG/ML
30 INJECTION, SOLUTION INTRAMUSCULAR; INTRAVENOUS EVERY 6 HOURS
Status: COMPLETED | OUTPATIENT
Start: 2025-03-09 | End: 2025-03-10

## 2025-03-09 RX ORDER — OXYTOCIN-SODIUM CHLORIDE 0.9% IV SOLN 30 UNIT/500ML 30-0.9/5 UT/ML-%
20 SOLUTION INTRAVENOUS ONCE AS NEEDED
Status: DISCONTINUED | OUTPATIENT
Start: 2025-03-09 | End: 2025-03-11 | Stop reason: HOSPADM

## 2025-03-09 RX ORDER — NALBUPHINE HYDROCHLORIDE 10 MG/ML
2.5 INJECTION INTRAMUSCULAR; INTRAVENOUS; SUBCUTANEOUS ONCE
Status: DISCONTINUED | OUTPATIENT
Start: 2025-03-09 | End: 2025-03-11 | Stop reason: HOSPADM

## 2025-03-09 RX ORDER — MISOPROSTOL 200 UG/1
800 TABLET ORAL ONCE AS NEEDED
Status: DISCONTINUED | OUTPATIENT
Start: 2025-03-09 | End: 2025-03-11 | Stop reason: HOSPADM

## 2025-03-09 RX ORDER — SODIUM CITRATE AND CITRIC ACID MONOHYDRATE 334; 500 MG/5ML; MG/5ML
30 SOLUTION ORAL
Status: DISCONTINUED | OUTPATIENT
Start: 2025-03-09 | End: 2025-03-11 | Stop reason: HOSPADM

## 2025-03-09 RX ORDER — SODIUM CHLORIDE 0.9 % (FLUSH) 0.9 %
10 SYRINGE (ML) INJECTION
Status: DISCONTINUED | OUTPATIENT
Start: 2025-03-09 | End: 2025-03-11 | Stop reason: HOSPADM

## 2025-03-09 RX ORDER — OXYTOCIN-SODIUM CHLORIDE 0.9% IV SOLN 30 UNIT/500ML 30-0.9/5 UT/ML-%
95 SOLUTION INTRAVENOUS ONCE AS NEEDED
Status: DISCONTINUED | OUTPATIENT
Start: 2025-03-09 | End: 2025-03-11 | Stop reason: HOSPADM

## 2025-03-09 RX ORDER — LIDOCAINE HYDROCHLORIDE 10 MG/ML
INJECTION, SOLUTION EPIDURAL; INFILTRATION; INTRACAUDAL; PERINEURAL
Status: COMPLETED | OUTPATIENT
Start: 2025-03-09 | End: 2025-03-09

## 2025-03-09 RX ORDER — ADHESIVE BANDAGE
30 BANDAGE TOPICAL 2 TIMES DAILY PRN
Status: DISCONTINUED | OUTPATIENT
Start: 2025-03-10 | End: 2025-03-11 | Stop reason: HOSPADM

## 2025-03-09 RX ORDER — TRANEXAMIC ACID 100 MG/ML
1000 INJECTION, SOLUTION INTRAVENOUS
Status: DISCONTINUED | OUTPATIENT
Start: 2025-03-09 | End: 2025-03-11 | Stop reason: HOSPADM

## 2025-03-09 RX ORDER — AMOXICILLIN 250 MG
1 CAPSULE ORAL NIGHTLY PRN
Status: DISCONTINUED | OUTPATIENT
Start: 2025-03-09 | End: 2025-03-11 | Stop reason: HOSPADM

## 2025-03-09 RX ORDER — MUPIROCIN 20 MG/G
OINTMENT TOPICAL 2 TIMES DAILY
Status: DISCONTINUED | OUTPATIENT
Start: 2025-03-09 | End: 2025-03-11 | Stop reason: HOSPADM

## 2025-03-09 RX ORDER — KETOROLAC TROMETHAMINE 30 MG/ML
30 INJECTION, SOLUTION INTRAMUSCULAR; INTRAVENOUS EVERY 8 HOURS
Status: DISCONTINUED | OUTPATIENT
Start: 2025-03-09 | End: 2025-03-09

## 2025-03-09 RX ORDER — PHENYLEPHRINE HYDROCHLORIDE 10 MG/ML
INJECTION INTRAVENOUS
Status: DISCONTINUED | OUTPATIENT
Start: 2025-03-09 | End: 2025-03-09

## 2025-03-09 RX ORDER — METHYLERGONOVINE MALEATE 0.2 MG/ML
200 INJECTION INTRAVENOUS
Status: DISCONTINUED | OUTPATIENT
Start: 2025-03-09 | End: 2025-03-11 | Stop reason: HOSPADM

## 2025-03-09 RX ORDER — METFORMIN HYDROCHLORIDE 500 MG/1
500 TABLET ORAL 2 TIMES DAILY WITH MEALS
Status: DISCONTINUED | OUTPATIENT
Start: 2025-03-09 | End: 2025-03-11 | Stop reason: HOSPADM

## 2025-03-09 RX ORDER — IBUPROFEN 400 MG/1
800 TABLET ORAL EVERY 8 HOURS
Status: DISCONTINUED | OUTPATIENT
Start: 2025-03-10 | End: 2025-03-09

## 2025-03-09 RX ORDER — CARBOPROST TROMETHAMINE 250 UG/ML
250 INJECTION, SOLUTION INTRAMUSCULAR
Status: DISCONTINUED | OUTPATIENT
Start: 2025-03-09 | End: 2025-03-11 | Stop reason: HOSPADM

## 2025-03-09 RX ORDER — ACETAMINOPHEN 10 MG/ML
INJECTION, SOLUTION INTRAVENOUS
Status: DISCONTINUED | OUTPATIENT
Start: 2025-03-09 | End: 2025-03-09

## 2025-03-09 RX ORDER — OXYTOCIN-SODIUM CHLORIDE 0.9% IV SOLN 30 UNIT/500ML 30-0.9/5 UT/ML-%
10 SOLUTION INTRAVENOUS ONCE AS NEEDED
Status: CANCELLED | OUTPATIENT
Start: 2025-03-09 | End: 2036-08-04

## 2025-03-09 RX ORDER — ACETAMINOPHEN 325 MG/1
650 TABLET ORAL EVERY 6 HOURS
Status: COMPLETED | OUTPATIENT
Start: 2025-03-09 | End: 2025-03-10

## 2025-03-09 RX ORDER — CEFAZOLIN 2 G/1
2 INJECTION, POWDER, FOR SOLUTION INTRAMUSCULAR; INTRAVENOUS ONCE AS NEEDED
Status: COMPLETED | OUTPATIENT
Start: 2025-03-09 | End: 2025-03-09

## 2025-03-09 RX ORDER — IBUPROFEN 200 MG
1 TABLET ORAL DAILY
Status: DISCONTINUED | OUTPATIENT
Start: 2025-03-09 | End: 2025-03-11 | Stop reason: HOSPADM

## 2025-03-09 RX ORDER — MORPHINE SULFATE 0.5 MG/ML
INJECTION, SOLUTION EPIDURAL; INTRATHECAL; INTRAVENOUS
Status: DISCONTINUED | OUTPATIENT
Start: 2025-03-09 | End: 2025-03-09

## 2025-03-09 RX ORDER — PROCHLORPERAZINE EDISYLATE 5 MG/ML
5 INJECTION INTRAMUSCULAR; INTRAVENOUS EVERY 6 HOURS PRN
Status: DISCONTINUED | OUTPATIENT
Start: 2025-03-09 | End: 2025-03-11 | Stop reason: HOSPADM

## 2025-03-09 RX ORDER — OXYCODONE AND ACETAMINOPHEN 5; 325 MG/1; MG/1
1 TABLET ORAL EVERY 4 HOURS PRN
Status: DISCONTINUED | OUTPATIENT
Start: 2025-03-09 | End: 2025-03-09

## 2025-03-09 RX ORDER — ONDANSETRON HYDROCHLORIDE 2 MG/ML
4 INJECTION, SOLUTION INTRAVENOUS EVERY 6 HOURS PRN
Status: DISPENSED | OUTPATIENT
Start: 2025-03-09 | End: 2025-03-10

## 2025-03-09 RX ORDER — OXYTOCIN 10 [USP'U]/ML
10 INJECTION, SOLUTION INTRAMUSCULAR; INTRAVENOUS ONCE AS NEEDED
Status: DISCONTINUED | OUTPATIENT
Start: 2025-03-09 | End: 2025-03-11 | Stop reason: HOSPADM

## 2025-03-09 RX ORDER — FENTANYL CITRATE 50 UG/ML
INJECTION, SOLUTION INTRAMUSCULAR; INTRAVENOUS
Status: DISCONTINUED | OUTPATIENT
Start: 2025-03-09 | End: 2025-03-09

## 2025-03-09 RX ORDER — OXYTOCIN-SODIUM CHLORIDE 0.9% IV SOLN 30 UNIT/500ML 30-0.9/5 UT/ML-%
95 SOLUTION INTRAVENOUS CONTINUOUS PRN
Status: DISCONTINUED | OUTPATIENT
Start: 2025-03-09 | End: 2025-03-11 | Stop reason: HOSPADM

## 2025-03-09 RX ORDER — DOCUSATE SODIUM 100 MG/1
200 CAPSULE, LIQUID FILLED ORAL 2 TIMES DAILY
Status: DISCONTINUED | OUTPATIENT
Start: 2025-03-09 | End: 2025-03-11 | Stop reason: HOSPADM

## 2025-03-09 RX ORDER — DIPHENOXYLATE HYDROCHLORIDE AND ATROPINE SULFATE 2.5; .025 MG/1; MG/1
2 TABLET ORAL EVERY 6 HOURS PRN
Status: DISCONTINUED | OUTPATIENT
Start: 2025-03-09 | End: 2025-03-11 | Stop reason: HOSPADM

## 2025-03-09 RX ORDER — SIMETHICONE 80 MG
1 TABLET,CHEWABLE ORAL EVERY 6 HOURS PRN
Status: DISCONTINUED | OUTPATIENT
Start: 2025-03-09 | End: 2025-03-11 | Stop reason: HOSPADM

## 2025-03-09 RX ORDER — OXYCODONE HYDROCHLORIDE 10 MG/1
10 TABLET ORAL EVERY 4 HOURS PRN
Status: DISPENSED | OUTPATIENT
Start: 2025-03-09 | End: 2025-03-10

## 2025-03-09 RX ORDER — MUPIROCIN 20 MG/G
OINTMENT TOPICAL
Status: CANCELLED | OUTPATIENT
Start: 2025-03-09

## 2025-03-09 RX ORDER — KETOROLAC TROMETHAMINE 30 MG/ML
INJECTION, SOLUTION INTRAMUSCULAR; INTRAVENOUS
Status: DISCONTINUED | OUTPATIENT
Start: 2025-03-09 | End: 2025-03-09

## 2025-03-09 RX ORDER — OXYCODONE AND ACETAMINOPHEN 10; 325 MG/1; MG/1
1 TABLET ORAL EVERY 4 HOURS PRN
Status: DISCONTINUED | OUTPATIENT
Start: 2025-03-09 | End: 2025-03-09

## 2025-03-09 RX ORDER — BUPIVACAINE HYDROCHLORIDE 7.5 MG/ML
INJECTION INTRAVENOUS
Status: DISCONTINUED | OUTPATIENT
Start: 2025-03-09 | End: 2025-03-09

## 2025-03-09 RX ORDER — BUPIVACAINE HYDROCHLORIDE 2.5 MG/ML
20 INJECTION, SOLUTION EPIDURAL; INFILTRATION; INTRACAUDAL; PERINEURAL ONCE
Status: DISCONTINUED | OUTPATIENT
Start: 2025-03-09 | End: 2025-03-09

## 2025-03-09 RX ORDER — OXYCODONE HYDROCHLORIDE 5 MG/1
5 TABLET ORAL EVERY 4 HOURS PRN
Status: ACTIVE | OUTPATIENT
Start: 2025-03-09 | End: 2025-03-10

## 2025-03-09 RX ORDER — MUPIROCIN 20 MG/G
OINTMENT TOPICAL DAILY
Status: DISCONTINUED | OUTPATIENT
Start: 2025-03-09 | End: 2025-03-11 | Stop reason: HOSPADM

## 2025-03-09 RX ORDER — BISACODYL 10 MG/1
10 SUPPOSITORY RECTAL ONCE AS NEEDED
Status: DISCONTINUED | OUTPATIENT
Start: 2025-03-09 | End: 2025-03-11 | Stop reason: HOSPADM

## 2025-03-09 RX ORDER — SODIUM CHLORIDE, SODIUM LACTATE, POTASSIUM CHLORIDE, CALCIUM CHLORIDE 600; 310; 30; 20 MG/100ML; MG/100ML; MG/100ML; MG/100ML
INJECTION, SOLUTION INTRAVENOUS CONTINUOUS
Status: DISCONTINUED | OUTPATIENT
Start: 2025-03-09 | End: 2025-03-10

## 2025-03-09 RX ORDER — METHYLERGONOVINE MALEATE 0.2 MG/ML
200 INJECTION INTRAVENOUS ONCE AS NEEDED
Status: DISCONTINUED | OUTPATIENT
Start: 2025-03-09 | End: 2025-03-11 | Stop reason: HOSPADM

## 2025-03-09 RX ORDER — BUPIVACAINE HYDROCHLORIDE 2.5 MG/ML
INJECTION, SOLUTION EPIDURAL; INFILTRATION; INTRACAUDAL; PERINEURAL
Status: COMPLETED
Start: 2025-03-09 | End: 2025-03-09

## 2025-03-09 RX ORDER — DIPHENHYDRAMINE HCL 25 MG
25 CAPSULE ORAL EVERY 4 HOURS PRN
Status: DISCONTINUED | OUTPATIENT
Start: 2025-03-09 | End: 2025-03-11 | Stop reason: HOSPADM

## 2025-03-09 RX ORDER — MORPHINE SULFATE 4 MG/ML
2 INJECTION, SOLUTION INTRAMUSCULAR; INTRAVENOUS EVERY 4 HOURS PRN
Status: ACTIVE | OUTPATIENT
Start: 2025-03-09 | End: 2025-03-10

## 2025-03-09 RX ORDER — MISOPROSTOL 200 UG/1
800 TABLET ORAL
Status: DISCONTINUED | OUTPATIENT
Start: 2025-03-09 | End: 2025-03-11 | Stop reason: HOSPADM

## 2025-03-09 RX ORDER — DIPHENHYDRAMINE HYDROCHLORIDE 50 MG/ML
12.5 INJECTION, SOLUTION INTRAMUSCULAR; INTRAVENOUS NIGHTLY PRN
Status: DISCONTINUED | OUTPATIENT
Start: 2025-03-09 | End: 2025-03-11 | Stop reason: HOSPADM

## 2025-03-09 RX ORDER — ONDANSETRON 8 MG/1
8 TABLET, ORALLY DISINTEGRATING ORAL EVERY 8 HOURS PRN
Status: DISCONTINUED | OUTPATIENT
Start: 2025-03-09 | End: 2025-03-09

## 2025-03-09 RX ORDER — DEXAMETHASONE SODIUM PHOSPHATE 4 MG/ML
INJECTION, SOLUTION INTRA-ARTICULAR; INTRALESIONAL; INTRAMUSCULAR; INTRAVENOUS; SOFT TISSUE
Status: DISCONTINUED | OUTPATIENT
Start: 2025-03-09 | End: 2025-03-09

## 2025-03-09 RX ORDER — ONDANSETRON HYDROCHLORIDE 2 MG/ML
INJECTION, SOLUTION INTRAVENOUS
Status: DISCONTINUED | OUTPATIENT
Start: 2025-03-09 | End: 2025-03-09

## 2025-03-09 RX ORDER — OXYTOCIN-SODIUM CHLORIDE 0.9% IV SOLN 30 UNIT/500ML 30-0.9/5 UT/ML-%
SOLUTION INTRAVENOUS CONTINUOUS PRN
Status: DISCONTINUED | OUTPATIENT
Start: 2025-03-09 | End: 2025-03-09

## 2025-03-09 RX ORDER — PRENATAL WITH FERROUS FUM AND FOLIC ACID 3080; 920; 120; 400; 22; 1.84; 3; 20; 10; 1; 12; 200; 27; 25; 2 [IU]/1; [IU]/1; MG/1; [IU]/1; MG/1; MG/1; MG/1; MG/1; MG/1; MG/1; UG/1; MG/1; MG/1; MG/1; MG/1
1 TABLET ORAL DAILY
Status: DISCONTINUED | OUTPATIENT
Start: 2025-03-09 | End: 2025-03-11 | Stop reason: HOSPADM

## 2025-03-09 RX ORDER — OXYTOCIN-SODIUM CHLORIDE 0.9% IV SOLN 30 UNIT/500ML 30-0.9/5 UT/ML-%
95 SOLUTION INTRAVENOUS CONTINUOUS PRN
Status: CANCELLED | OUTPATIENT
Start: 2025-03-09

## 2025-03-09 RX ADMIN — SODIUM CITRATE AND CITRIC ACID MONOHYDRATE 30 ML: 500; 334 SOLUTION ORAL at 08:03

## 2025-03-09 RX ADMIN — Medication 334 ML/HR: at 10:03

## 2025-03-09 RX ADMIN — DOCUSATE SODIUM 200 MG: 100 CAPSULE, LIQUID FILLED ORAL at 08:03

## 2025-03-09 RX ADMIN — DEXAMETHASONE SODIUM PHOSPHATE 4 MG: 4 INJECTION, SOLUTION INTRA-ARTICULAR; INTRALESIONAL; INTRAMUSCULAR; INTRAVENOUS; SOFT TISSUE at 10:03

## 2025-03-09 RX ADMIN — ACETAMINOPHEN 650 MG: 325 TABLET ORAL at 07:03

## 2025-03-09 RX ADMIN — PHENYLEPHRINE HYDROCHLORIDE 200 MCG: 10 INJECTION INTRAVENOUS at 10:03

## 2025-03-09 RX ADMIN — ONDANSETRON 4 MG: 2 INJECTION, SOLUTION INTRAMUSCULAR; INTRAVENOUS at 09:03

## 2025-03-09 RX ADMIN — SODIUM CHLORIDE 500 MG: 900 INJECTION INTRAVENOUS at 10:03

## 2025-03-09 RX ADMIN — PHENYLEPHRINE HYDROCHLORIDE 0.3 MCG/KG/MIN: 10 INJECTION INTRAVENOUS at 10:03

## 2025-03-09 RX ADMIN — CEFAZOLIN 2 G: 2 INJECTION, POWDER, FOR SOLUTION INTRAMUSCULAR; INTRAVENOUS at 09:03

## 2025-03-09 RX ADMIN — SODIUM CHLORIDE, POTASSIUM CHLORIDE, SODIUM LACTATE AND CALCIUM CHLORIDE: 600; 310; 30; 20 INJECTION, SOLUTION INTRAVENOUS at 06:03

## 2025-03-09 RX ADMIN — MUPIROCIN: 20 OINTMENT TOPICAL at 08:03

## 2025-03-09 RX ADMIN — LIDOCAINE HYDROCHLORIDE 30 MG: 10 INJECTION, SOLUTION EPIDURAL; INFILTRATION; INTRACAUDAL; PERINEURAL at 10:03

## 2025-03-09 RX ADMIN — ONDANSETRON 4 MG: 2 INJECTION INTRAMUSCULAR; INTRAVENOUS at 02:03

## 2025-03-09 RX ADMIN — KETOROLAC TROMETHAMINE 30 MG: 30 INJECTION, SOLUTION INTRAMUSCULAR; INTRAVENOUS at 07:03

## 2025-03-09 RX ADMIN — METFORMIN HYDROCHLORIDE 500 MG: 500 TABLET, FILM COATED ORAL at 05:03

## 2025-03-09 RX ADMIN — BUPIVACAINE HYDROCHLORIDE IN DEXTROSE 1.6 ML: 7.5 INJECTION, SOLUTION SUBARACHNOID at 10:03

## 2025-03-09 RX ADMIN — BUPIVACAINE HYDROCHLORIDE 75 MG: 2.5 INJECTION, SOLUTION EPIDURAL; INFILTRATION; INTRACAUDAL at 10:03

## 2025-03-09 RX ADMIN — KETOROLAC TROMETHAMINE 30 MG: 30 INJECTION, SOLUTION INTRAMUSCULAR; INTRAVENOUS at 11:03

## 2025-03-09 RX ADMIN — ACETAMINOPHEN 1000 MG: 10 INJECTION, SOLUTION INTRAVENOUS at 11:03

## 2025-03-09 RX ADMIN — FAMOTIDINE 20 MG: 10 INJECTION, SOLUTION INTRAVENOUS at 08:03

## 2025-03-09 RX ADMIN — OXYCODONE HYDROCHLORIDE 10 MG: 10 TABLET ORAL at 01:03

## 2025-03-09 RX ADMIN — MORPHINE SULFATE 0.1 MG: 0.5 INJECTION, SOLUTION EPIDURAL; INTRATHECAL; INTRAVENOUS at 10:03

## 2025-03-09 RX ADMIN — FENTANYL CITRATE 10 MCG: 50 INJECTION INTRAMUSCULAR; INTRAVENOUS at 10:03

## 2025-03-09 NOTE — H&P
[facility] [place] Labor & Delivery  Obstetrics  History & Physical    Patient Name: Jose Hernandez  MRN: [id number]  Admission Date: 3/9/2025  Primary Care Provider: Marie, Primary Doctor    Subjective:     Principal Problem: premature rupture of membranes in third trimester    History of Present Illness: 35 yo  with IUP@ 36.4 weeks presents to [facility] with complaints of leakage of fluid. Occasional contractions. Pregnancy complicated by the following: Type 2 DM, Previous C/S*2, undesired fertility     Obstetric HPI:  Patient reports Intensity: mild contractions, active fetal movement, no vaginal bleeding , Yes loss of fluid     This pregnancy has been complicated by Type 2 DM,AMA, prev C/S,     OB History    Para Term  AB Living   3 2 1 1 0 2   SAB IAB Ectopic Multiple Live Births   0 0 0 0 2      # Outcome Date GA Lbr Kev/2nd Weight Sex Type Anes PTL Lv   3 Current            2  17 28w0d  1.276 kg (2 lb 13 oz) M CS-Unspec  N SAUL      Complications: Fetal Intolerance, Fetal growth restriction antepartum   1 Term 06 40w0d  3.09 kg (6 lb 13 oz) M CS-Unspec  N SAUL     Past Medical History:   Diagnosis Date    Diabetes mellitus, type 2     Hidradenitis suppurativa      Past Surgical History:   Procedure Laterality Date    COLD KNIFE CONIZATION OF CERVIX N/A 2023    Procedure: CONE BIOPSY, CERVIX, USING COLD KNIFE;  Surgeon: Tammy Martinez MD;  Location: [facility] OR;  Service: OB/GYN;  Laterality: N/A;    INCISION AND DRAINAGE OF GENITAL LABIA Left 2023    Procedure: INCISION AND DRAINAGE, VULVAR ABSCESS;  Surgeon: Tammy Martinez MD;  Location: [facility] OR;  Service: OB/GYN;  Laterality: Left;       PTA Medications   Medication Sig    aspirin (ECOTRIN) 81 MG EC tablet Take 1 tablet (81 mg total) by mouth once daily.    blood sugar diagnostic Strp 1 strip by Misc.(Non-Drug; Combo Route) route 4 (four) times daily.    blood-glucose meter Misc 1 each by  "Misc.(Non-Drug; Combo Route) route once. for 1 dose    blood-glucose sensor (DEXCOM G7 SENSOR) Krysten Change sensor once every 10 days.    cephALEXin (KEFLEX) 500 MG capsule Take 1 capsule (500 mg total) by mouth every 12 (twelve) hours.    ferrous sulfate 325 (65 FE) MG EC tablet Take 1 tablet (325 mg total) by mouth every other day.    insulin aspart U-100 (NOVOLOG FLEXPEN U-100 INSULIN) 100 unit/mL (3 mL) InPn pen Inject 10 units plus sliding scale into the skin before meals and at bedtime based on sliding scale. Max TDD 75u    insulin glargine U-300 conc (TOUJEO MAX SOLOSTAR) 300 unit/mL (3 mL) insulin pen Inject 24 units into the skin at twice daily, in the morning and at bedtime    lancets 33 gauge Misc 1 lancet  by Misc.(Non-Drug; Combo Route) route 4 (four) times daily.    metFORMIN (GLUCOPHAGE) 1000 MG tablet Take 1 tablet (1,000 mg total) by mouth 2 (two) times daily with meals.    pen needle, diabetic 32 gauge x 5/32" Ndle 1 each by Misc.(Non-Drug; Combo Route) route once daily.    progesterone (PROMETRIUM) 200 MG capsule Place 1 capsule (200 mg total) vaginally once daily.    TRUE METRIX GLUCOSE TEST STRIP Strp USE 1 STRIP TO TEST BLOOD SUGAR 4 TIMES A DAY    TRUEPLUS LANCETS 33 gauge Misc USE 4 TIMES A DAY       Review of patient's allergies indicates:  No Known Allergies     Family History       Problem Relation (Age of Onset)    Breast cancer Paternal Aunt          Tobacco Use    Smoking status: Every Day     Current packs/day: 1.00     Average packs/day: 1 pack/day for 18.0 years (18.0 ttl pk-yrs)     Types: Cigarettes    Smokeless tobacco: Never    Tobacco comments:     12/21/21 in smoking program    Substance and Sexual Activity    Alcohol use: Not Currently     Comment: occasionally    Drug use: Never    Sexual activity: Not Currently     Partners: Male     Comment: sometimes     Review of Systems   Objective:     Vital Signs (Most Recent):  Pulse: 94 (03/09/25 0645)  BP: 124/81 (03/09/25 " 0604)  SpO2: 100 % (25 0645) Vital Signs (24h Range):  Pulse:  [0-255] 94  SpO2:  [94 %-100 %] 100 %  BP: (120-124)/(81-88) 124/81        There is no height or weight on file to calculate BMI.    FHT: 140sCat 1 (reassuring)  TOCO:  Q 10- minutes    Physical Exam:   Constitutional: She appears well-developed.       Cardiovascular:  Normal rate.                  Abdominal: Soft.                  Skin: Skin is warm and dry.    Psychiatric: She has a normal mood and affect.       Cervix:  Dilation:  1  Effacement:  50%  Station: -3  Presentation: Vertex     Significant Labs:  Lab Results   Component Value Date    GROUPTRH AB POS 2025    HEPBSAG Non-reactive 2024       CBC:   Recent Labs   Lab 25  0632   WBC 10.14   RBC 4.46   HGB 10.9*   HCT 33.7*      MCV 76*   MCH 24.4*   MCHC 32.3     I have personallly reviewed all pertinent lab results from the last 24 hours.    Assessment/Plan:     34 y.o. female  at 36w4d for:    Active Diagnoses:    Diagnosis Date Noted POA    PRINCIPAL PROBLEM:   premature rupture of membranes in third trimester [O42.913] 2025 Yes    Previous  section [Z98.891] 2025 Not Applicable    Request for sterilization [Z30.2] 2025 Not Applicable    AMA (advanced maternal age) multigravida 35+, third trimester [O09.523] 2024 Yes    Type 2 diabetes mellitus [E11.9] 2020 Yes      Problems Resolved During this Admission:       Patient to undergo Repeat Low  transverse  Section with postpartum tubal ligation. R/B/A to surgery discussed with patient. Patient desires to proceed.    Milton Molina MD  Obstetrics  [place] - Labor & Delivery

## 2025-03-09 NOTE — ANESTHESIA PROCEDURE NOTES
Spinal    Diagnosis: Intrauterine pregnancy  Patient location during procedure: OR  Start time: 3/9/2025 10:00 AM  Timeout: 3/9/2025 9:55 AM  End time: 3/9/2025 10:08 AM    Staffing  Authorizing Provider: Yovany Tena MD  Performing Provider: Yovany Tena MD    Staffing  Performed by: Yovany Tena MD  Authorized by: Yovany Tena MD    Preanesthetic Checklist  Completed: patient identified, IV checked, risks and benefits discussed, surgical consent, monitors and equipment checked, pre-op evaluation and timeout performed  Spinal Block  Patient position: sitting  Prep: ChloraPrep  Patient monitoring: heart rate, cardiac monitor and frequent blood pressure checks  Approach: midline  Location: L3-4  Injection technique: single shot  CSF Fluid: clear free-flowing CSF  Needle  Needle type: pencil-tip   Needle gauge: 24 G  Needle length: 5 in  Additional Documentation: negative aspiration for heme and no paresthesia on injection  Needle localization: anatomical landmarks  Assessment  Sensory level: T4   Dermatomal levels determined by pinch or prick  Ease of block: easy  Patient's tolerance of the procedure: comfortable throughout block  Additional Notes      Lidocaine injected and then we waited for effect.  Also checked IV to ensure that it was flowing.   Medications:    Medications: lidocaine (PF) injection 1% - Other   30 mg - 3/9/2025 10:01:00 AM

## 2025-03-09 NOTE — TRANSFER OF CARE
Anesthesia Transfer of Care Note    Patient: Jose Hernandez    Procedure(s) Performed: Procedure(s) (LRB):   SECTION, WITH TUBAL LIGATION (N/A)    Patient location: Labor and Delivery    Anesthesia Type: spinal    Transport from OR: Transported from OR on room air with adequate spontaneous ventilation    Post pain: adequate analgesia    Post assessment: no apparent anesthetic complications    Post vital signs: stable    Level of consciousness: awake and alert    Nausea/Vomiting: no nausea/vomiting    Complications: none    Transfer of care protocol was followed      Last vitals: Visit Vitals  /75   Pulse 85   Temp 37.1 °C (98.7 °F) (Oral)   Resp 18   LMP 2024 (Exact Date)   SpO2 100%   Breastfeeding Unknown

## 2025-03-09 NOTE — ANESTHESIA PREPROCEDURE EVALUATION
Jose Hernandez is a 34 y.o. female     OB History    Para Term  AB Living   3 2 1 1  2   SAB IAB Ectopic Multiple Live Births       2      # Outcome Date GA Lbr Kev/2nd Weight Sex Type Anes PTL Lv   3 Current            2  17 28w0d  1.276 kg (2 lb 13 oz) M CS-Unspec  N SAUL      Complications: Fetal Intolerance, Fetal growth restriction antepartum   1 Term 06 40w0d  3.09 kg (6 lb 13 oz) M CS-Unspec  N SAUL       Wt Readings from Last 1 Encounters:   25 1134 107.6 kg (237 lb 4.8 oz)       BP Readings from Last 3 Encounters:   25 124/81   25 116/62   25 114/78       Problem List[1]    Past Surgical History:   Procedure Laterality Date    COLD KNIFE CONIZATION OF CERVIX N/A 2023    Procedure: CONE BIOPSY, CERVIX, USING COLD KNIFE;  Surgeon: Tammy Martinez MD;  Location: [facility] OR;  Service: OB/GYN;  Laterality: N/A;    INCISION AND DRAINAGE OF GENITAL LABIA Left 2023    Procedure: INCISION AND DRAINAGE, VULVAR ABSCESS;  Surgeon: Tammy Martinez MD;  Location: [facility] OR;  Service: OB/GYN;  Laterality: Left;       Social History[2]      Chemistry        Component Value Date/Time     (L) 2025 0632    K 3.9 2025 0632     2025 0632    CO2 17 (L) 2025 0632    BUN 8 2025 0632    CREATININE 0.5 2025 0632     (H) 2025 0632        Component Value Date/Time    CALCIUM 9.0 2025 0632    ALKPHOS 133 2025 0632    AST 10 2025 0632    ALT 5 (L) 2025 0632    BILITOT 0.2 2025 0632    ESTGFRAFRICA >60 2020 0815    EGFRNONAA >60 2020 0815            Lab Results   Component Value Date    WBC 10.14 2025    HGB 10.9 (L) 2025    HCT 33.7 (L) 2025    MCV 76 (L) 2025     2025                 Pre-op Assessment    I have reviewed the  Patient Summary Reports.     I have reviewed the Nursing Notes. I have reviewed the NPO Status.   I have reviewed the Medications.     Review of Systems  Anesthesia Hx:  No problems with previous Anesthesia   History of prior surgery of interest to airway management or planning:            Denies Personal Hx of Anesthesia complications.                    Hematology/Oncology:       -- Anemia:                                  Cardiovascular:  Exercise tolerance: good    Denies Hypertension.  Denies Valvular problems/Murmurs.                                         Pulmonary:  Pulmonary Normal    Denies Asthma.                    Hepatic/GI:     GERD         Gerd          Neurological:  Neurology Normal      Denies Seizures.                                Endocrine:  Diabetes, using insulin Denies Hypothyroidism.  Denies Hyperthyroidism.  Diabetes                    Morbid Obesity / BMI > 40  Dermatological:  Hidradenitis suppurativa   Psych:  Psychiatric History anxiety                 Physical Exam  General: Well nourished, Alert, Cooperative and Oriented    Airway:  Mallampati: III   Mouth Opening: Normal  TM Distance: Normal  Tongue: Normal    Dental:  Intact        Anesthesia Plan  Type of Anesthesia, risks & benefits discussed:    Anesthesia Type: Epidural, Spinal, CSE  Intra-op Monitoring Plan: Standard ASA Monitors  Post Op Pain Control Plan: multimodal analgesia, epidural analgesia and intrathecal opioid  Induction:  IV  Informed Consent: Informed consent signed with the Patient and all parties understand the risks and agree with anesthesia plan.  All questions answered. Patient consented to blood products? Yes  ASA Score: 3  Day of Surgery Review of History & Physical: H&P Update referred to the surgeon/provider.  Anesthesia Plan Notes:     Spontaneous rupture of membranes in setting of prior C-sections.  Denies problems with anesthesia in the past.      Ready For Surgery From Anesthesia Perspective.      .           [1]   Patient Active Problem List  Diagnosis    Axillary hidradenitis suppurativa    Bipolar 1 disorder, mixed    Generalized anxiety disorder    PCOS (polycystic ovarian syndrome)    Type 2 diabetes mellitus    Mixed hyperlipidemia    Epigastric pain    Early satiety    Gastroesophageal reflux disease    Pre-existing type 2 diabetes mellitus during pregnancy in third trimester    Cervical shortening affecting pregnancy in second trimester    Smoking    Fetal arrhythmia affecting pregnancy, antepartum    History of     Unwanted fertility    Multigravida of advanced maternal age in second trimester    Tobacco use affecting pregnancy in second trimester, antepartum    Obesity in pregnancy    Low-lying placenta    Pre-existing diabetes mellitus during pregnancy in third trimester   [2]   Social History  Socioeconomic History    Marital status: Single   Tobacco Use    Smoking status: Every Day     Current packs/day: 1.00     Average packs/day: 1 pack/day for 18.0 years (18.0 ttl pk-yrs)     Types: Cigarettes    Smokeless tobacco: Never    Tobacco comments:     19 in smoking program    Substance and Sexual Activity    Alcohol use: Not Currently     Comment: occasionally    Drug use: Never    Sexual activity: Not Currently     Partners: Male     Comment: sometimes     Social Drivers of Health     Financial Resource Strain: High Risk (2024)    Overall Financial Resource Strain (CARDIA)     Difficulty of Paying Living Expenses: Hard   Food Insecurity: Food Insecurity Present (2024)    Hunger Vital Sign     Worried About Running Out of Food in the Last Year: Sometimes true     Ran Out of Food in the Last Year: Never true   Transportation Needs: No Transportation Needs (2024)    Received from [employer] [facility] and [facility] - Transportation     In the past 12 months, has lack of transportation kept you from medical appointments or from getting medications?: No      In the past 12 months, has lack of transportation kept you from meetings, work, or from getting things needed for daily living?: No   Physical Activity: Insufficiently Active (12/24/2024)    Exercise Vital Sign     Days of Exercise per Week: 1 day     Minutes of Exercise per Session: 20 min   Stress: Stress Concern Present (12/24/2024)    [employer] [place] [employer] - Occupational Stress Questionnaire     Feeling of Stress : To some extent   Housing Stability: High Risk (12/24/2024)    Housing Stability Vital Sign     Unable to Pay for Housing in the Last Year: Yes

## 2025-03-09 NOTE — L&D DELIVERY NOTE
[facility] - Labor & Delivery   Section   Operative Note    SUMMARY     Date of Procedure: 3/9/2025     Procedure: Procedure(s) (LRB):   SECTION, WITH TUBAL LIGATION (N/A)    Surgeons and Role:     * Elysia Molina MD - Primary    Assisting Surgeon: None    Pre-Operative Diagnosis: Type 2 diabetes mellitus without complication, without long-term current use of insulin [E11.9]  Unwanted fertility [Z30.09]  History of  [Z98.891]    Post-Operative Diagnosis: Post-Op Diagnosis Codes:     * Type 2 diabetes mellitus without complication, without long-term current use of insulin [E11.9]     * Unwanted fertility [Z30.09]     * History of  [Z98.891]    Anesthesia: Spinal/Epidural    Technical Procedures Used: yes           Description of the Findings of the Procedure: viable male infant, normal fallopian tubes and ovaries    Significant Surgical Tasks Conducted by the Assistant(s), if Applicable: retraction, assist with delivery of infant.     Complications: No    Blood Loss: * No values recorded between 3/9/2025  9:50 AM and 3/9/2025 11:20 AM *     With patient in supine position, the legs are  and Frazier Catheter placed and positioning to supine done.   Abdomen prepped with Chloroprep and 3 minute drying time allowed prior to draping of the abdomen.   Time out taken with OR team members.  Pfannenstiel Incision made through the skin, transverse fascial incision developed, rectus muscles  in the midline and the peritoneum entered.   no adhesions noted.  The lower uterine segment and position of the fetus identified.   Bladder flap taken down through transverse peritoneal incision.    Low Transverse Incision made through well developer lower uterine segment and extended laterally with blunt dissection.   Clear fluid noted.  Infant delivered from vertex presentation.  Cord clamped after one minute and  handed to attending nurse.  Cord blood taken, placenta delivered.  The  uterus wasnot exteriorized.  The edges of the uterine incision are grasped with Robin clamps at the angles and the inferior and superior midline edges of the incision.    Closure with running lock 0 vicryl, starting at each angle, tying in the midline.   Observation for bleeding with suture of any bleeding along the hysterotomy line.   With good hemostasis noted, the anterior pelvis is rinsed with sterile saline.   Right and left adnexa with normal anatomy.Left fallopian tube was grasped with the blair and a portion of the fallopian tube was removed via chris technique. Same procedure was performed on the right fallopian tube.     Closure of the abdomen was fascial closure with 0  PDS starting at the right angle and tying the knot at the left angle. Subcutaneous fascial layer was re-approximated with 3-) vicryl without difficulty.  Skin closure with 4 0 Vicryl subcuticular.  .          Specimens:   Specimen (24h ago, onward)       Start     Ordered    25 1059  Specimen to Pathology, Surgery Gynecology and Obstetrics  Once        Comments: Pre-op Diagnosis: Type 2 diabetes mellitus without complication, without long-term current use of insulin [E11.9]Unwanted fertility [Z30.09]History of  [Z98.891]Procedure(s): SECTION, WITH TUBAL LIGATION Number of specimens: 2Name of specimens: right uterine tube, left uterine tube     References:    Click here for ordering Quick Tip   Question Answer Comment   Procedure Type: Gynecology and Obstetrics    Specimen Class: Routine/Screening    Release to patient Immediate        25 1059                    Condition: Good    VTE Risk Mitigation (From admission, onward)           Ordered     Place sequential compression device  Until discontinued         25 0757     IP VTE HIGH RISK PATIENT  Once         25 0709                    Disposition: PACU - hemodynamically stable.    Attestation: Good         Delivery Information for Boy  Jose Hernandez    Birth information:  YOB: 2027   Time of birth: 10:32 AM   Sex: male   Head Delivery Date/Time: 3/9/2025 10:32 AM   Delivery type: , Low Transverse   Gestational Age: 36w4d       Delivery Providers    Delivering clinician: Elysia Molina MD   Provider Role    Day, My,  Surgical Tech    Tino St First Assist    Nieves Garcia RN Circulator    Candi, Deja BROWN RN Transition Nursery    Wyatt Shelton, FELICITA Respiratory Therapist              Measurements    Weight: 2850 g  Weight (lbs): 6 lb 4.5 oz  Length:          Apgars    Living status: Living  Apgar Component Scores:  1 min.:  5 min.:  10 min.:  15 min.:  20 min.:    Skin color:  0  1       Heart rate:  2  2       Reflex irritability:  2  2       Muscle tone:  2  2       Respiratory effort:  2  2       Total:  8  9       Apgars assigned by: DEJA CALLOWAY         Operative Delivery    Forceps attempted?: No  Vacuum extractor attempted?: No         Shoulder Dystocia    Shoulder dystocia present?: No                 Interventions/Resuscitation    Method: Bulb Suctioning, Tactile Stimulation, Deep Suctioning       Cord    Vessels: 3 vessels  Complications: None  Delayed Cord Clamping?: Yes  Cord Clamped Date/Time: 3/9/2025 10:33 AM  Cord Blood Disposition: Sent with Baby  Gases Sent?: No  Stem Cell Collection (by MD): No       Placenta    Placenta delivery date/time: 3/9/2025 10:33  Placenta removal: Manual removal  Placenta appearance: Intact  Placenta disposition: Discarded           Labor Events:       labor: Yes     Labor Onset Date/Time:         Dilation Complete Date/Time:         Start Pushing Date/Time:         Start Pushing Date/Time:       Rupture Date/Time: 25 0500        Rupture type: SRM (Spontaneous Rupture)        Fluid Amount:       Fluid Color: Clear              steroids: None     Antibiotics given for GBS: No     Induction: none     Indications for induction:         Augmentation:       Indications for augmentation:       Labor complications: None     Additional complications:          Cervical ripening:                     Delivery:      Episiotomy: None     Indication for Episiotomy:       Perineal Lacerations: None Repaired:      Periurethral Laceration:   Repaired:     Labial Laceration:   Repaired:     Sulcus Laceration:   Repaired:     Vaginal Laceration:   Repaired:     Cervical Laceration:   Repaired:     Repair suture:       Repair # of packets:       Last Value - EBL - Nursing (mL):       Sum - EBL - Nursing (mL): 0     Last Value - EBL - Anesthesia (mL):      Calculated QBL (mL): 619     Running total QBL (mL): 619     Vaginal Sweep Performed:       Surgicount Correct: Yes     Vaginal Packing:   Quantity:       Other providers:            Details (if applicable):  Trial of Labor No    Categorization: Repeat    Priority: Urgent   Indications for : Repeat Section   Incision Type: low transverse     Additional  information:  Forceps:    Vacuum:    Breech:    Observed anomalies    Other (Comments):

## 2025-03-09 NOTE — NURSING
Pt admitted from er for c/o srom. 35 y/o  at 36-4 weeks, previous c/s x 2.  States srom around 5am, fluid clear. For r-c/s w/ ppbtl. Placed on monitors. States she has never felt ctxs before. Nkda,  type 2 dm, on insulin, last ate at [place]. Fluid leaking out on pad, clear. Sve /-3. Srom done. Mild irreg ctxs noted. Non reactive fht's at present.

## 2025-03-10 ENCOUNTER — CLINICAL SUPPORT (OUTPATIENT)
Dept: SMOKING CESSATION | Facility: CLINIC | Age: 35
End: 2025-03-10

## 2025-03-10 DIAGNOSIS — F17.210 CIGARETTE SMOKER: Primary | ICD-10-CM

## 2025-03-10 PROBLEM — L98.8 PILONIDAL DISEASE: Status: ACTIVE | Noted: 2025-03-10

## 2025-03-10 LAB
BASOPHILS # BLD AUTO: 0.04 K/UL (ref 0–0.2)
BASOPHILS NFR BLD: 0.3 % (ref 0–1.9)
DIFFERENTIAL METHOD BLD: ABNORMAL
EOSINOPHIL # BLD AUTO: 0.1 K/UL (ref 0–0.5)
EOSINOPHIL NFR BLD: 0.5 % (ref 0–8)
ERYTHROCYTE [DISTWIDTH] IN BLOOD BY AUTOMATED COUNT: 16.1 % (ref 11.5–14.5)
HCT VFR BLD AUTO: 32 % (ref 37–48.5)
HGB BLD-MCNC: 10.3 G/DL (ref 12–16)
IMM GRANULOCYTES # BLD AUTO: 0.28 K/UL (ref 0–0.04)
IMM GRANULOCYTES NFR BLD AUTO: 1.9 % (ref 0–0.5)
LYMPHOCYTES # BLD AUTO: 2.6 K/UL (ref 1–4.8)
LYMPHOCYTES NFR BLD: 18 % (ref 18–48)
MCH RBC QN AUTO: 24.8 PG (ref 27–31)
MCHC RBC AUTO-ENTMCNC: 32.2 G/DL (ref 32–36)
MCV RBC AUTO: 77 FL (ref 82–98)
MONOCYTES # BLD AUTO: 1 K/UL (ref 0.3–1)
MONOCYTES NFR BLD: 7 % (ref 4–15)
NEUTROPHILS # BLD AUTO: 10.4 K/UL (ref 1.8–7.7)
NEUTROPHILS NFR BLD: 72.3 % (ref 38–73)
NRBC BLD-RTO: 0 /100 WBC
PLATELET # BLD AUTO: 262 K/UL (ref 150–450)
PMV BLD AUTO: 10.7 FL (ref 9.2–12.9)
POCT GLUCOSE: 116 MG/DL (ref 70–110)
POCT GLUCOSE: 124 MG/DL (ref 70–110)
RBC # BLD AUTO: 4.16 M/UL (ref 4–5.4)
WBC # BLD AUTO: 14.36 K/UL (ref 3.9–12.7)

## 2025-03-10 PROCEDURE — 25000003 PHARM REV CODE 250: Performed by: OBSTETRICS & GYNECOLOGY

## 2025-03-10 PROCEDURE — 63600175 PHARM REV CODE 636 W HCPCS: Mod: JZ,TB | Performed by: STUDENT IN AN ORGANIZED HEALTH CARE EDUCATION/TRAINING PROGRAM

## 2025-03-10 PROCEDURE — 25000003 PHARM REV CODE 250: Performed by: STUDENT IN AN ORGANIZED HEALTH CARE EDUCATION/TRAINING PROGRAM

## 2025-03-10 PROCEDURE — 36415 COLL VENOUS BLD VENIPUNCTURE: CPT | Performed by: OBSTETRICS & GYNECOLOGY

## 2025-03-10 PROCEDURE — 11000001 HC ACUTE MED/SURG PRIVATE ROOM

## 2025-03-10 PROCEDURE — 99406 BEHAV CHNG SMOKING 3-10 MIN: CPT | Mod: ,,,

## 2025-03-10 PROCEDURE — 85025 COMPLETE CBC W/AUTO DIFF WBC: CPT | Performed by: OBSTETRICS & GYNECOLOGY

## 2025-03-10 RX ORDER — FAMOTIDINE 20 MG/1
20 TABLET, FILM COATED ORAL 2 TIMES DAILY
Status: DISCONTINUED | OUTPATIENT
Start: 2025-03-10 | End: 2025-03-11 | Stop reason: HOSPADM

## 2025-03-10 RX ADMIN — KETOROLAC TROMETHAMINE 30 MG: 30 INJECTION, SOLUTION INTRAMUSCULAR; INTRAVENOUS at 12:03

## 2025-03-10 RX ADMIN — METFORMIN HYDROCHLORIDE 500 MG: 500 TABLET, FILM COATED ORAL at 09:03

## 2025-03-10 RX ADMIN — FAMOTIDINE 20 MG: 20 TABLET, FILM COATED ORAL at 09:03

## 2025-03-10 RX ADMIN — ACETAMINOPHEN 650 MG: 325 TABLET ORAL at 12:03

## 2025-03-10 RX ADMIN — PRENATAL VIT W/ FE FUMARATE-FA TAB 27-0.8 MG 1 TABLET: 27-0.8 TAB at 09:03

## 2025-03-10 RX ADMIN — METFORMIN HYDROCHLORIDE 500 MG: 500 TABLET, FILM COATED ORAL at 05:03

## 2025-03-10 RX ADMIN — KETOROLAC TROMETHAMINE 30 MG: 30 INJECTION, SOLUTION INTRAMUSCULAR; INTRAVENOUS at 05:03

## 2025-03-10 RX ADMIN — MUPIROCIN: 20 OINTMENT TOPICAL at 09:03

## 2025-03-10 RX ADMIN — DOCUSATE SODIUM 200 MG: 100 CAPSULE, LIQUID FILLED ORAL at 09:03

## 2025-03-10 RX ADMIN — MAGNESIUM HYDROXIDE 2400 MG: 400 SUSPENSION ORAL at 08:03

## 2025-03-10 RX ADMIN — SIMETHICONE 80 MG: 80 TABLET, CHEWABLE ORAL at 05:03

## 2025-03-10 RX ADMIN — SENNOSIDES AND DOCUSATE SODIUM 1 TABLET: 50; 8.6 TABLET ORAL at 08:03

## 2025-03-10 RX ADMIN — OXYCODONE HYDROCHLORIDE 10 MG: 10 TABLET ORAL at 12:03

## 2025-03-10 RX ADMIN — ACETAMINOPHEN 650 MG: 325 TABLET ORAL at 05:03

## 2025-03-10 NOTE — PROGRESS NOTES
03/10/2025      Patient is doing well with no complaints. Tolerating Po without N/V. Passing flatus. Ambulated without difficulty. Pain controlled with pain medications. Lochia is less than menses. Is breastfeeding. Had BTL.     Temp:  [97.9 °F (36.6 °C)-98.6 °F (37 °C)] 97.9 °F (36.6 °C)  Pulse:  [73-83] 83  Resp:  [18] 18  SpO2:  [98 %-100 %] 100 %  BP: ()/(61-72) 113/66      In bed, NAD, abd S/NT/ND + BS FF and below umbilicus, dressing c/d/I      A/P: 34 y.o.   s/p RLTCD/BTL PPD 1       1. Continue routine care, desires circ, possible discharge tomorrow   2. Type 2 DM: working with her endocrinologist, will restart lantus likely at 1/2 and going to retry metformin. Will do accuchecks and adjust as needed   3. AMA  4. Fetal PAC: nursery will evaluate

## 2025-03-10 NOTE — LACTATION NOTE
Pt requested assistance w/latching infant.  Pt reported infant was showing feeding cues; however, when trying to latch, infant would not latch.  Unwrapped and undressed infant.  Infant fussing and rooting.  Placed infant in football position on the R side and was able to easily hand express large drops of colostrum.  Infant actively rooting and attempting to latch.  W/pt's permission, demonstrated how to sandwich breast and advance infant to the breast.  Infant latched and pt reported feeling a tug and pull.  Infant observed to have strong tugs/pulls.  Pt denied any pain.  Swallows were audible.  Educated pt on breast compression as infant was nursing.  Reviewed signs of an effective latch.  Instructed pt to alternate sides w/feeds for stimulation and supply/demand.  Pt verbalized understanding.  Infant actively sucking during the 10 mins I was in the room and continued to feed as I left the room.

## 2025-03-10 NOTE — PROGRESS NOTES
Smoking cessation education note: Pt denies nicotine withdrawal symptoms- stating that she is not using nicotine patch ordered. Will follow up for additional smoking cessation education/

## 2025-03-10 NOTE — SUBJECTIVE & OBJECTIVE
"No current facility-administered medications on file prior to encounter.     Current Outpatient Medications on File Prior to Encounter   Medication Sig    aspirin (ECOTRIN) 81 MG EC tablet Take 1 tablet (81 mg total) by mouth once daily.    blood sugar diagnostic Strp 1 strip by Misc.(Non-Drug; Combo Route) route 4 (four) times daily.    blood-glucose meter Misc 1 each by Misc.(Non-Drug; Combo Route) route once. for 1 dose    blood-glucose sensor (DEXCOM G7 SENSOR) Krysten Change sensor once every 10 days.    cephALEXin (KEFLEX) 500 MG capsule Take 1 capsule (500 mg total) by mouth every 12 (twelve) hours.    ferrous sulfate 325 (65 FE) MG EC tablet Take 1 tablet (325 mg total) by mouth every other day.    insulin aspart U-100 (NOVOLOG FLEXPEN U-100 INSULIN) 100 unit/mL (3 mL) InPn pen Inject 10 units plus sliding scale into the skin before meals and at bedtime based on sliding scale. Max TDD 75u    insulin glargine U-300 conc (TOUJEO MAX SOLOSTAR) 300 unit/mL (3 mL) insulin pen Inject 24 units into the skin at twice daily, in the morning and at bedtime    lancets 33 gauge Misc 1 lancet  by Misc.(Non-Drug; Combo Route) route 4 (four) times daily.    metFORMIN (GLUCOPHAGE) 1000 MG tablet Take 1 tablet (1,000 mg total) by mouth 2 (two) times daily with meals.    pen needle, diabetic 32 gauge x 5/32" Ndle 1 each by Misc.(Non-Drug; Combo Route) route once daily.    progesterone (PROMETRIUM) 200 MG capsule Place 1 capsule (200 mg total) vaginally once daily.    TRUE METRIX GLUCOSE TEST STRIP Strp USE 1 STRIP TO TEST BLOOD SUGAR 4 TIMES A DAY    TRUEPLUS LANCETS 33 gauge Misc USE 4 TIMES A DAY       Review of patient's allergies indicates:  No Known Allergies    Past Medical History:   Diagnosis Date    Diabetes mellitus, type 2     Hidradenitis suppurativa      Past Surgical History:   Procedure Laterality Date     SECTION WITH TUBAL LIGATION N/A 3/9/2025    Procedure:  SECTION, WITH TUBAL LIGATION;  " Surgeon: Elysia Molina MD;  Location: [facility] L&D;  Service: OB/GYN;  Laterality: N/A;    COLD KNIFE CONIZATION OF CERVIX N/A 9/6/2023    Procedure: CONE BIOPSY, CERVIX, USING COLD KNIFE;  Surgeon: Tammy Martinez MD;  Location: [facility] OR;  Service: OB/GYN;  Laterality: N/A;    INCISION AND DRAINAGE OF GENITAL LABIA Left 9/6/2023    Procedure: INCISION AND DRAINAGE, VULVAR ABSCESS;  Surgeon: Tammy Martinez MD;  Location: [facility] OR;  Service: OB/GYN;  Laterality: Left;     Family History       Problem Relation (Age of Onset)    Breast cancer Paternal Aunt          Tobacco Use    Smoking status: Every Day     Current packs/day: 1.00     Average packs/day: 1 pack/day for 18.0 years (18.0 ttl pk-yrs)     Types: Cigarettes    Smokeless tobacco: Never    Tobacco comments:     12/21/21 in smoking program    Substance and Sexual Activity    Alcohol use: Not Currently     Comment: occasionally    Drug use: Never    Sexual activity: Not Currently     Partners: Male     Comment: sometimes     Review of Systems   Constitutional:  Negative for chills and fever.   Respiratory:  Negative for cough and shortness of breath.    Cardiovascular:  Negative for chest pain and palpitations.   Gastrointestinal:  Negative for abdominal pain, nausea and vomiting.   Genitourinary:  Negative for dysuria.   Neurological:  Negative for dizziness and light-headedness.     Objective:     Vital Signs (Most Recent):  Temp: 97.9 °F (36.6 °C) (03/10/25 0740)  Pulse: 83 (03/10/25 0740)  Resp: 18 (03/10/25 0740)  BP: 113/66 (03/10/25 0740)  SpO2: 100 % (03/10/25 0740) Vital Signs (24h Range):  Temp:  [97.9 °F (36.6 °C)-98.7 °F (37.1 °C)] 97.9 °F (36.6 °C)  Pulse:  [73-95] 83  Resp:  [16-18] 18  SpO2:  [95 %-100 %] 100 %  BP: ()/(61-80) 113/66        There is no height or weight on file to calculate BMI.     Physical Exam  Vitals reviewed.   Constitutional:       Appearance: Normal appearance.   Cardiovascular:      Rate and Rhythm: Normal rate.       Pulses: Normal pulses.   Pulmonary:      Effort: Pulmonary effort is normal.   Abdominal:      General: There is no distension.      Palpations: Abdomen is soft.   Skin:     Comments: Small area of induration at the superior aspect of the intergluteal fold consistent with pilonidal disease. No active infection. No opening or drainage.   Neurological:      General: No focal deficit present.      Mental Status: She is alert and oriented to person, place, and time.            I have reviewed all pertinent lab results within the past 24 hours.  CBC:   Recent Labs   Lab 03/10/25  0554   WBC 14.36*   RBC 4.16   HGB 10.3*   HCT 32.0*      MCV 77*   MCH 24.8*   MCHC 32.2     CMP:   Recent Labs   Lab 03/09/25  0632   *   CALCIUM 9.0   ALBUMIN 2.1*   PROT 6.9   *   K 3.9   CO2 17*      BUN 8   CREATININE 0.5   ALKPHOS 133   ALT 5*   AST 10   BILITOT 0.2       Significant Diagnostics:  I have reviewed all pertinent imaging results/findings within the past 24 hours.

## 2025-03-10 NOTE — DISCHARGE INSTRUCTIONS
"Patient Discharge Instructions for Postpartum Women    Resume Regular Diet  Increase activity gradually, no heavy lifting  Shower  No tampons, douching or sexual intercourse.  Discuss birth control options with your physician.  Wear a support bra  Return to work/school when you've been cleared by a physician    Call your physician if     *Fever of 100.4 or higher  *Persistent nausea/ vomiting  *Incisional drainage  *Heavy vaginal bleeding or large clots (Heavy bleeding is soaking 1 pad in an hour)  *Swelling and pain in arms or legs  *Severe headaches, blurred vision or fainting  *Shortness of breath  *Frequency and burning with urination  *Signs of postpartum depression, discuss these signs with your physician    Call lactation services for questions regarding feeding, nipple and breast care, and general questions about lactation.  They can be reached at [phone]         Understanding Postpartum Depression    You've just had a baby.  You know you should be excited and happy.  But instead you find yourself crying for no reason.  You may have trouble coping with your daily tasks.  You feel sad, tired, and hopeless most of the time.  You may even feel ashamed or guilty.  But what you're going through is not your fault and you can feel better.  Talk to your doctor.  He or she can help.    Depression After Childbirth    You may be weepy and tired right after giving birth.  These feelings are normal.  They're sometimes called the "baby blues."  These blues go away 2-3 weeks.  However, postpartum (meaning "after birth") depression lasts much longer and is more sever than the "baby blues."  It can make you feel sad and hopeless.  You may also fear that your baby will be harmed and worry about being a bad mother.      What is Depression?    Depression is a mood disorder that affects the way you think and feel.  The most common symptom is a feeling of deep sadness.  You may also feel as if you just can't cope with life.  "   Other symptoms include:      * Gaining or loosing weight  * Sleeping too much or too little  * Feeling tired all the time  * Feeling restless  * Fears of harming your baby   * Lack of interest in your baby  * Feeling worthless or guilty  * No longer finding pleasure in things you used to  * Having trouble thinking clearly or making decisions  * Thoughts of hurting yourself or your baby    What Causes Postpartum Depression    The exact causes of postpartum depression isn't known.  It may be due to changes in your hormones during and after childbirth.  You may also be tired from caring for your baby and adjusting to being a mother.  All these factors may make you feel depressed.  In some cases, your genes may also play a role.    Depression Can Be Treated    The good news is that there are many ways to treat postpartum depression.  Talking to your doctor is the first step toward feeling better.    Resources:    * National [place] of Mental Health  -- 359.550.5088    www.nimh.nih.gov    * National [place] on Mental Illness --459.942.6517    Www.amer[place].org    * Mental Health [place] -- 978.558.5952     Www.[facility].org    * National Suicide Hotline --956.172.9321 (800-SUICIDE)    7436-5151 [employer]  All rights reserved.  This information is not intended as a substitute for professional medical care.  Always follow up with your healthcare professional's instructions.         Breastfeeding Discharge Instructions      Feed the baby at the earliest sign of hunger or comfort  Hands to mouth, sucking motions  Rooting or searching for something to suck on  Dont wait for crying - it is a sign of distress    The feedings may be 8-12 times per 24hrs and will not follow a schedule  Avoid pacifiers and bottles for the first 4 weeks  Alternate the breast you start the feeding with, or start with the breast that feels the fullest  Switch breasts when the baby takes himself off the breast or falls asleep  Keep  offering breasts until the baby looks full, no longer gives hunger signs, and stays asleep when placed on his back in the crib  If the baby is sleepy and wont wake for a feeding, put the baby skin-to-skin dressed in a diaper against the mothers bare chest  Sleep near your baby  The baby should be positioned and latched on to the breast correctly  Chest-to-chest, chin in the breast  Babys lips are flipped outward  Babys mouth is stretched open wide like a shout  Babys sucking should feel like tugging to the mother  The baby should be drinking at the breast:  You should hear swallowing or gulping throughout the feeding  You should see milk on the babys lips when he comes off the breast  Your breasts should be softer when the baby is finished feeding  The baby should look relaxed at the end of feedings  After the 4th day and your milk is in:  The babys poop should turn bright yellow and be loose, watery, and seedy  The baby should have at least 3-4 poops the size of the palm of your hand per day  The baby should have at least 5-6 wet diapers per day  The urine should be light yellow in color  You should drink when you are thirsty and eat a healthy diet when you are    hungry.     Take naps to get the rest you need.   Take medications and/or drink alcohol only with permission of your obstetrician    or the babys pediatrician.  You can also call the Infant Risk Center,   ([phone]), Monday-Friday, 8am-5pm Central time, to get the most   up-to-date evidence-based information on the use of medications during   pregnancy and breastfeeding.      The baby should be examined by a pediatrician at 3-5 days of age.  Once your   milk comes in, the baby should be gaining at least ½ - 1oz each day and should be back to birthweight no later than 10-14 days of age.          Community Resources    [facility] Breastfeeding Warmline: [phone]    [facility] Breastfeeding Centers/Lactation  Consultants:  [facility]    [phone]  [facility] [phone]  [facility] Lactation Consultations [phone]  [facility]  [phone]    [facility]- Poison Control 1-201.849.8120 poisonhelp.org  Free medical advice 24/7 through the Poison Help Line and the online tool    WIC (Women, Infant, Children): 1-125.676.6628  [facility].la.gov/WIC  A statewide nutrition program for pregnant, breastfeeding, and postpartum women, infants, and children under 5 years old. Provides foods and nutrition information. Also provides breastfeeding support by peer counselors.    Dr. Ivan Tovar website for latch videos and general information:        www.breastfeedinginc.ca  Infant Risk Center is a call center that provides information about the safety of taking medications while breastfeeding.  Call [phone], M-F, 8am-5pm, CT.  International Lactation Consultant Association provides resources for assistance:        www.ilca.org  [place] Breastfeeding Coalition provides informationand resources for parents  and the community    http://louisianabreastfeeding.org  Zip code search of breastfeeding resources and more:                                                                              www.LaBreastfeedingSupport.org       [facility] Healthy [facility]ies:  9-481-040554-996-DUNR(2229)    [facility] Resources:  [facility] Services: desno.org  Community Health Centers are located in [facility]. Offers pediatric services, women's health services, WIC services and more.  Baby Café babycafeusa.org  Free, drop in, informal breastfeeding support groups offering professional lactation care and intervention.  [facility]OLLEY Baby Café meets on Thursdays at [facility] (Located in the Creole Room on the 1st Floor) from 1:00 PM- 3:00 PM.  [facility]/ [place] [facility]: birthmarkdoulas.[employer]  Infant  feeding drop in clinics, Lactation services, support groups, education programs  Karen shakila Weekst:  [phone]  [facility].com/groups/sonali  Free breastfeeding support group for families of color  [facility] [phone]  nolanesting.com  In person and virtual support for families through pregnancy, birth, and early parenthood.  [facility] Lactation Care, [facility] (Karen Diggs RN, IBCLC)  [phone]  [email or web address]  www.CenifyctationM&D ANTIQUES & CONSIGNMENT.[employer]  [facility] [place]- Dr. Marizol Hawkins.  [address] [place] [address]  www.Ohmx  [phone]  [email or web address]  Healthy [facility] [place].  [phone] ([place]) [phone] ([place])  [facility]/health-department/healthy-start  Serves women of childbearing age and addresses issues for pregnant women and their children from birth to age two.  La Leche League- Vijay ELENAMTM Technologiesmilagro.[email or web address]/[facility].[employer]/virgil  In person and virtual mother to mother support groups with education, information, support, and encouragement to women who want to breastfeed

## 2025-03-10 NOTE — LACTATION NOTE
0920 Follow up on BF dyad. Mom with no c/o, denies pain and states infant is nursing well. Instr mom on prevention and management of sore nipples should this occur. Instr mom on s/s of mastitis and if any occur to call her doctor right away as this is an infection of the BR and her doctor would need to diagnose and instr on any treatment. Instr mom that she may eat anything she wants unless there is something that her doctor tells her she cannot eat. Instr and encouraged mom to drink 8, 8oz glasses of water every day and whenever she feels thirsty. Instr mom that is she feels like she cannot drink any more water, encouraged mom to drink decaffeinated beverages or poweraide to stay hydrated. Mom denies having any questions or concerns. Instr mom to call for any questions or concerns. Mom states good understanding of all instr. Praise and encouragement given.     1030 Mom call for lact assist. When this RN entered room, mom has infant to BR and states infant latches but won't suckle. Instr mom on ways to wake sleepy baby including BR compressions to assist with getting BR to infant and keep infant suckling. Infant finished feeding by being relaxed with hands open, falling asleep and taking self off BR.  Mom denies having any questions or concerns. Mom instr to call for any questions or concerns. Mom states good understanding of all instr. Praise and encouragement given.     [facility] - Mother & Baby  Lactation Note - Mom    SUMMARY     Maternal Assessment    Breast Shape: Bilateral:, pendulous  Breast Density: Bilateral:, soft  Areola: Bilateral:, elastic  Nipples: Bilateral:, everted  Left Nipple Symptoms:  (denies pain)  Right Nipple Symptoms:  (denies pain)      LATCH Score         Breasts WDL    Breast WDL: WDL  Left Nipple Symptoms:  (denies pain)  Right Nipple Symptoms:  (denies pain)    Maternal Infant Feeding    Maternal Preparation: breast care, hand hygiene  Maternal Emotional State: assist needed  Infant  Positioning: clutch/football  Signs of Milk Transfer: audible swallow, infant jaw motion present, suck/swallow ratio  Pain with Feeding: no  Comfort Measures Before/During Feeding: latch adjusted, suction broken using finger  Milk Ejection Reflex: absent  Nipple Shape After Feeding, Left: everted, round  Nipple Shape After Feeding, Right: everted, lipstick  Latch Assistance: yes    Lactation Referrals    Community Referrals: outpatient lactation program  Outpatient Lactation Program Lactation Follow-up Date/Time: call lact ctr prn    Lactation Interventions    Breast Care: Breastfeeding: milk massaged towards nipple  Breastfeeding Assistance: assisted with positioning, support offered, feeding session observed, hand expression verified, infant latch-on verified, infant stimulated to wakeful state, infant suck/swallow verified  Breast Care: Breastfeeding: milk massaged towards nipple  Breastfeeding Assistance: assisted with positioning, support offered, feeding session observed, hand expression verified, infant latch-on verified, infant stimulated to wakeful state, infant suck/swallow verified  Breastfeeding Support: encouragement provided, maternal rest encouraged       Breastfeeding Session    Infant Positioning: clutch/football  Signs of Milk Transfer: audible swallow, infant jaw motion present, suck/swallow ratio    Maternal Information

## 2025-03-10 NOTE — CONSULTS
[facility]  General Surgery  Consult Note    Patient Name: Jose Hernandez  MRN: [id number]  Code Status: Full Code  Admission Date: 3/9/2025  Hospital Length of Stay: 1 days  Attending Physician: Tammy Martinez MD  Primary Care Provider: Marie Primary Doctor    Patient information was obtained from patient, past medical records, and ER records.     Inpatient consult to General Surgery  Consult performed by: Isaias Galvan MD  Consult ordered by: Tammy Martinez MD        Subjective:     Principal Problem:  premature rupture of membranes in third trimester    History of Present Illness: Ms. Hernandez is a pleasant 34 yoF postpartum day 1 from  and tubal ligation who has a history of pilonidal disease. She has had to have the area lanced twice in the ER before. She states that she frequently squeezes the area to ensure that the skin does not close over the area and lead to an underlying abscess. She was scheduled to see Dr. Whiting in clinic today. No fevers or chills. No drainage currently.     No current facility-administered medications on file prior to encounter.     Current Outpatient Medications on File Prior to Encounter   Medication Sig    aspirin (ECOTRIN) 81 MG EC tablet Take 1 tablet (81 mg total) by mouth once daily.    blood sugar diagnostic Strp 1 strip by Misc.(Non-Drug; Combo Route) route 4 (four) times daily.    blood-glucose meter Misc 1 each by Misc.(Non-Drug; Combo Route) route once. for 1 dose    blood-glucose sensor (DEXCOM G7 SENSOR) Krysten Change sensor once every 10 days.    cephALEXin (KEFLEX) 500 MG capsule Take 1 capsule (500 mg total) by mouth every 12 (twelve) hours.    ferrous sulfate 325 (65 FE) MG EC tablet Take 1 tablet (325 mg total) by mouth every other day.    insulin aspart U-100 (NOVOLOG FLEXPEN U-100 INSULIN) 100 unit/mL (3 mL) InPn pen Inject 10 units plus sliding scale into the skin before meals and at bedtime based on sliding scale. Max TDD  "75u    insulin glargine U-300 conc (TOUJEO MAX SOLOSTAR) 300 unit/mL (3 mL) insulin pen Inject 24 units into the skin at twice daily, in the morning and at bedtime    lancets 33 gauge Misc 1 lancet  by Misc.(Non-Drug; Combo Route) route 4 (four) times daily.    metFORMIN (GLUCOPHAGE) 1000 MG tablet Take 1 tablet (1,000 mg total) by mouth 2 (two) times daily with meals.    pen needle, diabetic 32 gauge x 5/32" Ndle 1 each by Misc.(Non-Drug; Combo Route) route once daily.    progesterone (PROMETRIUM) 200 MG capsule Place 1 capsule (200 mg total) vaginally once daily.    TRUE METRIX GLUCOSE TEST STRIP Strp USE 1 STRIP TO TEST BLOOD SUGAR 4 TIMES A DAY    TRUEPLUS LANCETS 33 gauge Misc USE 4 TIMES A DAY       Review of patient's allergies indicates:  No Known Allergies    Past Medical History:   Diagnosis Date    Diabetes mellitus, type 2     Hidradenitis suppurativa      Past Surgical History:   Procedure Laterality Date     SECTION WITH TUBAL LIGATION N/A 3/9/2025    Procedure:  SECTION, WITH TUBAL LIGATION;  Surgeon: Elysia Molina MD;  Location: [facility] L&D;  Service: OB/GYN;  Laterality: N/A;    COLD KNIFE CONIZATION OF CERVIX N/A 2023    Procedure: CONE BIOPSY, CERVIX, USING COLD KNIFE;  Surgeon: Tammy Martinez MD;  Location: [facility] OR;  Service: OB/GYN;  Laterality: N/A;    INCISION AND DRAINAGE OF GENITAL LABIA Left 2023    Procedure: INCISION AND DRAINAGE, VULVAR ABSCESS;  Surgeon: Tammy Martinez MD;  Location: [facility] OR;  Service: OB/GYN;  Laterality: Left;     Family History       Problem Relation (Age of Onset)    Breast cancer Paternal Aunt          Tobacco Use    Smoking status: Every Day     Current packs/day: 1.00     Average packs/day: 1 pack/day for 18.0 years (18.0 ttl pk-yrs)     Types: Cigarettes    Smokeless tobacco: Never    Tobacco comments:     19 in smoking program    Substance and Sexual Activity    Alcohol use: Not Currently     Comment: occasionally    Drug use: Never "    Sexual activity: Not Currently     Partners: Male     Comment: sometimes     Review of Systems   Constitutional:  Negative for chills and fever.   Respiratory:  Negative for cough and shortness of breath.    Cardiovascular:  Negative for chest pain and palpitations.   Gastrointestinal:  Negative for abdominal pain, nausea and vomiting.   Genitourinary:  Negative for dysuria.   Neurological:  Negative for dizziness and light-headedness.     Objective:     Vital Signs (Most Recent):  Temp: 97.9 °F (36.6 °C) (03/10/25 0740)  Pulse: 83 (03/10/25 0740)  Resp: 18 (03/10/25 0740)  BP: 113/66 (03/10/25 0740)  SpO2: 100 % (03/10/25 0740) Vital Signs (24h Range):  Temp:  [97.9 °F (36.6 °C)-98.7 °F (37.1 °C)] 97.9 °F (36.6 °C)  Pulse:  [73-95] 83  Resp:  [16-18] 18  SpO2:  [95 %-100 %] 100 %  BP: ()/(61-80) 113/66        There is no height or weight on file to calculate BMI.     Physical Exam  Vitals reviewed.   Constitutional:       Appearance: Normal appearance.   Cardiovascular:      Rate and Rhythm: Normal rate.      Pulses: Normal pulses.   Pulmonary:      Effort: Pulmonary effort is normal.   Abdominal:      General: There is no distension.      Palpations: Abdomen is soft.   Skin:     Comments: Small area of induration at the superior aspect of the intergluteal fold consistent with pilonidal disease. No active infection. No opening or drainage.   Neurological:      General: No focal deficit present.      Mental Status: She is alert and oriented to person, place, and time.            I have reviewed all pertinent lab results within the past 24 hours.  CBC:   Recent Labs   Lab 03/10/25  0554   WBC 14.36*   RBC 4.16   HGB 10.3*   HCT 32.0*      MCV 77*   MCH 24.8*   MCHC 32.2     CMP:   Recent Labs   Lab 03/09/25  0632   *   CALCIUM 9.0   ALBUMIN 2.1*   PROT 6.9   *   K 3.9   CO2 17*      BUN 8   CREATININE 0.5   ALKPHOS 133   ALT 5*   AST 10   BILITOT 0.2       Significant  Diagnostics:  I have reviewed all pertinent imaging results/findings within the past 24 hours.    Assessment/Plan:     Pilonidal disease  34 yoF s/p  and tubal ligation on 3/9 with chronic pilonidal disease. No active infection.    - No surgical intervention needed at this time  - Follow up with Dr. Whiting in clinic      VTE Risk Mitigation (From admission, onward)           Ordered     IP VTE HIGH RISK PATIENT  Once         25 1135     Place sequential compression device  Until discontinued         25 1135     Place sequential compression device  Until discontinued         25 0757                    Thank you for your consult. I will sign off. Please contact us if you have any additional questions.    Isaias Galvan MD  General Surgery  [facility]

## 2025-03-10 NOTE — HPI
Ms. Hernandez is a pleasant 34 yoF postpartum day 1 from  and tubal ligation who has a history of pilonidal disease. She has had to have the area lanced twice in the ER before. She states that she frequently squeezes the area to ensure that the skin does not close over the area and lead to an underlying abscess. She was scheduled to see Dr. Whiting in clinic today. No fevers or chills. No drainage currently.

## 2025-03-10 NOTE — NURSING
0750= offered Metformin at this time. Pt declined at present, and states will take with other am meds.

## 2025-03-10 NOTE — ASSESSMENT & PLAN NOTE
34 yoF s/p  and tubal ligation on 3/9 with chronic pilonidal disease. No active infection.    - No surgical intervention needed at this time  - Follow up with Dr. Whiting in clinic

## 2025-03-10 NOTE — ANESTHESIA POSTPROCEDURE EVALUATION
Anesthesia Post Evaluation    Patient: Jose Hernandez    Procedure(s) Performed: Procedure(s) (LRB):   SECTION, WITH TUBAL LIGATION (N/A)    Final Anesthesia Type: spinal      Patient location during evaluation: labor & delivery  Patient participation: Yes- Able to Participate  Level of consciousness: awake  Post-procedure vital signs: reviewed and stable  Pain management: adequate  Airway patency: patent    PONV status at discharge: No PONV  Anesthetic complications: no      Cardiovascular status: blood pressure returned to baseline  Respiratory status: unassisted  Hydration status: euvolemic  Follow-up not needed.  Comments: No issues after spinal.               Vitals Value Taken Time   /61 03/10/25 04:00   Temp 37 °C (98.6 °F) 03/10/25 04:00   Pulse 76 03/10/25 04:00   Resp 18 03/10/25 04:00   SpO2 98 % 25 14:30         No case tracking events are documented in the log.      Pain/Stacy Score: Pain Rating Prior to Med Admin: 0 (3/10/2025  5:54 AM)  Pain Rating Post Med Admin: 0 (3/10/2025  1:02 AM)

## 2025-03-10 NOTE — LACTATION NOTE
Rounded on pt.  Infant had just finished nursing prior to coming in.  Reviewed BF basics, reinforced waking/stimulating infant for feeds, instructed pt to call for BF assistance.      [facility]  Lactation Note - Mom    SUMMARY     Maternal Assessment    Breast Shape: Bilateral:, pendulous  Breast Density: Bilateral:, soft  Areola: Bilateral:, elastic  Nipples: Bilateral:, everted  Left Nipple Symptoms: other (see comments) (denies pain)  Right Nipple Symptoms: other (see comments) (denies pain)      LATCH Score         Breasts WDL    Breast WDL: WDL  Left Nipple Symptoms: other (see comments) (denies pain)  Right Nipple Symptoms: other (see comments) (denies pain)    Maternal Infant Feeding    Maternal Preparation: breast care, hand hygiene  Maternal Emotional State: relaxed  Pain with Feeding: no    Lactation Referrals    Community Referrals: outpatient lactation program, support group  Outpatient Lactation Program Lactation Follow-up Date/Time: call lact. ctr as needed  Support Group Lactation Follow-up Date/Time: pediatrician f/u 1-2 days from discharge    Lactation Interventions    Breast Care: Breastfeeding: open to air  Breastfeeding Assistance: support offered, feeding cue recognition promoted, feeding on demand promoted  Breast Care: Breastfeeding: open to air  Breastfeeding Assistance: support offered, feeding cue recognition promoted, feeding on demand promoted  Breastfeeding Support: diary/feeding log utilized, encouragement provided, infant-mother separation minimized, lactation counseling provided, maternal hydration promoted, maternal nutrition promoted, maternal rest encouraged       Breastfeeding Session         Maternal Information

## 2025-03-11 ENCOUNTER — CLINICAL SUPPORT (OUTPATIENT)
Dept: SMOKING CESSATION | Facility: CLINIC | Age: 35
End: 2025-03-11

## 2025-03-11 VITALS
DIASTOLIC BLOOD PRESSURE: 68 MMHG | TEMPERATURE: 98 F | HEART RATE: 86 BPM | SYSTOLIC BLOOD PRESSURE: 125 MMHG | RESPIRATION RATE: 18 BRPM | OXYGEN SATURATION: 100 %

## 2025-03-11 DIAGNOSIS — F17.210 CIGARETTE SMOKER: Primary | ICD-10-CM

## 2025-03-11 LAB — POCT GLUCOSE: 107 MG/DL (ref 70–110)

## 2025-03-11 PROCEDURE — 99238 HOSP IP/OBS DSCHRG MGMT 30/<: CPT | Mod: ,,, | Performed by: OBSTETRICS & GYNECOLOGY

## 2025-03-11 PROCEDURE — 25000003 PHARM REV CODE 250: Performed by: STUDENT IN AN ORGANIZED HEALTH CARE EDUCATION/TRAINING PROGRAM

## 2025-03-11 PROCEDURE — 25000003 PHARM REV CODE 250: Performed by: OBSTETRICS & GYNECOLOGY

## 2025-03-11 PROCEDURE — 99406 BEHAV CHNG SMOKING 3-10 MIN: CPT | Mod: ,,,

## 2025-03-11 RX ORDER — OXYCODONE AND ACETAMINOPHEN 5; 325 MG/1; MG/1
1 TABLET ORAL EVERY 4 HOURS PRN
Qty: 30 TABLET | Refills: 0 | Status: SHIPPED | OUTPATIENT
Start: 2025-03-11

## 2025-03-11 RX ORDER — IBUPROFEN 600 MG/1
600 TABLET ORAL EVERY 6 HOURS
Status: DISCONTINUED | OUTPATIENT
Start: 2025-03-11 | End: 2025-03-11 | Stop reason: HOSPADM

## 2025-03-11 RX ORDER — OXYCODONE AND ACETAMINOPHEN 5; 325 MG/1; MG/1
1 TABLET ORAL EVERY 4 HOURS PRN
Refills: 0 | Status: DISCONTINUED | OUTPATIENT
Start: 2025-03-11 | End: 2025-03-11 | Stop reason: HOSPADM

## 2025-03-11 RX ORDER — IBUPROFEN 800 MG/1
800 TABLET ORAL EVERY 8 HOURS PRN
Qty: 30 TABLET | Refills: 2 | Status: SHIPPED | OUTPATIENT
Start: 2025-03-11 | End: 2026-03-11

## 2025-03-11 RX ORDER — OXYCODONE AND ACETAMINOPHEN 10; 325 MG/1; MG/1
1 TABLET ORAL EVERY 4 HOURS PRN
Refills: 0 | Status: DISCONTINUED | OUTPATIENT
Start: 2025-03-11 | End: 2025-03-11 | Stop reason: HOSPADM

## 2025-03-11 RX ADMIN — METFORMIN HYDROCHLORIDE 500 MG: 500 TABLET, FILM COATED ORAL at 09:03

## 2025-03-11 RX ADMIN — OXYCODONE AND ACETAMINOPHEN 1 TABLET: 10; 325 TABLET ORAL at 04:03

## 2025-03-11 RX ADMIN — SIMETHICONE 80 MG: 80 TABLET, CHEWABLE ORAL at 12:03

## 2025-03-11 RX ADMIN — FAMOTIDINE 20 MG: 20 TABLET, FILM COATED ORAL at 09:03

## 2025-03-11 RX ADMIN — OXYCODONE AND ACETAMINOPHEN 1 TABLET: 10; 325 TABLET ORAL at 12:03

## 2025-03-11 RX ADMIN — IBUPROFEN 600 MG: 600 TABLET, FILM COATED ORAL at 12:03

## 2025-03-11 RX ADMIN — PRENATAL VIT W/ FE FUMARATE-FA TAB 27-0.8 MG 1 TABLET: 27-0.8 TAB at 09:03

## 2025-03-11 RX ADMIN — IBUPROFEN 600 MG: 600 TABLET, FILM COATED ORAL at 04:03

## 2025-03-11 RX ADMIN — DOCUSATE SODIUM 200 MG: 100 CAPSULE, LIQUID FILLED ORAL at 09:03

## 2025-03-11 RX ADMIN — MUPIROCIN: 20 OINTMENT TOPICAL at 09:03

## 2025-03-11 NOTE — PROGRESS NOTES
"  Smoking cessation education and handouts provided, including the dangers of 2nd and 3rd hand smoke, [place] Act 838 prohibiting smoking in a motor vehicle with a child under age 13 in the vehicle, as well as strategies used by [facility]'s Ambulatory Smoking Cessation clinic. Pt states that she and her partner both smoke inside their home, and limit smoking to kitchen and bathroom. Encouraged pt to consider establishing an "outdoor only" smoking policy at home, and promoting clean indoor air, particularly with increased risk of SIDS, respiratory ailments and other illnesses for  and other children in the home. Pt declines referral to Ambulatory Smoking Cessation clinic at this time stating not ready to quit. Strongly urged pt to consider a quit attempt and to contact clinic if additional resources are needed.   "

## 2025-03-11 NOTE — PLAN OF CARE
Discharge instructions given to patient verbally and in writing. Verbalized understanding. Received Mother-Baby care guide during hospital stay. Prescriptions given with explanation for use. Verbalized understanding. States she feels comfortable taking care of baby and has demonstrated ability to care for  and herself. Says she will have assistance when she returns home.

## 2025-03-11 NOTE — PROGRESS NOTES
2025      Patient is doing well with no complaints. Tolerating Po without N/V. Passing flatus. Ambulated without difficulty. Pain controlled with pain medications. Lochia is less than menses. Is breastfeeding. Had BTL.     Temp:  [97.9 °F (36.6 °C)-98.8 °F (37.1 °C)] 98.1 °F (36.7 °C)  Pulse:  [82-96] 86  Resp:  [18-20] 20  SpO2:  [97 %-100 %] 100 %  BP: (118-125)/(68-82) 125/68      In bed, NAD, abd S/NT/ND + BS FF and below umbilicus, dressing c/d/I      A/P: 34 y.o.   s/p RLTCD/BTL PPD 2       1. Continue routine care, desires circ, possible discharge today  2. Type 2 DM: working with her endocrinologist, will restart lantus likely at 1/2 and going to retry metformin. Will do accuchecks and adjust as needed   3. AMA  4. Fetal PAC: nursery will evaluate

## 2025-03-11 NOTE — LACTATION NOTE
Rounded on couplet. Mom reported that abdoul. Nipples are tender, stating that baby seems to be latching only onto nipple often. Offered assistance with breastfeeding and mom accepted. Encouraged awakening techniques, such as unswaddling/undressing, changing baby's diaper, and placing baby skin to skin. Asked mom if she knew how to hand express and she stated yes. Large drops of colostrum observed to right nipple. Also observed abdoul. Breasts to point downward. Offered use of blanket roll beneath breast to help support breast upward.Assisted mom with providing pillow support and placed baby in football position. Instructed mom to apply nipple to baby's upper lip/nose and wait for baby to open mouth wide for deep latch. Baby latched and began heartily sucking with swallowing observed.  Baby required some gentle stroking of hands and feet to stay awake. Educated mom about importance of ensuring deep latch and watching for efficient sucks/swallowing.  While breastfeeding on right breast, it was observed that left breast was leaking milk. Educated mom about how baby's GA of 36.4 weeks at birth means that he may fatigue easily at breast. Encouraged her to use breast compressions while feeding and to express milk into baby's mouth after feedings to provide him with extra milk. Discussed collected extra milk leaking from other breast and feeding to baby. Also encouraged her to consider pumping after feedings to provide additional breast stimulation. Mom verbalized understanding.  Discharge teaching done.Mom verbalized understanding.    [facility]  Lactation Note - Mom    SUMMARY     Maternal Assessment    Breast Shape: Bilateral:, pendulous  Breast Density: Bilateral:, full  Areola: Bilateral:, elastic  Nipples: Bilateral:, everted  Left Nipple Symptoms: tender, leaking (lactating)  Right Nipple Symptoms: tender, leaking (lactating)      LATCH Score         Breasts WDL    Breast WDL: WDL except, nipple  symptoms  Left Nipple Symptoms: tender, leaking (lactating)  Right Nipple Symptoms: tender, leaking (lactating)    Maternal Infant Feeding    Maternal Preparation: hand hygiene, breast care  Maternal Emotional State: assist needed, relaxed  Infant Positioning: clutch/football  Signs of Milk Transfer: audible swallow, infant jaw motion present, suck/swallow ratio  Pain with Feeding: yes (tender with initial latch)  Pain Location: nipples, bilateral  Pain Description: soreness  Comfort Measures Before/During Feeding: infant position adjusted, latch adjusted, maternal position adjusted  Milk Ejection Reflex: absent  Comfort Measures Following Feeding: expressed milk applied, air-drying encouraged, breast pads utilized (gel pads given)  Nipple Shape After Feeding, Left: everted, round  Nipple Shape After Feeding, Right: pinched  Latch Assistance: yes    Lactation Referrals    Community Referrals: outpatient lactation program, pediatric care provider, support group  Outpatient Lactation Program Lactation Follow-up Date/Time: call lact ctr prn  Pediatric Care Provider Lactation Follow-up Date/Time: follow up w peds within 1-2 days for wt check  Support Group Lactation Follow-up Date/Time: community resources given in handout    Lactation Interventions    Breast Care: Breastfeeding: breast milk to nipples, Hydrogel dressing applied, breast pads utilized, open to air, warm shower encouraged  Breastfeeding Assistance: assisted with positioning, feeding cue recognition promoted, feeding on demand promoted, feeding session observed, hand expression verified, infant latch-on verified, infant stimulated to wakeful state, infant suck/swallow verified, support offered  Breast Care: Breastfeeding: breast milk to nipples, Hydrogel dressing applied, breast pads utilized, open to air, warm shower encouraged  Breastfeeding Assistance: assisted with positioning, feeding cue recognition promoted, feeding on demand promoted, feeding  session observed, hand expression verified, infant latch-on verified, infant stimulated to wakeful state, infant suck/swallow verified, support offered  Breastfeeding Support: diary/feeding log utilized, encouragement provided, infant-mother separation minimized, lactation counseling provided, maternal nutrition promoted, maternal hydration promoted, maternal rest encouraged       Breastfeeding Session    Breast Pumping Interventions: post-feed pumping encouraged  Infant Positioning: clutch/football  Signs of Milk Transfer: audible swallow, infant jaw motion present, suck/swallow ratio    Maternal Information

## 2025-03-11 NOTE — PLAN OF CARE
Vss, nad, pt c/o abdominal discomfort - trapped gas, reports passing a little gas, reports breast feeding is going ok - starting to feel soreness in R nipple.  Poc: around the clock pain management, ambulation and po hydration encouraged, instructed pt to call for breast feeding assistance, continue w/poc.  Reviewed poc w/pt.  Pt verbalized understanding.

## 2025-03-11 NOTE — PLAN OF CARE
Note copied from Infant's chart (MRN: [id number])     SOCIAL WORK DISCHARGE PLANNING ASSESSMENT     SW completed discharge planning assessment with pt's parents. Pt's parents were easily engaged and education on the role of  was provided. Pt's parents reported all necessities for patient were obtained, including a car seat. Pt's parents reported they have good support from family and friends. Pt's father will provide transportation home following discharge. No needs for community resources were reported. Pt's parents were encouraged to call with any questions or concerns. Pt's parents verbalized understanding.      Legal Name: Silas Damian           :  3/9/2027  Address: [address] [place] [address]  Parent's Phone Numbers: pt's mother Jose Hernandez [phone] and pt's father Nael Damian [phone]     Pediatrician: Instructed to decide soon and inform RN       Birth Hospital:[facility]           JORGE: 25     Birth Weight:   2.85 kg (6 lb 4.5 oz)                Birth Length: 47cm                  Gestational Age: 36w4d           Apgars    Living status: Living  Apgar Component Scores:  1 min.:  5 min.:  10 min.:  15 min.:  20 min.:    Skin color:  0  1          Heart rate:  2  2          Reflex irritability:  2  2          Muscle tone:  2  2          Respiratory effort:  2  2          Total:  8  9          Apgars assigned by: DEJA CALLOWAY           25 1015   OB Discharge Planning Assessment   Assessment Type Discharge Planning Assessment   Source of Information family   Verified Demographic and Insurance Information Yes   Insurance Medicaid   Medicaid [employer]   Medicaid Insurance Primary   Father's Involvement Fully Involved   Is Father signing the birth certificate Yes   Father's Address [address] [place] [address]   Family Involvement Moderate   Primary Contact Name and Number pt's mother Jose Hernandez [phone] and pt's  father Nael Damian [phone]   Received Prenatal Care Yes   Transportation Anticipated family or friend will provide   Receive [facility] Benefits Already certified, will apply for new born    Arrangements Self;Family;Friends   Infant Feeding Plan breastfeeding   Does baby have crib or safe sleep space? Yes   Do you have a car seat? Yes   Has other essential care items? Clothing;Bottles;Diapers   Resources/Education Provided Preparing for Your Baby's Discharge Home   DCFS No indications (Indicators for Report)   Discharge Plan A Home with family

## 2025-03-12 LAB
FINAL PATHOLOGIC DIAGNOSIS: NORMAL
GROSS: NORMAL
Lab: NORMAL

## 2025-03-12 NOTE — DISCHARGE SUMMARY
Admitting Diagnosis: iup at 36 wga, PPROM, type 2 DM, AMA, short cervix, fetal PAC  Discharge Diagnosis: same and s/p RLTCD/BTL  Procedure: RLTCD/BTL  Service: OB-GYN, Tammy Martinez   Consults: none   Hospital Course: Patient was admitted to L&D for PROM. She had a repeat  with BTL of a viable male infant .  She was transferred to mother baby in stable condition.  Her recovery was uncomplicated and by post-partum day 2 she was tolerating PO without N/V, ambulating without difficulty, her pain was well controlled with PO pain medications and she had passed flatus. She was breastfeeding. She was stable and ready for discharge.   Medications: OTC ibuprofen, Percocet, metformin  Disposition: home   Condition: stable for discharge  Follow up: 1 week for incision check and 6 weeks for post-partum check  Instructions: Keep incision clean and dry, continue ambulation, take medications as needed and take stool softener as needed, pelvic rest until instructed otherwise by physician  Call or return to ED for fever >100.4, foul smelling vaginal discharge, heavy vaginal bleeding, abdominal pain not responsive to medications or other concerns.

## 2025-03-14 ENCOUNTER — PATIENT MESSAGE (OUTPATIENT)
Dept: ENDOCRINOLOGY | Facility: CLINIC | Age: 35
End: 2025-03-14
Payer: MEDICAID

## 2025-03-17 DIAGNOSIS — Z98.891 HISTORY OF C-SECTION: ICD-10-CM

## 2025-03-17 NOTE — TELEPHONE ENCOUNTER
Refill Routing Note   Medication(s) are not appropriate for processing by [facility] for the following reason(s):        Outside of protocol    [facility] action(s):  Route               Appointments  past 12m or future 3m with PCP    Date Provider   Last Visit   2/27/2025 Tammy Martinez MD   Next Visit   3/18/2025 Tammy Martinez MD   ED visits in past 90 days: 1        Note composed:4:15 PM 03/17/2025

## 2025-03-18 ENCOUNTER — POSTPARTUM VISIT (OUTPATIENT)
Dept: OBSTETRICS AND GYNECOLOGY | Facility: CLINIC | Age: 35
End: 2025-03-18
Payer: MEDICAID

## 2025-03-18 VITALS
HEIGHT: 62 IN | DIASTOLIC BLOOD PRESSURE: 75 MMHG | SYSTOLIC BLOOD PRESSURE: 122 MMHG | WEIGHT: 235.81 LBS | BODY MASS INDEX: 43.39 KG/M2 | HEART RATE: 100 BPM

## 2025-03-18 DIAGNOSIS — Z98.891 HISTORY OF C-SECTION: Primary | ICD-10-CM

## 2025-03-18 DIAGNOSIS — E11.9 TYPE 2 DIABETES MELLITUS WITHOUT COMPLICATION, WITHOUT LONG-TERM CURRENT USE OF INSULIN: ICD-10-CM

## 2025-03-18 DIAGNOSIS — R30.0 DYSURIA: ICD-10-CM

## 2025-03-18 PROCEDURE — 99999 PR PBB SHADOW E&M-EST. PATIENT-LVL III: CPT | Mod: PBBFAC,,, | Performed by: OBSTETRICS & GYNECOLOGY

## 2025-03-18 PROCEDURE — 87086 URINE CULTURE/COLONY COUNT: CPT | Performed by: OBSTETRICS & GYNECOLOGY

## 2025-03-18 PROCEDURE — 99213 OFFICE O/P EST LOW 20 MIN: CPT | Mod: PBBFAC,PO | Performed by: OBSTETRICS & GYNECOLOGY

## 2025-03-18 RX ORDER — OXYCODONE AND ACETAMINOPHEN 5; 325 MG/1; MG/1
1 TABLET ORAL EVERY 4 HOURS PRN
Qty: 15 TABLET | Refills: 0 | Status: SHIPPED | OUTPATIENT
Start: 2025-03-18

## 2025-03-18 RX ORDER — CEPHALEXIN 500 MG/1
500 CAPSULE ORAL EVERY 12 HOURS
Qty: 14 CAPSULE | Refills: 0 | Status: SHIPPED | OUTPATIENT
Start: 2025-03-18 | End: 2025-03-25

## 2025-03-18 NOTE — TELEPHONE ENCOUNTER
Please call her, she needs an appointment this week to take her dressing off her incision if it is still on.

## 2025-03-18 NOTE — PROGRESS NOTES
CC: follow up delivery    HPI:   Jose Hernandez is a 34 y.o. here for f/u  with BTL. She reports loose bowel movements and deep pain with urination. Pain is controlled with OTC medications and percocets but only taking twice daily. She is breastfeeding. Not getting much sleep at night so having ups and downs with mood but  Denies depression.     Vitals:    25 1416   BP: 122/75   Pulse: 100       PHYSICAL EXAM:   APPEARANCE: Well nourished, well developed, in no acute distress.  ABDOMEN: Soft. No tenderness or masses. No hernias. well healed Pfannenstiel incision, edema in panus  PELVIC: deferred       RLTCD/BTL  Type 2 DM   UTI    PLAN:   1. Discussed incision care, will see back in 1 week   2. Will treat for UTI.   3. Stopped metformin due to diarrhea, reports BS normal, wearing dexcom, f/u with endocrinology

## 2025-03-20 LAB — BACTERIA UR CULT: NORMAL

## 2025-03-21 ENCOUNTER — PATIENT MESSAGE (OUTPATIENT)
Dept: OBSTETRICS AND GYNECOLOGY | Facility: CLINIC | Age: 35
End: 2025-03-21
Payer: MEDICAID

## 2025-03-21 ENCOUNTER — PATIENT MESSAGE (OUTPATIENT)
Dept: OBSTETRICS AND GYNECOLOGY | Facility: HOSPITAL | Age: 35
End: 2025-03-21
Payer: MEDICAID

## 2025-03-26 ENCOUNTER — OFFICE VISIT (OUTPATIENT)
Dept: SURGERY | Facility: CLINIC | Age: 35
End: 2025-03-26
Payer: MEDICAID

## 2025-03-26 VITALS
TEMPERATURE: 99 F | HEIGHT: 62 IN | WEIGHT: 232.25 LBS | HEART RATE: 87 BPM | SYSTOLIC BLOOD PRESSURE: 132 MMHG | BODY MASS INDEX: 42.74 KG/M2 | OXYGEN SATURATION: 99 % | DIASTOLIC BLOOD PRESSURE: 94 MMHG

## 2025-03-26 DIAGNOSIS — L05.91 PILONIDAL CYST: ICD-10-CM

## 2025-03-26 PROCEDURE — 1159F MED LIST DOCD IN RCRD: CPT | Mod: CPTII,,, | Performed by: STUDENT IN AN ORGANIZED HEALTH CARE EDUCATION/TRAINING PROGRAM

## 2025-03-26 PROCEDURE — 3044F HG A1C LEVEL LT 7.0%: CPT | Mod: CPTII,,, | Performed by: STUDENT IN AN ORGANIZED HEALTH CARE EDUCATION/TRAINING PROGRAM

## 2025-03-26 PROCEDURE — 99203 OFFICE O/P NEW LOW 30 MIN: CPT | Mod: S$PBB,,, | Performed by: STUDENT IN AN ORGANIZED HEALTH CARE EDUCATION/TRAINING PROGRAM

## 2025-03-26 PROCEDURE — 3075F SYST BP GE 130 - 139MM HG: CPT | Mod: CPTII,,, | Performed by: STUDENT IN AN ORGANIZED HEALTH CARE EDUCATION/TRAINING PROGRAM

## 2025-03-26 PROCEDURE — 99214 OFFICE O/P EST MOD 30 MIN: CPT | Mod: PBBFAC,PN | Performed by: STUDENT IN AN ORGANIZED HEALTH CARE EDUCATION/TRAINING PROGRAM

## 2025-03-26 PROCEDURE — 1111F DSCHRG MED/CURRENT MED MERGE: CPT | Mod: CPTII,,, | Performed by: STUDENT IN AN ORGANIZED HEALTH CARE EDUCATION/TRAINING PROGRAM

## 2025-03-26 PROCEDURE — 99999 PR PBB SHADOW E&M-EST. PATIENT-LVL IV: CPT | Mod: PBBFAC,,, | Performed by: STUDENT IN AN ORGANIZED HEALTH CARE EDUCATION/TRAINING PROGRAM

## 2025-03-26 PROCEDURE — 1160F RVW MEDS BY RX/DR IN RCRD: CPT | Mod: CPTII,,, | Performed by: STUDENT IN AN ORGANIZED HEALTH CARE EDUCATION/TRAINING PROGRAM

## 2025-03-26 PROCEDURE — 3080F DIAST BP >= 90 MM HG: CPT | Mod: CPTII,,, | Performed by: STUDENT IN AN ORGANIZED HEALTH CARE EDUCATION/TRAINING PROGRAM

## 2025-03-26 PROCEDURE — 3008F BODY MASS INDEX DOCD: CPT | Mod: CPTII,,, | Performed by: STUDENT IN AN ORGANIZED HEALTH CARE EDUCATION/TRAINING PROGRAM

## 2025-03-28 ENCOUNTER — POSTPARTUM VISIT (OUTPATIENT)
Dept: OBSTETRICS AND GYNECOLOGY | Facility: CLINIC | Age: 35
End: 2025-03-28
Payer: MEDICAID

## 2025-03-28 VITALS — WEIGHT: 293 LBS | SYSTOLIC BLOOD PRESSURE: 122 MMHG | BODY MASS INDEX: 95.93 KG/M2 | DIASTOLIC BLOOD PRESSURE: 76 MMHG

## 2025-03-28 DIAGNOSIS — R19.7 DIARRHEA, UNSPECIFIED TYPE: ICD-10-CM

## 2025-03-28 DIAGNOSIS — O24.313 PRE-EXISTING DIABETES MELLITUS DURING PREGNANCY IN THIRD TRIMESTER: ICD-10-CM

## 2025-03-28 DIAGNOSIS — Z98.891 PREVIOUS CESAREAN SECTION: Primary | ICD-10-CM

## 2025-03-28 PROCEDURE — 99213 OFFICE O/P EST LOW 20 MIN: CPT | Mod: PBBFAC,TH,PO | Performed by: OBSTETRICS & GYNECOLOGY

## 2025-03-28 PROCEDURE — 0503F POSTPARTUM CARE VISIT: CPT | Mod: CPTII,,, | Performed by: OBSTETRICS & GYNECOLOGY

## 2025-03-28 PROCEDURE — 99999 PR PBB SHADOW E&M-EST. PATIENT-LVL III: CPT | Mod: PBBFAC,,, | Performed by: OBSTETRICS & GYNECOLOGY

## 2025-03-28 NOTE — PROGRESS NOTES
Patient ID: Jose Hernandez is a 34 y.o. female.    Chief Complaint: No chief complaint on file.      HPI:  HPI  34F with cyst at gluteal cleft for a few months. Recently pregnant, underwent csection 2 weeks ago.  Feels ok now.   No pain or drainage at cyst for now.   Never had procedure on it.     Review of Systems   Constitutional:  Negative for fever.   HENT:  Negative for trouble swallowing.    Respiratory:  Negative for shortness of breath.    Cardiovascular:  Negative for chest pain.   Gastrointestinal:  Negative for abdominal pain, blood in stool, nausea and vomiting.   Genitourinary:  Negative for dysuria.   Musculoskeletal:  Negative for gait problem.   Skin:  Negative for rash and wound.   Allergic/Immunologic: Negative for immunocompromised state.   Neurological:  Negative for weakness.   Hematological:  Does not bruise/bleed easily.   Psychiatric/Behavioral:  Negative for agitation.        Current Medications[1]    Review of patient's allergies indicates:  No Known Allergies    Past Medical History:   Diagnosis Date    Diabetes mellitus, type 2     Hidradenitis suppurativa        Past Surgical History:   Procedure Laterality Date     SECTION WITH TUBAL LIGATION N/A 3/9/2025    Procedure:  SECTION, WITH TUBAL LIGATION;  Surgeon: Elysia Molina MD;  Location: [facility];  Service: OB/GYN;  Laterality: N/A;    COLD KNIFE CONIZATION OF CERVIX N/A 2023    Procedure: CONE BIOPSY, CERVIX, USING COLD KNIFE;  Surgeon: Tammy Martinez MD;  Location: [facility];  Service: OB/GYN;  Laterality: N/A;    INCISION AND DRAINAGE OF GENITAL LABIA Left 2023    Procedure: INCISION AND DRAINAGE, VULVAR ABSCESS;  Surgeon: Tammy Martinez MD;  Location: [facility];  Service: OB/GYN;  Laterality: Left;       Social History[2]    Vitals:    25 1420   BP: (!) 132/94   Pulse: 87   Temp: 98.6 °F (37 °C)       Physical Exam  Constitutional:       General: She is not in acute distress.     Appearance: She is  "well-developed.   HENT:      Head: Normocephalic and atraumatic.   Eyes:      General: No scleral icterus.  Cardiovascular:      Rate and Rhythm: Normal rate.   Pulmonary:      Effort: Pulmonary effort is normal.      Breath sounds: No stridor.   Abdominal:      General: There is no distension.      Palpations: Abdomen is soft.      Tenderness: There is no abdominal tenderness.   Lymphadenopathy:      Cervical: No cervical adenopathy.   Skin:     General: Skin is warm.      Findings: No erythema.          Neurological:      Mental Status: She is alert and oriented to person, place, and time.   Psychiatric:         Behavior: Behavior normal.     Body mass index is 42.48 kg/m².      Assessment & Plan:  34F with pilonidal cyst  RTC in 6 weeks  Will let her recover more then plan exicison in OR             [1]   Current Outpatient Medications   Medication Sig Dispense Refill    blood sugar diagnostic Strp 1 strip by Misc.(Non-Drug; Combo Route) route 4 (four) times daily. 200 each 3    blood-glucose sensor (DEXCOM G7 SENSOR) Krysten Change sensor once every 10 days. 3 each 11    pen needle, diabetic 32 gauge x 5/32" Ndle 1 each by Misc.(Non-Drug; Combo Route) route once daily. 100 each 6    blood-glucose meter Misc 1 each by Misc.(Non-Drug; Combo Route) route once. for 1 dose 1 each 0    ferrous sulfate 325 (65 FE) MG EC tablet Take 1 tablet (325 mg total) by mouth every other day. 60 tablet 3    ibuprofen (ADVIL,MOTRIN) 800 MG tablet Take 1 tablet (800 mg total) by mouth every 8 (eight) hours as needed for Pain. 30 tablet 2    insulin glargine U-300 conc (TOUJEO MAX SOLOSTAR) 300 unit/mL (3 mL) insulin pen Inject 24 units into the skin at twice daily, in the morning and at bedtime (Patient not taking: Reported on 3/26/2025)      lancets 33 gauge Misc 1 lancet  by Misc.(Non-Drug; Combo Route) route 4 (four) times daily. (Patient not taking: Reported on 3/26/2025) 200 each 3    metFORMIN (GLUCOPHAGE) 1000 MG tablet Take 1 " tablet (1,000 mg total) by mouth 2 (two) times daily with meals. (Patient not taking: Reported on 3/26/2025) 180 tablet 3    oxyCODONE-acetaminophen (PERCOCET) 5-325 mg per tablet Take 1 tablet by mouth every 4 (four) hours as needed for Pain. (Patient not taking: Reported on 3/26/2025) 15 tablet 0    TRUE METRIX GLUCOSE TEST STRIP Strp USE 1 STRIP TO TEST BLOOD SUGAR 4 TIMES A DAY (Patient not taking: Reported on 3/26/2025) 200 strip 3    TRUEPLUS LANCETS 33 gauge Misc USE 4 TIMES A DAY (Patient not taking: Reported on 3/26/2025) 200 each 3     No current facility-administered medications for this visit.   [2]   Social History  Socioeconomic History    Marital status: Single   Tobacco Use    Smoking status: Every Day     Current packs/day: 1.00     Average packs/day: 1 pack/day for 22.2 years (22.2 ttl pk-yrs)     Types: Cigarettes     Start date: 2003    Smokeless tobacco: Never    Tobacco comments:     Smoking cessation education and handouts provided including the dangers of 2nd and 3rd hand smoke, [place] prohibiting smoking in a motor vehicle with a child under age 13 in the vehicle, as well as strategies used by [facility]essation [facility].    Substance and Sexual Activity    Alcohol use: Not Currently     Comment: occasionally    Drug use: Never    Sexual activity: Not Currently     Partners: Male     Comment: sometimes     Social Drivers of Health     Financial Resource Strain: Medium Risk (3/9/2025)    Overall Financial Resource Strain (CARDIA)     Difficulty of Paying Living Expenses: Somewhat hard   Food Insecurity: Food Insecurity Present (3/9/2025)    Hunger Vital Sign     Worried About Running Out of Food in the Last Year: Sometimes true     Ran Out of Food in the Last Year: Never true   Transportation Needs: No Transportation Needs (8/16/2024)    Received from [employer] [facility] and [facility] - Transportation     In the past 12 months, has lack of transportation kept you from  medical appointments or from getting medications?: No     In the past 12 months, has lack of transportation kept you from meetings, work, or from getting things needed for daily living?: No   Physical Activity: Insufficiently Active (12/24/2024)    Exercise Vital Sign     Days of Exercise per Week: 1 day     Minutes of Exercise per Session: 20 min   Stress: Stress Concern Present (3/9/2025)    [employer] [place] [facility] [employer] - Occupational Stress Questionnaire     Feeling of Stress : To some extent   Housing Stability: High Risk (3/9/2025)    Housing Stability Vital Sign     Unable to Pay for Housing in the Last Year: Yes     Homeless in the Last Year: No

## 2025-03-28 NOTE — PROGRESS NOTES
CC: follow up delivery    HPI:   Jose Hernandez is a 34 y.o. here for f/u  with BTL. She reports loose bowel movements still going on, ahs been going on 3 weeks. She reports it is either very watery but sometimes formed but very soft. Is 1-2 x/day. No blood. No fever. She has had headaches on and off the past few days. Goes away with tylenol. Developed some of her typical hydradenitis on mons but improved with topical clindamycin      Vitals:    25 1314   BP: 122/76         PHYSICAL EXAM:   APPEARANCE: Well nourished, well developed, in no acute distress.  ABDOMEN: Soft. No tenderness or masses. No hernias. well healed Pfannenstiel incision, edema in panus improved, 3 healing pustules on mons, separate from incision, only 1 is slightly open but no drainage   PELVIC: deferred       RLTCD/BTL  Type 2 DM   Diarrhea       PLAN:   1. Incision well healing, some hidradenitis on mons but resolving, continue clindamycin   2. Following with endocrinology for BS control   3. Can try immodium, continue to hydrate well, avoid high fat/greasy foods, try foods higher in soluble fiber. Will do stool study and send to GI.

## 2025-03-31 ENCOUNTER — PATIENT MESSAGE (OUTPATIENT)
Dept: OBSTETRICS AND GYNECOLOGY | Facility: CLINIC | Age: 35
End: 2025-03-31
Payer: MEDICAID

## 2025-04-01 RX ORDER — BUTALBITAL, ACETAMINOPHEN AND CAFFEINE 50; 325; 40 MG/1; MG/1; MG/1
1 TABLET ORAL EVERY 4 HOURS PRN
Qty: 5 TABLET | Refills: 0 | Status: SHIPPED | OUTPATIENT
Start: 2025-04-01 | End: 2025-04-01

## 2025-04-01 RX ORDER — BUTALBITAL, ACETAMINOPHEN AND CAFFEINE 50; 325; 40 MG/1; MG/1; MG/1
1 TABLET ORAL EVERY 4 HOURS PRN
Qty: 5 TABLET | Refills: 0 | Status: SHIPPED | OUTPATIENT
Start: 2025-04-01 | End: 2025-04-01 | Stop reason: SDUPTHER

## 2025-04-01 RX ORDER — BUTALBITAL, ACETAMINOPHEN AND CAFFEINE 50; 325; 40 MG/1; MG/1; MG/1
1 TABLET ORAL EVERY 4 HOURS PRN
Qty: 5 TABLET | Refills: 0 | Status: SHIPPED | OUTPATIENT
Start: 2025-04-01 | End: 2025-05-01

## 2025-04-23 ENCOUNTER — PATIENT MESSAGE (OUTPATIENT)
Dept: ENDOCRINOLOGY | Facility: CLINIC | Age: 35
End: 2025-04-23

## 2025-04-23 ENCOUNTER — OFFICE VISIT (OUTPATIENT)
Dept: ENDOCRINOLOGY | Facility: CLINIC | Age: 35
End: 2025-04-23
Payer: MEDICAID

## 2025-04-23 DIAGNOSIS — E66.813 CLASS 3 SEVERE OBESITY WITHOUT SERIOUS COMORBIDITY WITH BODY MASS INDEX (BMI) GREATER THAN OR EQUAL TO 70 IN ADULT, UNSPECIFIED OBESITY TYPE: ICD-10-CM

## 2025-04-23 DIAGNOSIS — E66.01 CLASS 3 SEVERE OBESITY WITHOUT SERIOUS COMORBIDITY WITH BODY MASS INDEX (BMI) GREATER THAN OR EQUAL TO 70 IN ADULT, UNSPECIFIED OBESITY TYPE: ICD-10-CM

## 2025-04-23 DIAGNOSIS — E11.9 TYPE 2 DIABETES MELLITUS WITHOUT COMPLICATION, WITHOUT LONG-TERM CURRENT USE OF INSULIN: Primary | ICD-10-CM

## 2025-04-23 NOTE — PATIENT INSTRUCTIONS
Check A1c in 3 months    In 3 months let's also check your cholesterol and urine protein - these are things that should be checked once per year for anyone with diabetes.    You also should have an eye exam and a diabetic foot exam once per year. Your PCP can do the foot exam, or I can refer you to podiatry if you would like. Also, if you need a referral for an eye doctor let me know.    Send me a message when you are done breastfeeding and we can  get  you scheduled for a follow up to discuss starting Mounjaro then.

## 2025-04-23 NOTE — PROGRESS NOTES
Endocrinology Return Visit   04/23/2025  The patient location is: work  Visit type: audiovisual    Face to Face time with patient: 18 minutes  25 minutes of total time spent on the encounter, which includes face to face time and non-face to face time preparing to see the patient (eg, review of tests), Obtaining and/or reviewing separately obtained history, Documenting clinical information in the electronic or other health record, Independently interpreting results (not separately reported) and communicating results to the patient/family/caregiver, or Care coordination (not separately reported).     Each patient to whom he or she provides medical services by telemedicine is:  (1) informed of the relationship between the physician and patient and the respective role of any other health care provider with respect to management of the patient; and (2) notified that he or she may decline to receive medical services by telemedicine and may withdraw from such care at any time.    Subjective:      Chief Complaint:  Diabetes      History of Present Illness  Jose Hernandez is a 34 y.o. female with tobacco use d/o, T2DM, obesity, hidradenitis suppurativa (was on Humira before pregnancy) who presents for follow up of T2DM in pregnancy.      T2DM  Diagnosed early in recent pregnancy with 1st trimester A1c 7.1%; pt reportedly unaware of dx prior to this. However A1c was also 7.1% in 9/2023.  preDM before, she denies DM or GDM in prior pregnancies   Known complications: gastroparesis    Pre-pregnancy she was on metformin only for her DM  I saw her during pregnancy, she delivered march 9th, she is currently breast and bottle feeding. Planning to breastfeed for 6mo, but TBD.  Pregnancy was c/b low lying placenta, hx C/S x2, tobacco use, short cervix, and obesity   .   Still wearing dexcom - been in range most of the time she states.  After delivery insulin doses cut in half and she used this for 1-2 weeks but then stopped  "all insulin and sugars have remained in range  Last gave Lantus 20u x1 on 4/12, but no insulin since then and did not use it for days before that either.    Current Diabetes Regimen:  None    Prior mediations tried:  Metformin - diarrhea   Insulin MDI - stopped after pregnancy      Diet/Exercise:  Working on diet  Biggest meal in the evening - prev was working overnight but been on day schedule and eating 3 meals/day more consistently at typical meal times.   Breakfast - 8-9am on work days, usually coffee + banana, occ a sausage biscuit  Snacks at work - crackers  Lunch - leftovers from dinner   Dinner - mostly at home home cooked meals     Recent Hgb A1C:  Lab Results   Component Value Date    HGBA1C 6.8 (H) 02/07/2025       Glucose Monitoring: Dexcom G7 report reviewed and uploaded under media.  TIR excellent -- nearly 100% not on any insulin or DM meds. She gave Lantus 20u one time in the last 2wks, otherwise no insulin given. No lows.        Hypoglycemic Episodes: No lows since stopping insulin. She does know how to correct a low however.    Screening / DM Complications:    Nephropathy:  ACEi/ARB: Not taking  No results found for: "MICALBCREAT"    Last Lipid Panel:  Statin: Not taking; she states elevated cholesterol pre-pregnancy. Will need to re-evaluate lipids postpartum.  No results found for: "LDLCALC"    Last foot exam Most Recent Foot Exam Date: Not Found  Last eye exam Most Recent Eye Exam Date: Not Found;  annually in the spring for contacts and glasses, no laser surgery or DR; saw them within 1yr    B12:  No results found for: "ULNFJPST84"      ROS:   As above    Objective:     LMP 06/26/2024 (Exact Date)   BP Readings from Last 3 Encounters:   03/28/25 122/76   03/26/25 (!) 132/94   03/18/25 122/75     Wt Readings from Last 1 Encounters:   03/28/25 1314 (!) 237.9 kg (524 lb 7.6 oz)     There is no height or weight on file to calculate BMI.    Physical Exam  HENT:      Head: Normocephalic.      " "Mouth/Throat:      Mouth: Mucous membranes are moist.   Pulmonary:      Effort: Pulmonary effort is normal.   Neurological:      Mental Status: She is alert and oriented to person, place, and time.   Psychiatric:         Mood and Affect: Mood normal.         Behavior: Behavior normal.       Lab Review:   Lab Results   Component Value Date    HGBA1C 6.8 (H) 02/07/2025     No results found for: "CHOL", "HDL", "LDLCALC", "TRIG", "CHOLHDL"  Lab Results   Component Value Date     (L) 03/09/2025    K 3.9 03/09/2025     03/09/2025    CO2 17 (L) 03/09/2025     (H) 03/09/2025    BUN 8 03/09/2025    CREATININE 0.5 03/09/2025    CALCIUM 9.0 03/09/2025    PROT 6.9 03/09/2025    ALBUMIN 2.1 (L) 03/09/2025    BILITOT 0.2 03/09/2025    ALKPHOS 133 03/09/2025    AST 10 03/09/2025    ALT 5 (L) 03/09/2025    ANIONGAP 9 03/09/2025    ESTGFRAFRICA >60 01/06/2020    EGFRNONAA >60 01/06/2020     No results found for: "[id number]"      All relevant labs and imaging reviewed.    Assessment and Plan     1. Type 2 diabetes mellitus without complication, without long-term current use of insulin  Assessment & Plan:  T2DM, on MDI during recent pregnancy but has been off all insulin for last few weeks with TIR 99% with GMI 6.3% for the last 2wks (in range outside of pregnancy , opposed to much tighter goals during pregnancy)  Has a few more dexcoms that I advised that she wear and notify me if BG consistently >180s-200s  Not taking metformin -- worsening diarrhea when she took it previously  We discussed Mounjaro after she is done breastfeeding   Will check A1c, uACR, Lipids, and CMP in 3mo  F/u with me to discuss mounjaro when she is no longer breastfeeding  I have advised annual eye exam and annual DM foot exam as well     Orders:  -     Hemoglobin A1C; Future; Expected date: 07/23/2025  -     Microalbumin/Creatinine Ratio, Urine; Future; Expected date: 07/23/2025  -     Lipid Panel; Future; Expected date: " 07/23/2025  -     Comprehensive Metabolic Panel; Future; Expected date: 07/23/2025    2. Class 3 severe obesity without serious comorbidity with body mass index (BMI) greater than or equal to 70 in adult, unspecified obesity type  Overview:  Obesity in pregnancy   BMI 42     Assessment & Plan:  Plan for Mounjaro when no longer breastfeeding         Can you please contact her to schedule:  Fasting labs and urine in 3mo -- A1c, lipid panel, CMP, and urine micro  RTC TBD - she will reach out to us when she is done breastfeeding to schedule a f/u    Tiki Kwon MD  [facility]    Visit today included increased complexity associated with the care of the problems addressed and managing the longitudinal care of the patient due to the serious and/or complex managed problems

## 2025-04-23 NOTE — ASSESSMENT & PLAN NOTE
T2DM, on MDI during recent pregnancy but has been off all insulin for last few weeks with TIR 99% with GMI 6.3% for the last 2wks (in range outside of pregnancy , opposed to much tighter goals during pregnancy)  Has a few more dexcoms that I advised that she wear and notify me if BG consistently >180s-200s  Not taking metformin -- worsening diarrhea when she took it previously  We discussed Mounjaro after she is done breastfeeding   Will check A1c, uACR, Lipids, and CMP in 3mo  F/u with me to discuss mounjaro when she is no longer breastfeeding  I have advised annual eye exam and annual DM foot exam as well

## 2025-04-23 NOTE — Clinical Note
Can you please contact her to schedule: Fasting labs and urine in 3mo -- A1c, lipid panel, CMP, and urine micro RTC TBD - she will reach out to us when she is done breastfeeding to schedule a f/u

## 2025-05-08 ENCOUNTER — TELEPHONE (OUTPATIENT)
Dept: GASTROENTEROLOGY | Facility: CLINIC | Age: 35
End: 2025-05-08

## 2025-05-08 ENCOUNTER — OFFICE VISIT (OUTPATIENT)
Dept: GASTROENTEROLOGY | Facility: CLINIC | Age: 35
End: 2025-05-08
Payer: MEDICAID

## 2025-05-08 VITALS
WEIGHT: 231.94 LBS | SYSTOLIC BLOOD PRESSURE: 126 MMHG | DIASTOLIC BLOOD PRESSURE: 86 MMHG | HEIGHT: 62 IN | HEART RATE: 89 BPM | BODY MASS INDEX: 42.68 KG/M2

## 2025-05-08 DIAGNOSIS — K31.84 DIABETIC GASTROPARESIS: Primary | ICD-10-CM

## 2025-05-08 DIAGNOSIS — R19.8 ALTERNATING CONSTIPATION AND DIARRHEA: ICD-10-CM

## 2025-05-08 DIAGNOSIS — E66.813 CLASS 3 SEVERE OBESITY DUE TO EXCESS CALORIES WITH SERIOUS COMORBIDITY AND BODY MASS INDEX (BMI) OF 40.0 TO 44.9 IN ADULT: ICD-10-CM

## 2025-05-08 DIAGNOSIS — E11.43 DIABETIC GASTROPARESIS: Primary | ICD-10-CM

## 2025-05-08 DIAGNOSIS — E66.01 CLASS 3 SEVERE OBESITY DUE TO EXCESS CALORIES WITH SERIOUS COMORBIDITY AND BODY MASS INDEX (BMI) OF 40.0 TO 44.9 IN ADULT: ICD-10-CM

## 2025-05-08 PROBLEM — Z30.09 UNWANTED FERTILITY: Status: RESOLVED | Noted: 2024-11-15 | Resolved: 2025-05-08

## 2025-05-08 PROCEDURE — 3079F DIAST BP 80-89 MM HG: CPT | Mod: CPTII,,, | Performed by: NURSE PRACTITIONER

## 2025-05-08 PROCEDURE — 3074F SYST BP LT 130 MM HG: CPT | Mod: CPTII,,, | Performed by: NURSE PRACTITIONER

## 2025-05-08 PROCEDURE — 3008F BODY MASS INDEX DOCD: CPT | Mod: CPTII,,, | Performed by: NURSE PRACTITIONER

## 2025-05-08 PROCEDURE — 99214 OFFICE O/P EST MOD 30 MIN: CPT | Mod: PBBFAC,PN | Performed by: NURSE PRACTITIONER

## 2025-05-08 PROCEDURE — 99214 OFFICE O/P EST MOD 30 MIN: CPT | Mod: S$PBB,,, | Performed by: NURSE PRACTITIONER

## 2025-05-08 PROCEDURE — 3044F HG A1C LEVEL LT 7.0%: CPT | Mod: CPTII,,, | Performed by: NURSE PRACTITIONER

## 2025-05-08 PROCEDURE — 99999 PR PBB SHADOW E&M-EST. PATIENT-LVL IV: CPT | Mod: PBBFAC,,, | Performed by: NURSE PRACTITIONER

## 2025-05-08 PROCEDURE — 1160F RVW MEDS BY RX/DR IN RCRD: CPT | Mod: CPTII,,, | Performed by: NURSE PRACTITIONER

## 2025-05-08 PROCEDURE — 1159F MED LIST DOCD IN RCRD: CPT | Mod: CPTII,,, | Performed by: NURSE PRACTITIONER

## 2025-05-08 NOTE — PROGRESS NOTES
Subjective:       Patient ID: Jose Hernandez is a 34 y.o. female.    Chief Complaint: Diarrhea    35 y/o female T2DM presents to clinic for follow up with c/o abdominal pain and diarrhea. Last seen in GI clinic in  for similar problems. GES completed was abnormal; at 4 hours the percentage of retention is 20 % (normal <10%). Did not follow up for EGD due to transportation issues. Today she reports intermittent upper abdominal pain, nausea, and bloating especially after eating. Feels full after eating small amounts. No unintentional weight loss. No heartburn and reflux. Has irregular bowel habits. Reports daily diarrhea first 5 weeks after delivery on 3/9/2025. Now she is having alternating formed stool and diarrhea with occasional constipation. No blood in stool. No FH IBD, stomach or colon cancer.         Past Medical History:   Diagnosis Date    Diabetes mellitus, type 2     Hidradenitis suppurativa        Past Surgical History:   Procedure Laterality Date     SECTION WITH TUBAL LIGATION N/A 3/9/2025    Procedure:  SECTION, WITH TUBAL LIGATION;  Surgeon: Elysia Molina MD;  Location: [facility] L&D;  Service: OB/GYN;  Laterality: N/A;    COLD KNIFE CONIZATION OF CERVIX N/A 2023    Procedure: CONE BIOPSY, CERVIX, USING COLD KNIFE;  Surgeon: Tammy Martinez MD;  Location: [facility] OR;  Service: OB/GYN;  Laterality: N/A;    INCISION AND DRAINAGE OF GENITAL LABIA Left 2023    Procedure: INCISION AND DRAINAGE, VULVAR ABSCESS;  Surgeon: Tammy Martinez MD;  Location: [facility] OR;  Service: OB/GYN;  Laterality: Left;       Family History   Problem Relation Name Age of Onset    Breast cancer Paternal Aunt      Colon cancer Neg Hx      Ovarian cancer Neg Hx         Social History     Socioeconomic History    Marital status: Single   Tobacco Use    Smoking status: Every Day     Current packs/day: 1.00     Average packs/day: 1 pack/day for 22.3 years (22.3 ttl pk-yrs)     Types: Cigarettes     Start date:  2003    Smokeless tobacco: Never    Tobacco comments:     Smoking cessation education and handouts provided including the dangers of 2nd and 3rd hand smoke, [place] prohibiting smoking in a motor vehicle with a child under age 13 in the vehicle, as well as strategies used by [facility] SmokingCessation clinic.    Substance and Sexual Activity    Alcohol use: Not Currently     Comment: occasionally    Drug use: Never    Sexual activity: Not Currently     Partners: Male     Comment: sometimes     Social Drivers of Health     Financial Resource Strain: Medium Risk (3/9/2025)    Overall Financial Resource Strain (CARDIA)     Difficulty of Paying Living Expenses: Somewhat hard   Food Insecurity: Food Insecurity Present (3/9/2025)    Hunger Vital Sign     Worried About Running Out of Food in the Last Year: Sometimes true     Ran Out of Food in the Last Year: Never true   Transportation Needs: No Transportation Needs (8/16/2024)    [facility]     In the past 12 months, has lack of transportation kept you from medical appointments or from getting medications?: No     In the past 12 months, has lack of transportation kept you from meetings, work, or from getting things needed for daily living?: No   Physical Activity: Insufficiently Active (12/24/2024)    Exercise Vital Sign     Days of Exercise per Week: 1 day     Minutes of Exercise per Session: 20 min   Stress: Stress Concern Present (3/9/2025)    [employer] - Occupational Stress Questionnaire     Feeling of Stress : To some extent   Housing Stability: High Risk (3/9/2025)    Housing Stability Vital Sign     Unable to Pay for Housing in the Last Year: Yes     Homeless in the Last Year: No       Review of Systems   Constitutional:  Negative for appetite change and unexpected weight change.   HENT:  Negative for trouble swallowing.    Respiratory:  Negative for shortness of  "breath.    Cardiovascular:  Negative for chest pain.   Gastrointestinal:  Positive for abdominal pain, constipation, diarrhea and nausea.   Genitourinary:  Negative for dysuria.   Hematological:  Negative for adenopathy.   Psychiatric/Behavioral:  Negative for dysphoric mood.          Objective:     Vitals:    05/08/25 0934   BP: 126/86   BP Location: Left arm   Patient Position: Sitting   Pulse: 89   Weight: 105.2 kg (231 lb 14.8 oz)   Height: 5' 2" (1.575 m)          Physical Exam  Constitutional:       General: She is not in acute distress.     Appearance: She is obese.   HENT:      Head: Normocephalic.   Eyes:      Conjunctiva/sclera: Conjunctivae normal.   Pulmonary:      Effort: Pulmonary effort is normal. No respiratory distress.   Musculoskeletal:      Cervical back: Normal range of motion.   Skin:     Coloration: Skin is not jaundiced or pale.   Neurological:      Mental Status: She is alert and oriented to person, place, and time.   Psychiatric:         Mood and Affect: Mood normal.         Behavior: Behavior normal.               Assessment:         ICD-10-CM ICD-9-CM   1. Diabetic gastroparesis  E11.43 250.60    K31.84 536.3   2. Alternating constipation and diarrhea  R19.8 787.99   3. Class 3 severe obesity due to excess calories with serious comorbidity and body mass index (BMI) of 40.0 to 44.9 in adult  E66.813 278.01    E66.01 V85.41    Z68.41        Plan:       Diabetic gastroparesis  -     Gastroparesis diet  -     GES in 2023 was abnormal. Consider GLP-1 for diabetes mngt if repeat GES normal.  -     NM Gastric Emptying; Future; Expected date: 05/08/2025  -     Case Request Endoscopy: EGD (ESOPHAGOGASTRODUODENOSCOPY)    Alternating constipation and diarrhea  -     Trial daily fiber supplement such as Metamucil or Citrucel powder or capsules  -     Clostridioides difficile EIA; Future; Expected date: 05/08/2025  -     Giardia / Cryptosporidum, EIA; Future; Expected date: 05/08/2025  -     " "Pancreatic elastase, fecal; Future; Expected date: 05/08/2025  -     WBC, Stool; Future; Expected date: 05/08/2025  -     Stool Exam-Ova,Cysts,Parasites; Future; Expected date: 05/08/2025  -     Stool culture; Future; Expected date: 05/08/2025  -     Fecal fat, qualitative; Future; Expected date: 05/08/2025  -     Calprotectin, Stool; Future; Expected date: 05/08/2025  -     Adenovirus Molecular Detection, PCR, Non-Blood Stool; Future; Expected date: 05/08/2025  -     Rotavirus antigen, stool; Future; Expected date: 05/08/2025    Class 3 severe obesity due to excess calories with serious comorbidity and body mass index (BMI) of 40.0 to 44.9 in adult         -    Weight loss advised. Dietary and exercise          counseling done.      Follow up if symptoms worsen or fail to improve.     Patient's Medications   New Prescriptions    No medications on file   Previous Medications    BLOOD SUGAR DIAGNOSTIC STRP    1 strip by Misc.(Non-Drug; Combo Route) route 4 (four) times daily.    BLOOD-GLUCOSE METER MISC    1 each by Misc.(Non-Drug; Combo Route) route once. for 1 dose    BLOOD-GLUCOSE SENSOR (DEXCOM G7 SENSOR) RICARDO    Change sensor once every 10 days.    IBUPROFEN (ADVIL,MOTRIN) 800 MG TABLET    Take 1 tablet (800 mg total) by mouth every 8 (eight) hours as needed for Pain.    INSULIN GLARGINE U-300 CONC (TOUJEO MAX SOLOSTAR) 300 UNIT/ML (3 ML) INSULIN PEN    Inject 24 units into the skin at twice daily, in the morning and at bedtime    LANCETS 33 GAUGE MISC    1 lancet  by Misc.(Non-Drug; Combo Route) route 4 (four) times daily.    METFORMIN (GLUCOPHAGE) 1000 MG TABLET    Take 1 tablet (1,000 mg total) by mouth 2 (two) times daily with meals.    PEN NEEDLE, DIABETIC 32 GAUGE X 5/32" NDLE    1 each by Misc.(Non-Drug; Combo Route) route once daily.    TRUE METRIX GLUCOSE TEST STRIP STRP    USE 1 STRIP TO TEST BLOOD SUGAR 4 TIMES A DAY    TRUEPLUS LANCETS 33 GAUGE MISC    USE 4 TIMES A DAY   Modified Medications    No " medications on file   Discontinued Medications    FERROUS SULFATE 325 (65 FE) MG EC TABLET    Take 1 tablet (325 mg total) by mouth every other day.    OXYCODONE-ACETAMINOPHEN (PERCOCET) 5-325 MG PER TABLET    Take 1 tablet by mouth every 4 (four) hours as needed for Pain.

## 2025-05-08 NOTE — TELEPHONE ENCOUNTER
Dear Ms. Mary     Attached are your instructions for your upcoming procedure. Please take some time to carefully review them and write down any questions you may have. A nurse will call you 1-2 weeks prior to your procedure to go over the instructions and answer any questions you may have.         EGD Instructions     Date of procedure: Monday, 5/19/2025     Arrival Time: We will call you 1 day prior to your procedure to provide you with an arrival time.     Location of Department:   [facility] [address] [place] [address]   1st Floor Same Day Surgery     The day before your procedure: Sunday, 5/18      You may have a light evening meal.   No solid food after 7:00 pm.   Continue drinking clear liquids.      The day of the procedure: Monday, 5/19     You may have water/clear liquids until 2 hours before your procedure or as directed by the scheduling nurse     Clear Liquids That Are OK to Drink:   Water  Sports drinks (Gatorade, Power-Aid)  Crystal Light, Lemonade, Limeaid  Coffee or tea (no cream or nondairy creamer)  Clear juices without pulp (apple, white grape)  Clear soda (sprite, coke, ginger ale)        What You CANNOT do:   Do not drink milk or any dairy products.  Do not drink alcohol.  No gum chewing or candy morning of procedure.           Comments:            Medications Instructions below:     If you take HEART, BLOOD PRESSURE, SEIZURE, PAIN, LUNG (including inhalers/nebulizers), ANTI-REJECTION (transplant patients), or PSYCHIATRIC medications, please take at your regular times with a sip of water or as directed by the scheduling nurse.     If you begin taking any blood thinning medications, injectable weight loss/diabetes medications (other than insulin), or Adipex(Phentermine) prior to your procedure please contact the endoscopy scheduling department listed below AS SOON AS POSSIBLE. If these medications are not held for the appropriate amount of  days prior to procedure, your procedure will be canceled.        If you have any health changes prior to your procedure, please contact the endoscopy department to report changes.     On occasion, unforeseen circumstances may cause a delay in your procedure start time. We respect your time and appreciate your patience during these circumstances.        Important contact information:     [facility] hospital Endoscopy Department - [phone].  Hours of operation are Monday-Friday 8:00-4:30pm.     If you have questions regarding the prep or you need to reschedule, please call [phone].     After hours questions requiring immediate assistance, contact [facility] nurse line at [phone] or [phone].      Questions about insurance or financial obligations call [phone] or [phone].        Comments:                              IMPORTANT INFORMATION TO KNOW BEFORE YOUR PROCEDURE     [facility]        An adult friend/ family member MUST come with you to drive you home. You can not drive, take a taxi, Uber, Lyft, bus, or any form of public transportation without being accompanied by a  responsible adult. The responsible adult CAN NOT be the  of the service.      person must be available to return to pick you up within 15 minutes of being notified of discharge.     Please bring a picture ID, insurance card, & copayment     LEAVE ALL VALUABLES AND JEWELRY AT HOME. [facility] WILL NOT HOLD OR BE RESPONSIBLE FOR ANY LOST VALUABLES, JEWELRY, OR PERSONAL BELONGINGS. YOUR PERSONAL BELONGINGS MUST BE HELD BY YOUR ACCOMPANYING FRIEND/FAMILY MEMBER.     Plan to be at the hospital for 2-4 hours.              Please follow the instructions below:  1. You will see an American flag flying in the front of the hospital from [address] as close as you can in that lot.  2. Behind the flag, you will see a covered [address], enter through those  automatic double doors.  3. Immediately when you enter, you should be greeted by a team member who can assist you.  4. If not, follow the hallway immediately in front of you to the right towards the cafeteria.  5. Once you reach the cafeteria, there are only two options. You can either enter the cafeteria or continue down the hallway to your left. You will want to continue down the hallway all the way down to your left until you absolutely cannot go anymore. You will then find yourself in our Same Day Surgery lobby where you will check in behind the glass window.        [facility] Map for Endoscopy Procedures:

## 2025-05-08 NOTE — PATIENT INSTRUCTIONS
Dear Ms. Mary    Attached are your instructions for your upcoming procedure. Please take some time to carefully review them and write down any questions you may have. A nurse will call you 1-2 weeks prior to your procedure to go over the instructions and answer any questions you may have.       EGD Instructions    Date of procedure: Monday, 5/19/2025    Arrival Time: We will call you 1 day prior to your procedure to provide you with an arrival time.    Location of Department:   [facility] [address] [place] [address]   1st Floor Same Day Surgery    The day before your procedure: Sunday, 5/18     You may have a light evening meal.   No solid food after 7:00 pm.   Continue drinking clear liquids.     The day of the procedure: Monday, 5/19    You may have water/clear liquids until 2 hours before your procedure or as directed by the scheduling nurse    Clear Liquids That Are OK to Drink:   Water  Sports drinks (Gatorade, Power-Aid)  Crystal Light, Lemonade, Limeaid  Coffee or tea (no cream or nondairy creamer)  Clear juices without pulp (apple, white grape)  Clear soda (sprite, coke, ginger ale)      What You CANNOT do:   Do not drink milk or any dairy products.  Do not drink alcohol.  No gum chewing or candy morning of procedure.        Comments:         Medications Instructions below:    If you take HEART, BLOOD PRESSURE, SEIZURE, PAIN, LUNG (including inhalers/nebulizers), ANTI-REJECTION (transplant patients), or PSYCHIATRIC medications, please take at your regular times with a sip of water or as directed by the scheduling nurse.    If you begin taking any blood thinning medications, injectable weight loss/diabetes medications (other than insulin), or Adipex(Phentermine) prior to your procedure please contact the endoscopy scheduling department listed below AS SOON AS POSSIBLE. If these medications are not held for the appropriate amount of days prior to procedure,  your procedure will be canceled.      If you have any health changes prior to your procedure, please contact the endoscopy department to report changes.    On occasion, unforeseen circumstances may cause a delay in your procedure start time. We respect your time and appreciate your patience during these circumstances.      Important contact information:    [facility] Endoscopy Department - [phone].  Hours of operation are Monday-Friday 8:00-4:30pm.    If you have questions regarding the prep or you need to reschedule, please call [phone].    After hours questions requiring immediate assistance, contact [facility] nurse line at [phone] or [phone].     Questions about insurance or financial obligations call [phone] or [phone].      Comments:                           IMPORTANT INFORMATION TO KNOW BEFORE YOUR PROCEDURE     [facility]      An adult friend/ family member MUST come with you to drive you home. You can not drive, take a taxi, Uber, Lyft, bus, or any form of public transportation without being accompanied by a  responsible adult. The responsible adult CAN NOT be the  of the service.     person must be available to return to pick you up within 15 minutes of being notified of discharge.    Please bring a picture ID, insurance card, & copayment    LEAVE ALL VALUABLES AND JEWELRY AT HOME. [facility] WILL NOT HOLD OR BE RESPONSIBLE FOR ANY LOST VALUABLES, JEWELRY, OR PERSONAL BELONGINGS. YOUR PERSONAL BELONGINGS MUST BE HELD BY YOUR ACCOMPANYING FRIEND/FAMILY MEMBER.    Plan to be at the hospital for 2-4 hours.           Please follow the instructions below:  1. You will see an American flag flying in the front of the hospital from [address] as close as you can in that lot.  2. Behind the flag, you will see a covered [address], enter through those automatic double doors.  3. Immediately when you  enter, you should be greeted by a team member who can assist you.  4. If not, follow the hallway immediately in front of you to the right towards the cafeteria.  5. Once you reach the cafeteria, there are only two options. You can either enter the cafeteria or continue down the hallway to your left. You will want to continue down the hallway all the way down to your left until you absolutely cannot go anymore. You will then find yourself in our Same Day Surgery lobby where you will check in behind the glass window.      [facility] Map for Endoscopy Procedures:

## 2025-05-13 ENCOUNTER — LAB VISIT (OUTPATIENT)
Dept: LAB | Facility: HOSPITAL | Age: 35
End: 2025-05-13
Attending: NURSE PRACTITIONER
Payer: MEDICAID

## 2025-05-13 DIAGNOSIS — R19.8 ALTERNATING CONSTIPATION AND DIARRHEA: ICD-10-CM

## 2025-05-13 LAB — WBC #/AREA STL HPF: NORMAL /[HPF]

## 2025-05-13 PROCEDURE — 87328 CRYPTOSPORIDIUM AG IA: CPT

## 2025-05-13 PROCEDURE — 87427 SHIGA-LIKE TOXIN AG IA: CPT | Mod: 59

## 2025-05-13 PROCEDURE — 87798 DETECT AGENT NOS DNA AMP: CPT

## 2025-05-13 PROCEDURE — 82705 FATS/LIPIDS FECES QUAL: CPT

## 2025-05-13 PROCEDURE — 82653 EL-1 FECAL QUANTITATIVE: CPT

## 2025-05-13 PROCEDURE — 87177 OVA AND PARASITES SMEARS: CPT

## 2025-05-13 PROCEDURE — 87046 STOOL CULTR AEROBIC BACT EA: CPT

## 2025-05-13 PROCEDURE — 89055 LEUKOCYTE ASSESSMENT FECAL: CPT

## 2025-05-13 PROCEDURE — 87425 ROTAVIRUS AG IA: CPT

## 2025-05-13 PROCEDURE — 87046 STOOL CULTR AEROBIC BACT EA: CPT | Mod: 91

## 2025-05-14 LAB
C COLI+JEJ+UPSA DNA STL QL NAA+NON-PROBE: NEGATIVE
CALPROTECTIN INTERP (OHS): NORMAL
CALPROTECTIN STOOL (OHS): 19.6 ΜG/G
E COLI SXT1 STL QL IA: NEGATIVE
E COLI SXT2 STL QL IA: NEGATIVE
ELASTASE, STOOL INTERPRETATION (OHS): NORMAL
PANCREATIC ELASTASE, FECAL (OHS): >800 ΜG/G
RV AG STL QL IA.RAPID: NEGATIVE

## 2025-05-15 LAB
CRYPTOSP AG SPEC QL: NEGATIVE
FAT STL QL: NORMAL
G LAMBLIA AG STL QL IA: NEGATIVE
NEUTRAL FAT STL QL: NORMAL

## 2025-05-16 LAB
BACTERIA STL CULT: NORMAL
HADV DNA SPEC QL NAA+PROBE: NEGATIVE
SPECIMEN SOURCE: NORMAL

## 2025-05-20 LAB — O+P STL MICRO: NORMAL

## 2025-05-21 ENCOUNTER — OFFICE VISIT (OUTPATIENT)
Dept: SURGERY | Facility: CLINIC | Age: 35
End: 2025-05-21
Payer: MEDICAID

## 2025-05-21 VITALS
WEIGHT: 235.13 LBS | DIASTOLIC BLOOD PRESSURE: 91 MMHG | BODY MASS INDEX: 43.27 KG/M2 | SYSTOLIC BLOOD PRESSURE: 127 MMHG | HEART RATE: 96 BPM | TEMPERATURE: 99 F | HEIGHT: 62 IN

## 2025-05-21 DIAGNOSIS — L05.91 PILONIDAL CYST: Primary | ICD-10-CM

## 2025-05-21 PROCEDURE — 99999 PR PBB SHADOW E&M-EST. PATIENT-LVL III: CPT | Mod: PBBFAC,,, | Performed by: STUDENT IN AN ORGANIZED HEALTH CARE EDUCATION/TRAINING PROGRAM

## 2025-05-21 PROCEDURE — 3008F BODY MASS INDEX DOCD: CPT | Mod: CPTII,,, | Performed by: STUDENT IN AN ORGANIZED HEALTH CARE EDUCATION/TRAINING PROGRAM

## 2025-05-21 PROCEDURE — 99213 OFFICE O/P EST LOW 20 MIN: CPT | Mod: PBBFAC,PN | Performed by: STUDENT IN AN ORGANIZED HEALTH CARE EDUCATION/TRAINING PROGRAM

## 2025-05-21 PROCEDURE — 1160F RVW MEDS BY RX/DR IN RCRD: CPT | Mod: CPTII,,, | Performed by: STUDENT IN AN ORGANIZED HEALTH CARE EDUCATION/TRAINING PROGRAM

## 2025-05-21 PROCEDURE — 1159F MED LIST DOCD IN RCRD: CPT | Mod: CPTII,,, | Performed by: STUDENT IN AN ORGANIZED HEALTH CARE EDUCATION/TRAINING PROGRAM

## 2025-05-21 PROCEDURE — 3044F HG A1C LEVEL LT 7.0%: CPT | Mod: CPTII,,, | Performed by: STUDENT IN AN ORGANIZED HEALTH CARE EDUCATION/TRAINING PROGRAM

## 2025-05-21 PROCEDURE — 3074F SYST BP LT 130 MM HG: CPT | Mod: CPTII,,, | Performed by: STUDENT IN AN ORGANIZED HEALTH CARE EDUCATION/TRAINING PROGRAM

## 2025-05-21 PROCEDURE — 3080F DIAST BP >= 90 MM HG: CPT | Mod: CPTII,,, | Performed by: STUDENT IN AN ORGANIZED HEALTH CARE EDUCATION/TRAINING PROGRAM

## 2025-05-21 PROCEDURE — 99213 OFFICE O/P EST LOW 20 MIN: CPT | Mod: S$PBB,,, | Performed by: STUDENT IN AN ORGANIZED HEALTH CARE EDUCATION/TRAINING PROGRAM

## 2025-05-21 NOTE — PROGRESS NOTES
Patient ID: Jose Hernandez is a 34 y.o. female.    Chief Complaint: No chief complaint on file.      HPI:  HPI  34F with cyst at gluteal cleft for a few months. Recently pregnant, underwent csection 2 weeks ago.  Feels ok now.   No pain or drainage at cyst for now.   Never had procedure on it.     2025  Doing well now  No pain, no drainage for past few weeks  Hx of HS.       Review of Systems   Constitutional:  Negative for fever.   HENT:  Negative for trouble swallowing.    Respiratory:  Negative for shortness of breath.    Cardiovascular:  Negative for chest pain.   Gastrointestinal:  Negative for abdominal pain, blood in stool, nausea and vomiting.   Genitourinary:  Negative for dysuria.   Musculoskeletal:  Negative for gait problem.   Skin:  Negative for rash and wound.   Allergic/Immunologic: Negative for immunocompromised state.   Neurological:  Negative for weakness.   Hematological:  Does not bruise/bleed easily.   Psychiatric/Behavioral:  Negative for agitation.        Current Medications[1]    Review of patient's allergies indicates:  No Known Allergies    Past Medical History:   Diagnosis Date    Diabetes mellitus, type 2     Hidradenitis suppurativa        Past Surgical History:   Procedure Laterality Date    APPENDECTOMY       SECTION WITH TUBAL LIGATION N/A 2025    Procedure:  SECTION, WITH TUBAL LIGATION;  Surgeon: Elysia Molina MD;  Location: [facility] L&D;  Service: OB/GYN;  Laterality: N/A;    COLD KNIFE CONIZATION OF CERVIX N/A 2023    Procedure: CONE BIOPSY, CERVIX, USING COLD KNIFE;  Surgeon: Tammy Martinez MD;  Location: [facility] OR;  Service: OB/GYN;  Laterality: N/A;    ESOPHAGOGASTRODUODENOSCOPY N/A 2025    Procedure: EGD (ESOPHAGOGASTRODUODENOSCOPY);  Surgeon: Jairo Demarco MD;  Location: [facility];  Service: Gastroenterology;  Laterality: N/A;    INCISION AND DRAINAGE OF GENITAL LABIA Left 2023    Procedure: INCISION AND DRAINAGE, VULVAR  ABSCESS;  Surgeon: Tammy Martinez MD;  Location: [facility];  Service: OB/GYN;  Laterality: Left;    TONSILLECTOMY         Social History[2]    Vitals:    05/21/25 1421   BP: (!) 127/91   Pulse: 96   Temp: 98.6 °F (37 °C)       Physical Exam  Constitutional:       General: She is not in acute distress.     Appearance: She is well-developed.   HENT:      Head: Normocephalic and atraumatic.   Eyes:      General: No scleral icterus.  Cardiovascular:      Rate and Rhythm: Normal rate.   Pulmonary:      Effort: Pulmonary effort is normal.      Breath sounds: No stridor.   Abdominal:      General: There is no distension.      Palpations: Abdomen is soft.      Tenderness: There is no abdominal tenderness.   Lymphadenopathy:      Cervical: No cervical adenopathy.   Skin:     General: Skin is warm.      Findings: No erythema.          Neurological:      Mental Status: She is alert and oriented to person, place, and time.   Psychiatric:         Behavior: Behavior normal.     Body mass index is 43 kg/m².      Assessment & Plan:  34F with pilonidal cyst  Unclear if related to pregnancy but never had before  Also with hx of HS but not having issues with that currently either. If she restarts humira may help?  Nothing to do right now  RTC if becomes more bothersome, recurrent and we can discuss excision further if needed                 [1]   Current Outpatient Medications   Medication Sig Dispense Refill    blood sugar diagnostic Strp 1 strip by Misc.(Non-Drug; Combo Route) route 4 (four) times daily. (Patient not taking: Reported on 5/21/2025) 200 each 3    blood-glucose meter Misc 1 each by Misc.(Non-Drug; Combo Route) route once. for 1 dose 1 each 0    blood-glucose sensor (DEXCOM G7 SENSOR) Krysten Change sensor once every 10 days. (Patient not taking: Reported on 5/21/2025) 3 each 11    ibuprofen (ADVIL,MOTRIN) 800 MG tablet Take 1 tablet (800 mg total) by mouth every 8 (eight) hours as needed for Pain. (Patient not taking:  "Reported on 5/21/2025) 30 tablet 2    insulin glargine U-300 conc (TOUJEO MAX SOLOSTAR) 300 unit/mL (3 mL) insulin pen Inject 24 units into the skin at twice daily, in the morning and at bedtime (Patient not taking: Reported on 5/21/2025)      lancets 33 gauge Misc 1 lancet  by Misc.(Non-Drug; Combo Route) route 4 (four) times daily. (Patient not taking: Reported on 5/21/2025) 200 each 3    metFORMIN (GLUCOPHAGE) 1000 MG tablet Take 1 tablet (1,000 mg total) by mouth 2 (two) times daily with meals. (Patient not taking: Reported on 5/21/2025) 180 tablet 3    pen needle, diabetic 32 gauge x 5/32" Ndle 1 each by Misc.(Non-Drug; Combo Route) route once daily. (Patient not taking: Reported on 5/21/2025) 100 each 6    TRUE METRIX GLUCOSE TEST STRIP Strp USE 1 STRIP TO TEST BLOOD SUGAR 4 TIMES A DAY (Patient not taking: Reported on 5/21/2025) 200 strip 3    TRUEPLUS LANCETS 33 gauge Misc USE 4 TIMES A DAY (Patient not taking: Reported on 5/21/2025) 200 each 3     No current facility-administered medications for this visit.   [2]   Social History  Socioeconomic History    Marital status: Single   Tobacco Use    Smoking status: Every Day     Current packs/day: 0.25     Average packs/day: 0.3 packs/day for 22.4 years (5.6 ttl pk-yrs)     Types: Cigarettes     Start date: 2003    Smokeless tobacco: Never    Tobacco comments:     Smoking cessation education and handouts provided including the dangers of 2nd and 3rd hand smoke, [place] [id number] prohibiting smoking in a motor vehicle with a child under age 13 in the vehicle, as well as strategies used by [facility] SmokingCessation clinic.    Substance and Sexual Activity    Alcohol use: Not Currently     Comment: occasionally    Drug use: Never    Sexual activity: Not Currently     Partners: Male     Comment: sometimes     Social Drivers of Health     Financial Resource Strain: Medium Risk (3/9/2025)    Overall Financial Resource Strain (CARDIA)     Difficulty of Paying Living Expenses: " Somewhat hard   Food Insecurity: Food Insecurity Present (3/9/2025)    Hunger Vital Sign     Worried About Running Out of Food in the Last Year: Sometimes true     Ran Out of Food in the Last Year: Never true   Transportation Needs: No Transportation Needs (8/16/2024)    [facility] from [facility] - Transportation     In the past 12 months, has lack of transportation kept you from medical appointments or from getting medications?: No     In the past 12 months, has lack of transportation kept you from meetings, work, or from getting things needed for daily living?: No   Physical Activity: Insufficiently Active (12/24/2024)    Exercise Vital Sign     Days of Exercise per Week: 1 day     Minutes of Exercise per Session: 20 min   Stress: Stress Concern Present (3/9/2025)    [employer] - Occupational Stress Questionnaire     Feeling of Stress : To some extent   Housing Stability: High Risk (3/9/2025)    Housing Stability Vital Sign     Unable to Pay for Housing in the Last Year: Yes     Homeless in the Last Year: No

## 2025-05-29 ENCOUNTER — PATIENT MESSAGE (OUTPATIENT)
Dept: ENDOCRINOLOGY | Facility: CLINIC | Age: 35
End: 2025-05-29
Payer: MEDICAID

## 2025-05-30 ENCOUNTER — RESULTS FOLLOW-UP (OUTPATIENT)
Dept: GASTROENTEROLOGY | Facility: CLINIC | Age: 35
End: 2025-05-30

## 2025-06-04 ENCOUNTER — TELEPHONE (OUTPATIENT)
Dept: GASTROENTEROLOGY | Facility: CLINIC | Age: 35
End: 2025-06-04
Payer: MEDICAID

## 2025-06-04 ENCOUNTER — PATIENT MESSAGE (OUTPATIENT)
Dept: ENDOCRINOLOGY | Facility: CLINIC | Age: 35
End: 2025-06-04

## 2025-06-04 ENCOUNTER — OFFICE VISIT (OUTPATIENT)
Dept: ENDOCRINOLOGY | Facility: CLINIC | Age: 35
End: 2025-06-04
Payer: MEDICAID

## 2025-06-04 DIAGNOSIS — E66.813 CLASS 3 SEVERE OBESITY DUE TO EXCESS CALORIES WITH SERIOUS COMORBIDITY AND BODY MASS INDEX (BMI) OF 40.0 TO 44.9 IN ADULT: ICD-10-CM

## 2025-06-04 DIAGNOSIS — E11.9 TYPE 2 DIABETES MELLITUS WITHOUT COMPLICATION, WITHOUT LONG-TERM CURRENT USE OF INSULIN: Primary | ICD-10-CM

## 2025-06-04 DIAGNOSIS — E66.01 CLASS 3 SEVERE OBESITY DUE TO EXCESS CALORIES WITH SERIOUS COMORBIDITY AND BODY MASS INDEX (BMI) OF 40.0 TO 44.9 IN ADULT: ICD-10-CM

## 2025-06-04 PROCEDURE — 98006 SYNCH AUDIO-VIDEO EST MOD 30: CPT | Mod: 95,,, | Performed by: STUDENT IN AN ORGANIZED HEALTH CARE EDUCATION/TRAINING PROGRAM

## 2025-06-04 PROCEDURE — 99212 OFFICE O/P EST SF 10 MIN: CPT | Performed by: STUDENT IN AN ORGANIZED HEALTH CARE EDUCATION/TRAINING PROGRAM

## 2025-06-04 PROCEDURE — G2211 COMPLEX E/M VISIT ADD ON: HCPCS | Mod: 95,,, | Performed by: STUDENT IN AN ORGANIZED HEALTH CARE EDUCATION/TRAINING PROGRAM

## 2025-06-04 PROCEDURE — 1159F MED LIST DOCD IN RCRD: CPT | Mod: CPTII,95,, | Performed by: STUDENT IN AN ORGANIZED HEALTH CARE EDUCATION/TRAINING PROGRAM

## 2025-06-04 PROCEDURE — 3044F HG A1C LEVEL LT 7.0%: CPT | Mod: CPTII,95,, | Performed by: STUDENT IN AN ORGANIZED HEALTH CARE EDUCATION/TRAINING PROGRAM

## 2025-06-04 PROCEDURE — 1160F RVW MEDS BY RX/DR IN RCRD: CPT | Mod: CPTII,95,, | Performed by: STUDENT IN AN ORGANIZED HEALTH CARE EDUCATION/TRAINING PROGRAM

## 2025-06-04 RX ORDER — TIRZEPATIDE 2.5 MG/.5ML
2.5 INJECTION, SOLUTION SUBCUTANEOUS
Qty: 2 ML | Refills: 5 | Status: SHIPPED | OUTPATIENT
Start: 2025-06-04

## 2025-06-09 ENCOUNTER — TELEPHONE (OUTPATIENT)
Dept: ENDOCRINOLOGY | Facility: CLINIC | Age: 35
End: 2025-06-09
Payer: MEDICAID

## 2025-07-30 ENCOUNTER — TELEPHONE (OUTPATIENT)
Dept: PODIATRY | Facility: CLINIC | Age: 35
End: 2025-07-30
Payer: MEDICAID

## 2025-08-21 ENCOUNTER — PATIENT MESSAGE (OUTPATIENT)
Dept: OBSTETRICS AND GYNECOLOGY | Facility: CLINIC | Age: 35
End: 2025-08-21
Payer: MEDICAID

## (undated) DEVICE — DRESSING TEGADERM 2 3/8 X 2.75

## (undated) DEVICE — SUT 2/0 30IN SILK BLK BRAI

## (undated) DEVICE — CATH URETHRAL 16FR RED

## (undated) DEVICE — SEE MEDLINE ITEM 154981

## (undated) DEVICE — Device

## (undated) DEVICE — GOWN POLY REINF BRTH SLV XL

## (undated) DEVICE — POUCH FLUID COLLECTN

## (undated) DEVICE — BLADE SURG CARBON STEEL SZ11

## (undated) DEVICE — SUT 0 27IN CHROMIC GUT CT-2

## (undated) DEVICE — CONTAINER SPECIMEN 4.5OZ STRL

## (undated) DEVICE — GOWN POLY REINF BRTH SLV LG

## (undated) DEVICE — PACK SURGERY START

## (undated) DEVICE — SWAB PROCTO RAYON TIP NS 16

## (undated) DEVICE — DRESSING TELFA N ADH 3X8

## (undated) DEVICE — CONTAINER MULTIPURPOSE/SPECIME

## (undated) DEVICE — GLOVE SURGICAL LATEX SZ 6.5

## (undated) DEVICE — PANTIES FEMININE NAPKIN LG/XLG

## (undated) DEVICE — SPONGE DERMA 8PLY 2X2

## (undated) DEVICE — COVER OVERHEAD SURG LT BLUE

## (undated) DEVICE — PAD PREP CUFFED NS 24X48IN